# Patient Record
Sex: FEMALE | Race: ASIAN | Employment: OTHER | URBAN - METROPOLITAN AREA
[De-identification: names, ages, dates, MRNs, and addresses within clinical notes are randomized per-mention and may not be internally consistent; named-entity substitution may affect disease eponyms.]

---

## 2017-01-03 ENCOUNTER — TELEPHONE (OUTPATIENT)
Dept: INTERNAL MEDICINE CLINIC | Facility: CLINIC | Age: 72
End: 2017-01-03

## 2017-01-06 ENCOUNTER — TELEPHONE (OUTPATIENT)
Dept: INTERNAL MEDICINE CLINIC | Facility: CLINIC | Age: 72
End: 2017-01-06

## 2017-01-06 NOTE — TELEPHONE ENCOUNTER
Pt called in asking if the repeat BMP is really necessary stating that she drank a lot of water prior to having labs drawn and that is what could have caused her to have low sodium levels.  Pt was advised that she is correct that drinking a large amount of

## 2017-01-07 ENCOUNTER — OFFICE VISIT (OUTPATIENT)
Dept: INTERNAL MEDICINE CLINIC | Facility: CLINIC | Age: 72
End: 2017-01-07

## 2017-01-07 VITALS
WEIGHT: 116 LBS | TEMPERATURE: 99 F | DIASTOLIC BLOOD PRESSURE: 84 MMHG | BODY MASS INDEX: 22.19 KG/M2 | HEART RATE: 74 BPM | HEIGHT: 60.5 IN | RESPIRATION RATE: 14 BRPM | OXYGEN SATURATION: 98 % | SYSTOLIC BLOOD PRESSURE: 122 MMHG

## 2017-01-07 DIAGNOSIS — K51.019 ULCERATIVE (CHRONIC) ENTEROCOLITIS, UNSPECIFIED COMPLICATION (HCC): Primary | Chronic | ICD-10-CM

## 2017-01-07 PROCEDURE — 99213 OFFICE O/P EST LOW 20 MIN: CPT | Performed by: INTERNAL MEDICINE

## 2017-01-07 NOTE — PROGRESS NOTES
Marino Cantor [facility]  is a 80year old female. Patient presents with:  Loose Stools   recent had recent treatment for H pylori by Dr. Shari Denson.   A few weeks later is worth noticing some abdominal discomfort with occasional mushy stools  HPI:       C REVIEW OF SYSTEMS:   Gastrointestinal:   Reflux no. Pain in stomach no. Loss of control of bowels no. recent travel no. recent antibiotics yes  any family member sick no. Appetite change no. Black stools no. Bloating no. Blood in stool no.  Chad color

## 2017-01-11 ENCOUNTER — TELEPHONE (OUTPATIENT)
Dept: INTERNAL MEDICINE CLINIC | Facility: CLINIC | Age: 72
End: 2017-01-11

## 2017-01-11 NOTE — TELEPHONE ENCOUNTER
Pt was provide with test results and stated that she is concerned because she is still having diarrhea, leakage, difficulty digesting her food, abdominal pain after eating and yesterday large amount of bright red blood after a BM.  Pt stated that when she w

## 2017-01-13 LAB — RESULT:: NOT DETECTED

## 2017-06-05 ENCOUNTER — HOSPITAL ENCOUNTER (OUTPATIENT)
Dept: BONE DENSITY | Age: 72
Discharge: HOME | End: 2017-06-05
Attending: INTERNAL MEDICINE
Payer: MEDICARE

## 2017-06-05 DIAGNOSIS — M81.0 OSTEOPOROSIS, UNSPECIFIED OSTEOPOROSIS TYPE, UNSPECIFIED PATHOLOGICAL FRACTURE PRESENCE: ICD-10-CM

## 2017-06-05 DIAGNOSIS — M81.0 SENILE OSTEOPOROSIS: ICD-10-CM

## 2017-06-05 DIAGNOSIS — Z78.0 POST-MENOPAUSAL: ICD-10-CM

## 2017-06-05 DIAGNOSIS — K91.850 POUCHITIS (HCC): ICD-10-CM

## 2017-06-05 PROCEDURE — 77080 DXA BONE DENSITY AXIAL: CPT | Performed by: INTERNAL MEDICINE

## 2017-06-07 ENCOUNTER — OFFICE VISIT (OUTPATIENT)
Dept: INTERNAL MEDICINE CLINIC | Facility: CLINIC | Age: 72
End: 2017-06-07

## 2017-06-07 ENCOUNTER — HOSPITAL ENCOUNTER (OUTPATIENT)
Dept: GENERAL RADIOLOGY | Age: 72
Discharge: HOME | End: 2017-06-07
Attending: PHYSICIAN ASSISTANT
Payer: MEDICARE

## 2017-06-07 VITALS
SYSTOLIC BLOOD PRESSURE: 108 MMHG | TEMPERATURE: 98 F | RESPIRATION RATE: 19 BRPM | DIASTOLIC BLOOD PRESSURE: 74 MMHG | HEART RATE: 76 BPM | WEIGHT: 117 LBS | OXYGEN SATURATION: 97 % | BODY MASS INDEX: 22.38 KG/M2 | HEIGHT: 60.5 IN

## 2017-06-07 DIAGNOSIS — M25.472 PAIN AND SWELLING OF LEFT ANKLE: ICD-10-CM

## 2017-06-07 DIAGNOSIS — M25.572 PAIN AND SWELLING OF LEFT ANKLE: ICD-10-CM

## 2017-06-07 DIAGNOSIS — M25.572 PAIN AND SWELLING OF LEFT ANKLE: Primary | ICD-10-CM

## 2017-06-07 DIAGNOSIS — M25.472 PAIN AND SWELLING OF LEFT ANKLE: Primary | ICD-10-CM

## 2017-06-07 PROCEDURE — 99213 OFFICE O/P EST LOW 20 MIN: CPT | Performed by: PHYSICIAN ASSISTANT

## 2017-06-07 PROCEDURE — 73610 X-RAY EXAM OF ANKLE: CPT | Performed by: PHYSICIAN ASSISTANT

## 2017-06-07 RX ORDER — PREDNISONE 20 MG/1
20 TABLET ORAL DAILY
Qty: 5 TABLET | Refills: 0 | Status: SHIPPED | OUTPATIENT
Start: 2017-06-07 | End: 2017-06-07

## 2017-06-07 RX ORDER — ATORVASTATIN CALCIUM 40 MG/1
40 TABLET, FILM COATED ORAL
Refills: 6 | COMMUNITY
Start: 2017-04-11 | End: 2017-06-07 | Stop reason: ALTCHOICE

## 2017-06-07 RX ORDER — ATENOLOL 50 MG/1
25 TABLET ORAL DAILY
Refills: 1 | COMMUNITY
Start: 2017-03-25

## 2017-06-07 NOTE — PROGRESS NOTES
Santo Clinton is a 80year old female. HPI:   Patient presents for L foot pain which began yesterday. Denies injury but notes she was doing a lot of walking yesterday and wearing heels the day before. Pain began when getting up out of a chair to walk. is soft & nontender. NEUROVASCULAR: Bilateral LE strength 5/5. +PF/DF. DP & PT pulses present bilaterally. ASSESSMENT AND PLAN:   # L ankle pain & swelling: XR to eval for distal fib fx. Use tylenol prn (avoid NSAIDs d/t colitis), ice, elevation.

## 2017-06-07 NOTE — PATIENT INSTRUCTIONS
Ankle Pain & Swelling:  - start prednisone 20 mg - take 1 tablet daily x 5 days  - ice (10-15 minutes at a time, 2-3 times a day)  - elevate your leg above the level of your heart when at rest  - trace the alphabet with your big toe 1-2 times a day to keep

## 2017-06-08 ENCOUNTER — TELEPHONE (OUTPATIENT)
Dept: INTERNAL MEDICINE CLINIC | Facility: CLINIC | Age: 72
End: 2017-06-08

## 2017-06-08 DIAGNOSIS — M25.472 PAIN AND SWELLING OF ANKLE, LEFT: Primary | ICD-10-CM

## 2017-06-08 DIAGNOSIS — M25.572 PAIN AND SWELLING OF ANKLE, LEFT: Primary | ICD-10-CM

## 2017-06-08 PROBLEM — M84.364A: Status: ACTIVE | Noted: 2017-06-08

## 2017-06-08 NOTE — TELEPHONE ENCOUNTER
Good morning,                Pt called to request for this ref to be changed from Dr. Uzma Cannon to Dr. Benita Estrella, per pt has apt today at 2:pm. Please advise if this ref can be changed.  Pt would like a call back once ref has been changed, call pt at 6

## 2017-06-15 ENCOUNTER — TELEPHONE (OUTPATIENT)
Dept: INTERNAL MEDICINE CLINIC | Facility: CLINIC | Age: 72
End: 2017-06-15

## 2017-06-15 NOTE — TELEPHONE ENCOUNTER
Received a call from Iesha with Dr Butler  office stating that pts DEXA scan was abnormal. Please advise.

## 2017-06-15 NOTE — TELEPHONE ENCOUNTER
Future Appointments  Date Time Provider Taras Marleni   6/19/2017 3:15 PM Hao Huddleston MD EMG 8 EMG [facility]   2/34/5753 9:85 AM Billie Cook PA-C [facility]

## 2017-06-19 ENCOUNTER — OFFICE VISIT (OUTPATIENT)
Dept: INTERNAL MEDICINE CLINIC | Facility: CLINIC | Age: 72
End: 2017-06-19

## 2017-06-19 VITALS
WEIGHT: 115 LBS | TEMPERATURE: 98 F | RESPIRATION RATE: 16 BRPM | OXYGEN SATURATION: 98 % | BODY MASS INDEX: 20.63 KG/M2 | HEART RATE: 76 BPM | HEIGHT: 62.5 IN | DIASTOLIC BLOOD PRESSURE: 70 MMHG | SYSTOLIC BLOOD PRESSURE: 114 MMHG

## 2017-06-19 DIAGNOSIS — E78.00 PURE HYPERCHOLESTEROLEMIA: Chronic | ICD-10-CM

## 2017-06-19 DIAGNOSIS — R73.9 HYPERGLYCEMIA: ICD-10-CM

## 2017-06-19 DIAGNOSIS — M81.0 SENILE OSTEOPOROSIS: Primary | Chronic | ICD-10-CM

## 2017-06-19 PROCEDURE — 99213 OFFICE O/P EST LOW 20 MIN: CPT | Performed by: INTERNAL MEDICINE

## 2017-06-19 NOTE — PROGRESS NOTES
Torrey Euceda   is a 80year old female. Patient presents with: Follow - Up      HPI:   Patient here for DEXA scan results    Current Outpatient Prescriptions:  atenolol 50 MG Oral Tab Take 50 mg by mouth daily.    Disp:  Rfl: HERMES Mantilla 505 of the fact that she has had a significant drop in her bone strength she was advised to see an endocrinologist.  She can opinion whether parental form of treatment would be the optimum route especially since she has had a history of ulcerative colitis and

## 2017-06-21 ENCOUNTER — TELEPHONE (OUTPATIENT)
Dept: INTERNAL MEDICINE CLINIC | Facility: CLINIC | Age: 72
End: 2017-06-21

## 2017-06-21 DIAGNOSIS — M81.0 SENILE OSTEOPOROSIS: Primary | Chronic | ICD-10-CM

## 2017-06-21 NOTE — TELEPHONE ENCOUNTER
I tried to enter referral for this patient but was unable to find Chino Pal with [facility] in [facility]. I contacted Abel Licona and she stated that she is going to contact [facility] to inquire how we can enter referral and get back with us.      Heriberto Hernandez

## 2017-06-21 NOTE — TELEPHONE ENCOUNTER
Referral entered and approved through pts insurance company in [address] [place] [address]. Pt notified.

## 2017-07-02 LAB
CHOL/HDLC RATIO: 2 (CALC)
CHOLESTEROL, TOTAL: 108 MG/DL (ref 125–200)
HDL CHOLESTEROL: 53 MG/DL
HEMOGLOBIN A1C: 6.2 % OF TOTAL HGB
LDL-CHOLESTEROL: 35 MG/DL (CALC)
NON-HDL CHOLESTEROL: 55 MG/DL (CALC)
TRIGLYCERIDES: 98 MG/DL

## 2017-07-05 DIAGNOSIS — R73.9 HYPERGLYCEMIA: ICD-10-CM

## 2017-07-05 DIAGNOSIS — E78.00 PURE HYPERCHOLESTEROLEMIA: Primary | Chronic | ICD-10-CM

## 2017-08-10 ENCOUNTER — TELEPHONE (OUTPATIENT)
Dept: INTERNAL MEDICINE CLINIC | Facility: CLINIC | Age: 72
End: 2017-08-10

## 2017-08-10 NOTE — TELEPHONE ENCOUNTER
SYLWIA Montgomery,     If this could please be documented on the pts chart, that way the referral dept can see the you guys did receive our message, have looked into it but are waiting clinicals just in case the pt c

## 2017-10-16 ENCOUNTER — TELEPHONE (OUTPATIENT)
Dept: INTERNAL MEDICINE CLINIC | Facility: CLINIC | Age: 72
End: 2017-10-16

## 2017-10-16 NOTE — TELEPHONE ENCOUNTER
Wanted to know if there are any vaccines we can proved here in the office for her upcoming travels to [place]. Informed that she can contact travel clinic. Understanding verbalized.

## 2017-10-23 ENCOUNTER — TELEPHONE (OUTPATIENT)
Dept: INTERNAL MEDICINE CLINIC | Facility: CLINIC | Age: 72
End: 2017-10-23

## 2017-10-23 DIAGNOSIS — Z11.59 NEED FOR HEPATITIS B SCREENING TEST: Primary | ICD-10-CM

## 2017-10-23 NOTE — TELEPHONE ENCOUNTER
Future Appointments  Date Time Provider Taras Marleni   10/24/2017 4:30 PM EMG 08 NURSE EMG 8 EM[facility] [place]   12/23/2017 11:00 AM Laurie Rios MD EMG 8 EM[facility] [place]

## 2017-10-24 ENCOUNTER — NURSE ONLY (OUTPATIENT)
Dept: INTERNAL MEDICINE CLINIC | Facility: CLINIC | Age: 72
End: 2017-10-24

## 2017-10-24 DIAGNOSIS — Z02.1 PRE-EMPLOYMENT HEALTH SCREENING EXAMINATION: Primary | ICD-10-CM

## 2017-10-24 PROCEDURE — G0009 ADMIN PNEUMOCOCCAL VACCINE: HCPCS | Performed by: INTERNAL MEDICINE

## 2017-10-24 PROCEDURE — 90670 PCV13 VACCINE IM: CPT | Performed by: INTERNAL MEDICINE

## 2017-11-20 ENCOUNTER — TELEPHONE (OUTPATIENT)
Dept: INTERNAL MEDICINE CLINIC | Facility: CLINIC | Age: 72
End: 2017-11-20

## 2017-11-20 DIAGNOSIS — Z02.1 ENCOUNTER FOR PRE-EMPLOYMENT HEALTH SCREENING EXAMINATION: Primary | ICD-10-CM

## 2017-11-20 NOTE — TELEPHONE ENCOUNTER
Pt called and wanted Quantiferon TB test done at [address] [place] [address] rather then [facility]. New order placed for correct resulting agency.

## 2017-11-24 ENCOUNTER — TELEPHONE (OUTPATIENT)
Dept: INTERNAL MEDICINE CLINIC | Facility: CLINIC | Age: 72
End: 2017-11-24

## 2017-12-30 ENCOUNTER — OFFICE VISIT (OUTPATIENT)
Dept: INTERNAL MEDICINE CLINIC | Facility: CLINIC | Age: 72
End: 2017-12-30

## 2017-12-30 VITALS
HEIGHT: 62.5 IN | HEART RATE: 69 BPM | RESPIRATION RATE: 14 BRPM | OXYGEN SATURATION: 99 % | WEIGHT: 115 LBS | BODY MASS INDEX: 20.63 KG/M2 | DIASTOLIC BLOOD PRESSURE: 78 MMHG | SYSTOLIC BLOOD PRESSURE: 128 MMHG | TEMPERATURE: 98 F

## 2017-12-30 DIAGNOSIS — E78.00 PURE HYPERCHOLESTEROLEMIA: Chronic | ICD-10-CM

## 2017-12-30 DIAGNOSIS — Z12.39 SCREENING FOR BREAST CANCER: ICD-10-CM

## 2017-12-30 DIAGNOSIS — Z00.00 ROUTINE GENERAL MEDICAL EXAMINATION AT A HEALTH CARE FACILITY: Primary | ICD-10-CM

## 2017-12-30 DIAGNOSIS — Z95.5 STENTED CORONARY ARTERY: ICD-10-CM

## 2017-12-30 PROCEDURE — G0439 PPPS, SUBSEQ VISIT: HCPCS | Performed by: INTERNAL MEDICINE

## 2017-12-30 RX ORDER — ATORVASTATIN CALCIUM 40 MG/1
20 TABLET, FILM COATED ORAL NIGHTLY
Refills: 6 | COMMUNITY
Start: 2017-11-26

## 2017-12-30 NOTE — PROGRESS NOTES
Jadyn Small [facility]  is a 71year old female. Patient presents with:  CPX: [employer] wellness visit      HPI:   See below    Current Outpatient Prescriptions:  atorvastatin 40 MG Oral Tab Take 40 mg by mouth.  At Bedtime Disp:  Rfl: 6   atenolol 50 redness, no drainage. Ears no earaches, no fullness, normal hearing, no tinnitus. Nose and Sinuses no recurrent colds, no discharge, no itching, no hay fever, no nosebleeds, no sinus trouble. Mouth and Pharynx no sore throats, no hoarseness.  Neck no lumps, GENERAL:   Build: normal .   General Appearance: alert and oriented, pleasant. HEENT:   Ear canals: normal.   EOM: within normal limit-conjunctiva normal.   Fundi: normal.   Head: normocephalic. Nasal septum: midline. Nose: unremarkable.    Oral cav Shelby Casanova was seen today for cpx.     Diagnoses and all orders for this visit:    Routine general medical examination at a health care facility  -     ASSAY, THYROID STIM HORMONE  -     T4(THYROXINE TOTAL)  -     HEMOGLOBIN A1C  -     COMP METABOLIC PANEL (14

## 2018-01-03 ENCOUNTER — TELEPHONE (OUTPATIENT)
Dept: INTERNAL MEDICINE CLINIC | Facility: CLINIC | Age: 73
End: 2018-01-03

## 2018-01-03 NOTE — TELEPHONE ENCOUNTER
Monet with [employer] called in looking for additional dx codes for T4, TSH and A1C.      Provided with E78.5 and R73.09.

## 2018-01-07 LAB
ABSOLUTE BASOPHILS: 59 CELLS/UL (ref 0–200)
ABSOLUTE EOSINOPHILS: 227 CELLS/UL (ref 15–500)
ABSOLUTE LYMPHOCYTES: 2171 CELLS/UL (ref 850–3900)
ABSOLUTE MONOCYTES: 378 CELLS/UL (ref 200–950)
ABSOLUTE NEUTROPHILS: 2565 CELLS/UL (ref 1500–7800)
ALBUMIN/GLOBULIN RATIO: 1.2 (CALC) (ref 1–2.5)
ALBUMIN: 3.8 G/DL (ref 3.6–5.1)
ALKALINE PHOSPHATASE: 83 U/L (ref 33–130)
ALT: 15 U/L (ref 6–29)
APPEARANCE: CLEAR
AST: 20 U/L (ref 10–35)
BASOPHILS: 1.1 %
BILIRUBIN, TOTAL: 0.9 MG/DL (ref 0.2–1.2)
BILIRUBIN: NEGATIVE
BUN: 10 MG/DL (ref 7–25)
CALCIUM: 9.1 MG/DL (ref 8.6–10.4)
CARBON DIOXIDE: 26 MMOL/L (ref 20–31)
CHLORIDE: 102 MMOL/L (ref 98–110)
COLOR: YELLOW
CREATININE: 0.84 MG/DL (ref 0.6–0.93)
EGFR IF AFRICN AM: 81 ML/MIN/1.73M2
EGFR IF NONAFRICN AM: 70 ML/MIN/1.73M2
EOSINOPHILS: 4.2 %
GLOBULIN: 3.3 G/DL (CALC) (ref 1.9–3.7)
GLUCOSE: 140 MG/DL (ref 65–99)
GLUCOSE: NEGATIVE
HEMATOCRIT: 38.6 % (ref 35–45)
HEMOGLOBIN A1C: 6.3 % OF TOTAL HGB
HEMOGLOBIN: 12 G/DL (ref 11.7–15.5)
KETONES: NEGATIVE
LYMPHOCYTES: 40.2 %
MCH: 27.3 PG (ref 27–33)
MCHC: 31.1 G/DL (ref 32–36)
MCV: 87.7 FL (ref 80–100)
MONOCYTES: 7 %
MPV: 9.9 FL (ref 7.5–12.5)
NEUTROPHILS: 47.5 %
NITRITE: NEGATIVE
OCCULT BLOOD: NEGATIVE
PH: 6 (ref 5–8)
PLATELET COUNT: 325 THOUSAND/UL (ref 140–400)
POTASSIUM: 4.7 MMOL/L (ref 3.5–5.3)
PROTEIN, TOTAL: 7.1 G/DL (ref 6.1–8.1)
PROTEIN: NEGATIVE
RDW: 12.9 % (ref 11–15)
RED BLOOD CELL COUNT: 4.4 MILLION/UL (ref 3.8–5.1)
SODIUM: 135 MMOL/L (ref 135–146)
SPECIFIC GRAVITY: 1.01 (ref 1–1.03)
T4 (THYROXINE), TOTAL: 8.2 MCG/DL (ref 4.5–12)
TSH: 1.69 MIU/L (ref 0.4–4.5)
WHITE BLOOD CELL COUNT: 5.4 THOUSAND/UL (ref 3.8–10.8)

## 2018-01-18 ENCOUNTER — HOSPITAL ENCOUNTER (OUTPATIENT)
Dept: MAMMOGRAPHY | Age: 73
Discharge: HOME | End: 2018-01-18
Attending: INTERNAL MEDICINE
Payer: MEDICARE

## 2018-01-18 DIAGNOSIS — Z12.39 SCREENING FOR BREAST CANCER: ICD-10-CM

## 2018-01-18 PROCEDURE — 77067 SCR MAMMO BI INCL CAD: CPT | Performed by: INTERNAL MEDICINE

## 2018-01-22 ENCOUNTER — TELEPHONE (OUTPATIENT)
Dept: INTERNAL MEDICINE CLINIC | Facility: CLINIC | Age: 73
End: 2018-01-22

## 2018-01-22 NOTE — TELEPHONE ENCOUNTER
Patient states she has bumps on her breast and just had a mammogram last Thursday and would like to get results. Patient worried because she hasn't gotten her results yet.  Please call patient, wants to speak to nurse

## 2018-01-22 NOTE — TELEPHONE ENCOUNTER
Advised that mammogram is normal. Understanding verbalized. Pt c/o painful red blisters to her R breast x 5 days. Denies drainage, burning and does not understanding the term tingling.     Advised the blisters may be shingles and to come into the office

## 2018-02-02 ENCOUNTER — HOSPITAL ENCOUNTER (OUTPATIENT)
Dept: CV DIAGNOSTICS | Facility: HOSPITAL | Age: 73
Discharge: HOME | End: 2018-02-02
Attending: SPECIALIST
Payer: MEDICARE

## 2018-02-02 DIAGNOSIS — I10 HTN (HYPERTENSION): ICD-10-CM

## 2018-02-02 DIAGNOSIS — Z98.61 H/O CORONARY ANGIOPLASTY: ICD-10-CM

## 2018-02-02 DIAGNOSIS — E78.49 OTHER HYPERLIPIDEMIA: ICD-10-CM

## 2018-02-02 DIAGNOSIS — I25.10 CAD (CORONARY ARTERY DISEASE): ICD-10-CM

## 2018-02-02 PROCEDURE — 78452 HT MUSCLE IMAGE SPECT MULT: CPT | Performed by: SPECIALIST

## 2018-02-02 PROCEDURE — 93306 TTE W/DOPPLER COMPLETE: CPT | Performed by: SPECIALIST

## 2018-02-02 PROCEDURE — 93018 CV STRESS TEST I&R ONLY: CPT | Performed by: SPECIALIST

## 2018-02-02 PROCEDURE — 93017 CV STRESS TEST TRACING ONLY: CPT | Performed by: SPECIALIST

## 2018-03-22 ENCOUNTER — TELEPHONE (OUTPATIENT)
Dept: INTERNAL MEDICINE CLINIC | Facility: CLINIC | Age: 73
End: 2018-03-22

## 2018-03-22 NOTE — TELEPHONE ENCOUNTER
Wendi with [employer] (pts employer) is requesting titers on mumps, rubella, varicella and hx of TDAP vaccine to be faxed to them.      I informed Wendi that since we do not have a consent from pt, I am unable to fax or provide any information to her d/t HIP

## 2018-03-22 NOTE — TELEPHONE ENCOUNTER
Pts  notified of below information and informed that we do not have records of titers so pt will need to have blood drawn. Understanding verbalized.

## 2018-03-22 NOTE — TELEPHONE ENCOUNTER
Wendi, calling from "[employer] an  service. Requesting to seek results for pt's past immunization results for: Mumps and Rubella. Wendi stated she received some info, however result was vacant.      Person to Contact Wendi:  T: [phone]

## 2018-05-15 ENCOUNTER — TELEPHONE (OUTPATIENT)
Dept: INTERNAL MEDICINE CLINIC | Facility: CLINIC | Age: 73
End: 2018-05-15

## 2018-05-15 DIAGNOSIS — Z02.1 PRE-EMPLOYMENT HEALTH SCREENING EXAMINATION: Primary | ICD-10-CM

## 2018-05-15 NOTE — TELEPHONE ENCOUNTER
Pt needs orders for titers for Varicella to return to work. [facility] wants to  orders in office to take to with her for draw.   Call pt to advise when orders are ready for pickup

## 2018-06-08 ENCOUNTER — TELEPHONE (OUTPATIENT)
Dept: INTERNAL MEDICINE CLINIC | Facility: CLINIC | Age: 73
End: 2018-06-08

## 2018-06-08 NOTE — TELEPHONE ENCOUNTER
Patient would like to speak with nurse regarding certain vaccinations she has had. Patient states she updated her vaccines but I informed she had the zoster and patient continues to repeat there should be more.  Patient was also informed that  Order for [facility]

## 2018-06-11 NOTE — TELEPHONE ENCOUNTER
Pt called to inquire about titers and not vaccines. Informed pt titer for varicella in the system and pt verbalized will complete tomorrow.

## 2018-09-14 ENCOUNTER — TELEPHONE (OUTPATIENT)
Dept: INTERNAL MEDICINE CLINIC | Facility: CLINIC | Age: 73
End: 2018-09-14

## 2018-09-14 NOTE — TELEPHONE ENCOUNTER
Pt would like a call back states that she had fainted and hurt her ribs doctors at the hospital told her to F/U with PCP in 3 to 4 weeks but is in pain would like to know if its normal or dose she need to come in

## 2018-09-14 NOTE — TELEPHONE ENCOUNTER
Pt states that she had to be rushed to the ambulance d/t syncope episode on 8/23. Pt stated that they performed CPR on pt. Asked pt if her heart stopped and she stated \"that is what they detected but all tests were normal\".  Pt complaining of her ribs sti

## 2018-09-17 ENCOUNTER — TELEPHONE (OUTPATIENT)
Dept: INTERNAL MEDICINE CLINIC | Facility: CLINIC | Age: 73
End: 2018-09-17

## 2018-09-17 NOTE — TELEPHONE ENCOUNTER
Sent medical records request to [facility] requesting most recent hospital records. Waiting on records to be faxed over.

## 2018-10-09 ENCOUNTER — TELEPHONE (OUTPATIENT)
Dept: INTERNAL MEDICINE CLINIC | Facility: CLINIC | Age: 73
End: 2018-10-09

## 2018-10-09 DIAGNOSIS — R07.81 RIB PAIN: Primary | ICD-10-CM

## 2018-10-09 NOTE — TELEPHONE ENCOUNTER
Spoke with pt and she stated that pain is bilateral.  X-ray bilateral ordered.    Pt given contact information for central scheduling

## 2018-10-09 NOTE — TELEPHONE ENCOUNTER
Patient looking for a xray for her ribs, was at [address] for care and saw doctors there but would like to know if Dr. Tatum Tracey can order a xray to see how shes healing.  Please call pt

## 2018-10-09 NOTE — TELEPHONE ENCOUNTER
Pt was in hospital on 8/23/18 d/t syncope episode. Pt stated that EMS had to do CPR because she was not breaking and her ribs are very sore still (see previous TE).  Notified pt she would need to come in for hfu since provider has not seen her since she was

## 2018-10-13 ENCOUNTER — HOSPITAL ENCOUNTER (OUTPATIENT)
Dept: GENERAL RADIOLOGY | Age: 73
Discharge: HOME | End: 2018-10-13
Attending: INTERNAL MEDICINE
Payer: MEDICARE

## 2018-10-13 DIAGNOSIS — R07.81 RIB PAIN: ICD-10-CM

## 2018-10-13 PROCEDURE — 71111 X-RAY EXAM RIBS/CHEST4/> VWS: CPT | Performed by: INTERNAL MEDICINE

## 2018-12-29 ENCOUNTER — OFFICE VISIT (OUTPATIENT)
Dept: INTERNAL MEDICINE CLINIC | Facility: CLINIC | Age: 73
End: 2018-12-29
Payer: MEDICARE

## 2018-12-29 VITALS
OXYGEN SATURATION: 100 % | HEART RATE: 66 BPM | TEMPERATURE: 98 F | RESPIRATION RATE: 16 BRPM | DIASTOLIC BLOOD PRESSURE: 70 MMHG | SYSTOLIC BLOOD PRESSURE: 144 MMHG | BODY MASS INDEX: 21.37 KG/M2 | WEIGHT: 113.19 LBS | HEIGHT: 61 IN

## 2018-12-29 DIAGNOSIS — R05.9 COUGH: ICD-10-CM

## 2018-12-29 DIAGNOSIS — L29.9 ITCHING: ICD-10-CM

## 2018-12-29 DIAGNOSIS — M81.0 SENILE OSTEOPOROSIS: ICD-10-CM

## 2018-12-29 DIAGNOSIS — Z00.00 ROUTINE GENERAL MEDICAL EXAMINATION AT A HEALTH CARE FACILITY: Primary | ICD-10-CM

## 2018-12-29 DIAGNOSIS — Z12.31 ENCOUNTER FOR SCREENING MAMMOGRAM FOR MALIGNANT NEOPLASM OF BREAST: ICD-10-CM

## 2018-12-29 DIAGNOSIS — K51.019 CHRONIC ULCERATIVE ENTEROCOLITIS WITH COMPLICATION (HCC): Chronic | ICD-10-CM

## 2018-12-29 PROCEDURE — 99213 OFFICE O/P EST LOW 20 MIN: CPT | Performed by: INTERNAL MEDICINE

## 2018-12-29 PROCEDURE — G0439 PPPS, SUBSEQ VISIT: HCPCS | Performed by: INTERNAL MEDICINE

## 2018-12-29 RX ORDER — CHOLECALCIFEROL (VITAMIN D3) 50 MCG
2000 TABLET ORAL DAILY
Qty: 30 TABLET | Refills: 0 | Status: SHIPPED | OUTPATIENT
Start: 2018-12-29 | End: 2019-12-29

## 2018-12-29 RX ORDER — PREDNISONE 1 MG/1
TABLET ORAL
Qty: 26 TABLET | Refills: 0 | Status: SHIPPED | OUTPATIENT
Start: 2018-12-29 | End: 2019-12-03 | Stop reason: ALTCHOICE

## 2018-12-29 RX ORDER — ALENDRONATE SODIUM 70 MG/1
70 TABLET ORAL WEEKLY
Qty: 13 TABLET | Refills: 3 | Status: SHIPPED | OUTPATIENT
Start: 2018-12-29 | End: 2019-03-29

## 2018-12-29 NOTE — PATIENT INSTRUCTIONS
Patient did check with Dr. Cornell De La O about Lialda and itching  Patient to see Dr. Janet Varela for her cardiac follow-up.   Will need to review her records from [facility]

## 2018-12-29 NOTE — PROGRESS NOTES
Santo Clinton   is a 68year old female.     Patient presents with:  Wellness Visit: [address]; Nonfasting  Chest Congestion  Itching  Skin: C/o feeling a pulsation sensation in her left hand      HPI:   See below  also has had recent cough with some General/Constitutional:   General able to do usual activities, good exercise tolerance, good general state of health, no fatigue, no fever, no weakness, no weight loss or gain . HEENT/Neck:   Head no headache, no dizziness, no lightheadedness.  Eyes n none. Trouble with balance none. Psychiatric:   Patient denies depression, hallucinations, memory loss. Anxiety none. Insomnia none. EXAM:   /70 (BP Location: Left arm, Patient Position: Sitting, Cuff Size: adult)   Pulse 66   Temp 98. oriented x 3. Motor: power-normal/tone -normal/co-ordination normal/wasting -none/involuntary movements -none. Reflexes: normal.   Sensory: normal sensation to all modalities. LYMPHATICS:   Groin: no adenopathy . Inguinal: no adenopathy.    Jeremy Render agrees to the plan. The patient is asked to Return in about 1 week (around 1/5/2019) for result discussion.   Leesa Ladd MD

## 2019-01-02 ENCOUNTER — TELEPHONE (OUTPATIENT)
Dept: INTERNAL MEDICINE CLINIC | Facility: CLINIC | Age: 74
End: 2019-01-02

## 2019-01-02 NOTE — TELEPHONE ENCOUNTER
Pt called complaining we coded her and her husbands labs incorrectly. Pt has not had her labs drawn yet. Pt instructed that the  can look at this next week and look at the coding.  Pt states she has notified our office and they will not p

## 2019-01-07 NOTE — TELEPHONE ENCOUNTER
Patient called to follow up on the issue.  Notified that  will overlook the issue once back in the office

## 2019-01-08 ENCOUNTER — TELEPHONE (OUTPATIENT)
Dept: INTERNAL MEDICINE CLINIC | Facility: CLINIC | Age: 74
End: 2019-01-08

## 2019-01-08 ENCOUNTER — LAB ENCOUNTER (OUTPATIENT)
Dept: LAB | Age: 74
End: 2019-01-08
Attending: INTERNAL MEDICINE
Payer: MEDICARE

## 2019-01-08 DIAGNOSIS — L29.9 ITCHING: ICD-10-CM

## 2019-01-08 LAB
ALBUMIN SERPL-MCNC: 3.3 G/DL (ref 3.1–4.5)
ALBUMIN/GLOB SERPL: 0.8 {RATIO} (ref 1–2)
ALP LIVER SERPL-CCNC: 89 U/L (ref 55–142)
ALT SERPL-CCNC: 22 U/L (ref 14–54)
ANION GAP SERPL CALC-SCNC: 6 MMOL/L (ref 0–18)
AST SERPL-CCNC: 26 U/L (ref 15–41)
BASOPHILS # BLD AUTO: 0.05 X10(3) UL (ref 0–0.1)
BASOPHILS NFR BLD AUTO: 0.8 %
BILIRUB SERPL-MCNC: 1.1 MG/DL (ref 0.1–2)
BILIRUB UR QL STRIP.AUTO: NEGATIVE
BUN BLD-MCNC: 12 MG/DL (ref 8–20)
BUN/CREAT SERPL: 11.3 (ref 10–20)
CALCIUM BLD-MCNC: 9.2 MG/DL (ref 8.3–10.3)
CHLORIDE SERPL-SCNC: 105 MMOL/L (ref 101–111)
CHOLEST SMN-MCNC: 106 MG/DL (ref ?–200)
CO2 SERPL-SCNC: 28 MMOL/L (ref 22–32)
COLOR UR AUTO: YELLOW
CREAT BLD-MCNC: 1.06 MG/DL (ref 0.55–1.02)
EOSINOPHIL # BLD AUTO: 0.28 X10(3) UL (ref 0–0.3)
EOSINOPHIL NFR BLD AUTO: 4.5 %
ERYTHROCYTE [DISTWIDTH] IN BLOOD BY AUTOMATED COUNT: 13.2 % (ref 11.5–16)
EST. AVERAGE GLUCOSE BLD GHB EST-MCNC: 169 MG/DL (ref 68–126)
GLOBULIN PLAS-MCNC: 4.4 G/DL (ref 2.8–4.4)
GLUCOSE BLD-MCNC: 147 MG/DL (ref 70–99)
GLUCOSE UR STRIP.AUTO-MCNC: NEGATIVE MG/DL
HBA1C MFR BLD HPLC: 7.5 % (ref ?–5.7)
HCT VFR BLD AUTO: 36.6 % (ref 34–50)
HDLC SERPL-MCNC: 56 MG/DL (ref 40–59)
HGB BLD-MCNC: 10.9 G/DL (ref 12–16)
HYALINE CASTS #/AREA URNS AUTO: PRESENT /LPF
IMMATURE GRANULOCYTE COUNT: 0.02 X10(3) UL (ref 0–1)
IMMATURE GRANULOCYTE RATIO %: 0.3 %
IMMUNOGLOBULIN E: >1150 IU/ML (ref 3.6–114)
KETONES UR STRIP.AUTO-MCNC: NEGATIVE MG/DL
LDLC SERPL CALC-MCNC: 30 MG/DL (ref ?–100)
LYMPHOCYTES # BLD AUTO: 1.92 X10(3) UL (ref 0.9–4)
LYMPHOCYTES NFR BLD AUTO: 30.8 %
M PROTEIN MFR SERPL ELPH: 7.7 G/DL (ref 6.4–8.2)
MCH RBC QN AUTO: 24.9 PG (ref 27–33.2)
MCHC RBC AUTO-ENTMCNC: 29.8 G/DL (ref 31–37)
MCV RBC AUTO: 83.6 FL (ref 81–100)
MONOCYTES # BLD AUTO: 0.44 X10(3) UL (ref 0.1–1)
MONOCYTES NFR BLD AUTO: 7.1 %
NEUTROPHIL ABS PRELIM: 3.52 X10 (3) UL (ref 1.3–6.7)
NEUTROPHILS # BLD AUTO: 3.52 X10(3) UL (ref 1.3–6.7)
NEUTROPHILS NFR BLD AUTO: 56.5 %
NITRITE UR QL STRIP.AUTO: NEGATIVE
NONHDLC SERPL-MCNC: 50 MG/DL (ref ?–130)
OSMOLALITY SERPL CALC.SUM OF ELEC: 290 MOSM/KG (ref 275–295)
PH UR STRIP.AUTO: 6 [PH] (ref 4.5–8)
PLATELET # BLD AUTO: 413 10(3)UL (ref 150–450)
POTASSIUM SERPL-SCNC: 4.5 MMOL/L (ref 3.6–5.1)
PROT UR STRIP.AUTO-MCNC: NEGATIVE MG/DL
RBC # BLD AUTO: 4.38 X10(6)UL (ref 3.8–5.1)
RBC UR QL AUTO: NEGATIVE
RED CELL DISTRIBUTION WIDTH-SD: 40.3 FL (ref 35.1–46.3)
SODIUM SERPL-SCNC: 139 MMOL/L (ref 136–144)
SP GR UR STRIP.AUTO: 1.01 (ref 1–1.03)
T4 SERPL-MCNC: 10.7 UG/DL (ref 4.5–10.9)
TRIGL SERPL-MCNC: 98 MG/DL (ref 30–149)
TSI SER-ACNC: 1.23 MIU/ML (ref 0.35–5.5)
UROBILINOGEN UR STRIP.AUTO-MCNC: <2 MG/DL
VIT D+METAB SERPL-MCNC: 40.9 NG/ML (ref 30–100)
VLDLC SERPL CALC-MCNC: 20 MG/DL (ref 0–30)
WBC # BLD AUTO: 6.2 X10(3) UL (ref 4–13)

## 2019-01-08 PROCEDURE — 80053 COMPREHEN METABOLIC PANEL: CPT | Performed by: INTERNAL MEDICINE

## 2019-01-08 PROCEDURE — 85025 COMPLETE CBC W/AUTO DIFF WBC: CPT | Performed by: INTERNAL MEDICINE

## 2019-01-08 PROCEDURE — 82306 VITAMIN D 25 HYDROXY: CPT | Performed by: INTERNAL MEDICINE

## 2019-01-08 PROCEDURE — 84443 ASSAY THYROID STIM HORMONE: CPT | Performed by: INTERNAL MEDICINE

## 2019-01-08 PROCEDURE — 82785 ASSAY OF IGE: CPT

## 2019-01-08 PROCEDURE — 84436 ASSAY OF TOTAL THYROXINE: CPT | Performed by: INTERNAL MEDICINE

## 2019-01-08 PROCEDURE — 81001 URINALYSIS AUTO W/SCOPE: CPT | Performed by: INTERNAL MEDICINE

## 2019-01-08 PROCEDURE — 83036 HEMOGLOBIN GLYCOSYLATED A1C: CPT | Performed by: INTERNAL MEDICINE

## 2019-01-08 PROCEDURE — 36415 COLL VENOUS BLD VENIPUNCTURE: CPT | Performed by: INTERNAL MEDICINE

## 2019-01-08 PROCEDURE — 80061 LIPID PANEL: CPT | Performed by: INTERNAL MEDICINE

## 2019-01-08 NOTE — TELEPHONE ENCOUNTER
Patient had labs done today [employer] and is asking about original orders sent to [address] [place] [address] 56 at Lab they would have reviewed at check in regarding her insurance/lab location.

## 2019-01-11 ENCOUNTER — TELEPHONE (OUTPATIENT)
Dept: INTERNAL MEDICINE CLINIC | Facility: CLINIC | Age: 74
End: 2019-01-11

## 2019-01-11 NOTE — TELEPHONE ENCOUNTER
Pt requested call back for test results. Pt also wanted know if necessary for chest xray now that she is feeling better and if she can get a flu on 1/19/19 when she comes to for her mammogram? I advised that no nurse appts were avail on 1/19/19.  Please dis

## 2019-01-11 NOTE — TELEPHONE ENCOUNTER
Advised pt that per our discussion earlier provider would discuss results at f/u visit. Pt verbalized understanding and scheduled OV.    Future Appointments   Date Time Provider Taras Marleni          1/26/2019 10:45 AM Julia Castro MD EMG 8 EMG [place]

## 2019-02-02 ENCOUNTER — OFFICE VISIT (OUTPATIENT)
Dept: INTERNAL MEDICINE CLINIC | Facility: CLINIC | Age: 74
End: 2019-02-02
Payer: MEDICARE

## 2019-02-02 ENCOUNTER — HOSPITAL ENCOUNTER (OUTPATIENT)
Dept: MAMMOGRAPHY | Age: 74
Discharge: HOME | End: 2019-02-02
Attending: INTERNAL MEDICINE
Payer: MEDICARE

## 2019-02-02 VITALS
BODY MASS INDEX: 18.16 KG/M2 | HEIGHT: 66 IN | RESPIRATION RATE: 14 BRPM | SYSTOLIC BLOOD PRESSURE: 116 MMHG | HEART RATE: 74 BPM | OXYGEN SATURATION: 94 % | TEMPERATURE: 98 F | WEIGHT: 113 LBS | DIASTOLIC BLOOD PRESSURE: 82 MMHG

## 2019-02-02 DIAGNOSIS — Z00.00 ROUTINE GENERAL MEDICAL EXAMINATION AT A HEALTH CARE FACILITY: ICD-10-CM

## 2019-02-02 DIAGNOSIS — E11.9 TYPE 2 DIABETES MELLITUS WITHOUT COMPLICATION, WITHOUT LONG-TERM CURRENT USE OF INSULIN (HCC): ICD-10-CM

## 2019-02-02 DIAGNOSIS — R82.90 ABNORMAL URINALYSIS: Primary | ICD-10-CM

## 2019-02-02 DIAGNOSIS — Z12.31 ENCOUNTER FOR SCREENING MAMMOGRAM FOR MALIGNANT NEOPLASM OF BREAST: ICD-10-CM

## 2019-02-02 PROCEDURE — 77063 BREAST TOMOSYNTHESIS BI: CPT | Performed by: INTERNAL MEDICINE

## 2019-02-02 PROCEDURE — 77067 SCR MAMMO BI INCL CAD: CPT | Performed by: INTERNAL MEDICINE

## 2019-02-02 PROCEDURE — 90732 PPSV23 VACC 2 YRS+ SUBQ/IM: CPT | Performed by: INTERNAL MEDICINE

## 2019-02-02 PROCEDURE — 99213 OFFICE O/P EST LOW 20 MIN: CPT | Performed by: INTERNAL MEDICINE

## 2019-02-02 PROCEDURE — G0009 ADMIN PNEUMOCOCCAL VACCINE: HCPCS | Performed by: INTERNAL MEDICINE

## 2019-02-02 NOTE — PROGRESS NOTES
Marino Cantor [facility]  is a 68year old female.     Patient presents with:  Lab Results: Review      HPI:   DIABETES MELLITUS:   The diet that the patient has been following is none  The frequency of the monitoring schedule is none    The results of th [facility])    • Ulcerative colitis ([address]     Dr. Rodney Suarez      Social History:  Social History    Tobacco Use      Smoking status: Never Smoker      Smokeless tobacco: Never Used    Alcohol use: No      Alcohol/week: 0.0 oz    Drug use: No       REVIEW OF SYSTEMS: herbal therapy      Spacing of meals -varying exercises discussed with patient   Reasoning of checking sugars pre and 2 hours  PP discussed with pt     Diabetic foot care information and brochure given to the patient  .   Blood in 4 months       The patient

## 2019-02-02 NOTE — PATIENT INSTRUCTIONS
Refuses to start any medicines at this moment.   Also declines to see the nutritionist.  Patient wants to try herbal therapy      Spacing of meals -varying exercises discussed with patient   Reasoning of checking sugars pre and 2 hours  PP discussed with pt

## 2019-02-04 ENCOUNTER — TELEPHONE (OUTPATIENT)
Dept: INTERNAL MEDICINE CLINIC | Facility: CLINIC | Age: 74
End: 2019-02-04

## 2019-02-04 DIAGNOSIS — R82.90 ABNORMAL URINALYSIS: Primary | ICD-10-CM

## 2019-02-06 NOTE — TELEPHONE ENCOUNTER
Giselle Castro ([phone]) from [address] [place] [address] called to advise that spec was never scanned by . She stated that  always scans in spec, so she believes sample was not sent to [address] [place] [address]. Please call if you have additional questions.

## 2019-02-06 NOTE — TELEPHONE ENCOUNTER
Called  And asked if she had dropped off her urine sample to [facility].  Pt states she has not. Will drop it off on 2/11 Monday and will call us to let us know she did. Called [facility] to let them know urine was not lost and Pt. Will drop it off next Monday.

## 2019-02-13 LAB
APPEARANCE: CLEAR
BILIRUBIN: NEGATIVE
COLOR: YELLOW
GLUCOSE: NEGATIVE
KETONES: NEGATIVE
NITRITE: NEGATIVE
OCCULT BLOOD: NEGATIVE
PH: 5.5 (ref 5–8)
PROTEIN: NEGATIVE
SPECIFIC GRAVITY: 1.01 (ref 1–1.03)

## 2019-02-22 ENCOUNTER — TELEPHONE (OUTPATIENT)
Dept: INTERNAL MEDICINE CLINIC | Facility: CLINIC | Age: 74
End: 2019-02-22

## 2019-02-25 NOTE — TELEPHONE ENCOUNTER
Pt notified of urine results. Pt stated she thought she was supposed to have blood work done. Per provider LOV note pt to have blood work in 4 months. Notified pt and she verbalized understanding. Pt also asking when she can have dexa scan done.  Advised

## 2019-04-04 NOTE — TELEPHONE ENCOUNTER
Urine sample that was sent to [facility] is missing. Pt will go to [facility] and drop off urine sample. patient and [facility] team

## 2019-07-09 ENCOUNTER — TELEPHONE (OUTPATIENT)
Dept: INTERNAL MEDICINE CLINIC | Facility: CLINIC | Age: 74
End: 2019-07-09

## 2019-07-09 DIAGNOSIS — Z78.0 POST-MENOPAUSAL: Primary | ICD-10-CM

## 2019-07-09 DIAGNOSIS — M81.0 SENILE OSTEOPOROSIS: ICD-10-CM

## 2019-07-09 NOTE — TELEPHONE ENCOUNTER
Pt requesting a bone density lab and also if she can have her labs repeated that she took for her CPX earlier this year

## 2019-07-09 NOTE — TELEPHONE ENCOUNTER
Called pt to clarify which labs needs repeated. Pt requested a CBC as pt stated blood hemoglobin count always low. Pt also requested a A1C, which is in the system already along w a microalb, pt informed.      Pt also informed has a hx of Colitis, and inq

## 2019-07-12 ENCOUNTER — APPOINTMENT (OUTPATIENT)
Dept: LAB | Age: 74
End: 2019-07-12
Attending: INTERNAL MEDICINE
Payer: MEDICARE

## 2019-07-12 ENCOUNTER — HOSPITAL ENCOUNTER (OUTPATIENT)
Dept: BONE DENSITY | Age: 74
Discharge: HOME | End: 2019-07-12
Attending: INTERNAL MEDICINE
Payer: MEDICARE

## 2019-07-12 DIAGNOSIS — Z78.0 POST-MENOPAUSAL: ICD-10-CM

## 2019-07-12 DIAGNOSIS — M81.0 SENILE OSTEOPOROSIS: Chronic | ICD-10-CM

## 2019-07-12 LAB
BASOPHILS # BLD AUTO: 0.07 X10(3) UL (ref 0–0.2)
BASOPHILS NFR BLD AUTO: 1.2 %
CREAT UR-SCNC: 98.4 MG/DL
DEPRECATED RDW RBC AUTO: 45 FL (ref 35.1–46.3)
EOSINOPHIL # BLD AUTO: 0.31 X10(3) UL (ref 0–0.7)
EOSINOPHIL NFR BLD AUTO: 5.4 %
ERYTHROCYTE [DISTWIDTH] IN BLOOD BY AUTOMATED COUNT: 13.6 % (ref 11–15)
EST. AVERAGE GLUCOSE BLD GHB EST-MCNC: 157 MG/DL (ref 68–126)
HBA1C MFR BLD HPLC: 7.1 % (ref ?–5.7)
HCT VFR BLD AUTO: 44.1 % (ref 35–48)
HGB BLD-MCNC: 13.4 G/DL (ref 12–16)
IMM GRANULOCYTES # BLD AUTO: 0.02 X10(3) UL (ref 0–1)
IMM GRANULOCYTES NFR BLD: 0.3 %
LYMPHOCYTES # BLD AUTO: 1.91 X10(3) UL (ref 1–4)
LYMPHOCYTES NFR BLD AUTO: 33.1 %
MCH RBC QN AUTO: 27.2 PG (ref 26–34)
MCHC RBC AUTO-ENTMCNC: 30.4 G/DL (ref 31–37)
MCV RBC AUTO: 89.6 FL (ref 80–100)
MICROALBUMIN UR-MCNC: 7.19 MG/DL
MICROALBUMIN/CREAT 24H UR-RTO: 73.1 UG/MG (ref ?–30)
MONOCYTES # BLD AUTO: 0.32 X10(3) UL (ref 0.1–1)
MONOCYTES NFR BLD AUTO: 5.5 %
NEUTROPHILS # BLD AUTO: 3.14 X10 (3) UL (ref 1.5–7.7)
NEUTROPHILS # BLD AUTO: 3.14 X10(3) UL (ref 1.5–7.7)
NEUTROPHILS NFR BLD AUTO: 54.5 %
PLATELET # BLD AUTO: 230 10(3)UL (ref 150–450)
RBC # BLD AUTO: 4.92 X10(6)UL (ref 3.8–5.3)
WBC # BLD AUTO: 5.8 X10(3) UL (ref 4–11)

## 2019-07-12 PROCEDURE — 83036 HEMOGLOBIN GLYCOSYLATED A1C: CPT | Performed by: INTERNAL MEDICINE

## 2019-07-12 PROCEDURE — 77080 DXA BONE DENSITY AXIAL: CPT | Performed by: INTERNAL MEDICINE

## 2019-07-12 PROCEDURE — 82570 ASSAY OF URINE CREATININE: CPT | Performed by: INTERNAL MEDICINE

## 2019-07-12 PROCEDURE — 82043 UR ALBUMIN QUANTITATIVE: CPT | Performed by: INTERNAL MEDICINE

## 2019-07-12 PROCEDURE — 36415 COLL VENOUS BLD VENIPUNCTURE: CPT | Performed by: INTERNAL MEDICINE

## 2019-07-12 PROCEDURE — 85025 COMPLETE CBC W/AUTO DIFF WBC: CPT | Performed by: INTERNAL MEDICINE

## 2019-07-19 ENCOUNTER — TELEPHONE (OUTPATIENT)
Dept: INTERNAL MEDICINE CLINIC | Facility: CLINIC | Age: 74
End: 2019-07-19

## 2019-07-19 NOTE — TELEPHONE ENCOUNTER
Spoke with Dr. Esthela Simmons as he was leaving office. He stated pt would need to come in to be seen before any medication is started. OV scheduled.   Future Appointments   Date Time Provider Taras Yepez   7/23/2019 10:30 AM DUONG Pandey EMG 8 E

## 2019-07-19 NOTE — TELEPHONE ENCOUNTER
Pt would like a call back from nurse states that Dr Rafael Suárez suggested he would let her know if she needed to be on BP medication on her next visit she states that she wants to get it before pcp go's on vacation next week

## 2019-07-19 NOTE — TELEPHONE ENCOUNTER
Pt is requesting medication for diabetes to be sent to pharmacy before provider leaves [facility]. Pt does have upcoming follow up but she is wanting to go ahead and start medication. Pt is insistent on having medication sent in.  Please advise

## 2019-07-23 NOTE — TELEPHONE ENCOUNTER
Patient cancelled today's appointment via phytel. Called and advised patient to reschedule, but patient declined.  States that she does not feel comfortable seeing another provider and she would rather wait for Dr. Archie Mahmood recommendation  Patient then pro

## 2019-07-23 NOTE — TELEPHONE ENCOUNTER
Advised pt that there are several different types of medications that treat DM and that is something that will need to be discussed at an office visit. Pt verbalized understanding.  Offered to reschedule appointment with Dr. Kiah Ponce but pt declined at this

## 2019-07-30 ENCOUNTER — TELEPHONE (OUTPATIENT)
Dept: INTERNAL MEDICINE CLINIC | Facility: CLINIC | Age: 74
End: 2019-07-30

## 2019-07-30 PROBLEM — R63.4 WEIGHT LOSS: Status: ACTIVE | Noted: 2019-07-30

## 2019-07-30 PROBLEM — K91.850 POUCHITIS (HCC): Status: ACTIVE | Noted: 2019-07-30

## 2019-07-30 NOTE — TELEPHONE ENCOUNTER
Per our office policy we do not have enough of the doses to start the series yet. Pt stated that per Dr. Tye Montes she needed to have shingrix vaccine prior to starting Entivyio. Nothing noted in Dr. Tye Montes note stating pt needed shingrix.  She stated to f/u with

## 2019-10-28 ENCOUNTER — TELEPHONE (OUTPATIENT)
Dept: INTERNAL MEDICINE CLINIC | Facility: CLINIC | Age: 74
End: 2019-10-28

## 2019-10-28 DIAGNOSIS — Z11.1 SCREENING-PULMONARY TB: ICD-10-CM

## 2019-10-28 DIAGNOSIS — Z01.84 IMMUNITY STATUS TESTING: Primary | ICD-10-CM

## 2019-10-28 NOTE — TELEPHONE ENCOUNTER
Patient called in requesting a two-step TB skin test and Chest x-ray per her job. Informed patient that there is already a Chest x-ray in system from 12/29/18.     Patient is requesting a call back once TB order is approved by Dr. Lane Ortiz so she can schedul

## 2019-10-29 NOTE — TELEPHONE ENCOUNTER
Discussed with pt as typically chest x-ray only needed if tb test positive. Pt stated her form has both on there. Pt scheduled chest x-ray using order in the system already.  Asked pt if she needed 2 step tb testing or just one tb skin test. Pt stated she t

## 2019-10-29 NOTE — TELEPHONE ENCOUNTER
Pt notified quantiferon TB testing ordered. Pt verbalized understanding.      Pt stating she needs a chest x-ray ordered as well d/t interpretative services at a hospital. Discussed with pt typically chest x-ray is not needed unless have a positive TB skin/

## 2019-11-02 ENCOUNTER — HOSPITAL ENCOUNTER (OUTPATIENT)
Dept: GENERAL RADIOLOGY | Age: 74
Discharge: HOME | End: 2019-11-02
Attending: INTERNAL MEDICINE
Payer: MEDICARE

## 2019-11-02 ENCOUNTER — TELEPHONE (OUTPATIENT)
Dept: INTERNAL MEDICINE CLINIC | Facility: CLINIC | Age: 74
End: 2019-11-02

## 2019-11-02 DIAGNOSIS — Z11.1 SCREENING-PULMONARY TB: ICD-10-CM

## 2019-11-02 PROCEDURE — 71046 X-RAY EXAM CHEST 2 VIEWS: CPT | Performed by: INTERNAL MEDICINE

## 2019-11-02 NOTE — TELEPHONE ENCOUNTER
Patient called stating that she had her flu shot about 6 weeks ago on L deltoid and still feels soreness. Patient has difficulty raising her arm. Requesting nurse recommendation. Please advise.

## 2019-11-04 NOTE — TELEPHONE ENCOUNTER
Pt c/o soreness in left arm. Pt is unsure if d/t flu vaccine or if she pulled something. Pt denies numbness or tingling. Pt denies trying any warm/cold compress or any OTC medications.  Offered to schedule OV however pt wanting to wait and try warm/cold com

## 2019-11-04 NOTE — TELEPHONE ENCOUNTER
Patient returned call from RN. Patient's phone disconnected before able to transfer. Please call back.

## 2019-11-04 NOTE — TELEPHONE ENCOUNTER
Attempted to call pt however phone rang several times and then went to busy tone. Also see test results.

## 2019-12-03 ENCOUNTER — OFFICE VISIT (OUTPATIENT)
Dept: INTERNAL MEDICINE CLINIC | Facility: CLINIC | Age: 74
End: 2019-12-03
Payer: MEDICARE

## 2019-12-03 VITALS
TEMPERATURE: 98 F | SYSTOLIC BLOOD PRESSURE: 122 MMHG | WEIGHT: 112 LBS | DIASTOLIC BLOOD PRESSURE: 62 MMHG | RESPIRATION RATE: 14 BRPM | BODY MASS INDEX: 20.87 KG/M2 | OXYGEN SATURATION: 99 % | HEART RATE: 98 BPM | HEIGHT: 61.25 IN

## 2019-12-03 DIAGNOSIS — Z95.5 STENTED CORONARY ARTERY: ICD-10-CM

## 2019-12-03 DIAGNOSIS — Z00.00 ROUTINE GENERAL MEDICAL EXAMINATION AT A HEALTH CARE FACILITY: Primary | ICD-10-CM

## 2019-12-03 DIAGNOSIS — E11.9 TYPE 2 DIABETES MELLITUS WITHOUT COMPLICATION, WITHOUT LONG-TERM CURRENT USE OF INSULIN (HCC): ICD-10-CM

## 2019-12-03 DIAGNOSIS — E78.00 PURE HYPERCHOLESTEROLEMIA: ICD-10-CM

## 2019-12-03 DIAGNOSIS — M81.0 SENILE OSTEOPOROSIS: ICD-10-CM

## 2019-12-03 DIAGNOSIS — Z79.899 OTHER LONG TERM (CURRENT) DRUG THERAPY: ICD-10-CM

## 2019-12-03 PROBLEM — R63.4 WEIGHT LOSS: Status: RESOLVED | Noted: 2019-07-30 | Resolved: 2019-12-03

## 2019-12-03 PROCEDURE — G0439 PPPS, SUBSEQ VISIT: HCPCS | Performed by: INTERNAL MEDICINE

## 2019-12-03 NOTE — PATIENT INSTRUCTIONS
See me After blood tests  10 Minutes spent listening to patient ranting about her insurance issues and nonpayment of bills

## 2019-12-03 NOTE — PROGRESS NOTES
Daniella Anne [facility]  is a 69year old female. Patient presents with:  Physical      HPI:   See below    Current Outpatient Medications   Medication Sig Dispense Refill   • metFORMIN HCl 500 MG Oral Tab Take 500 mg by mouth 2 (two) times daily.   5 HEENT/Neck:   Head no headache, no dizziness, no lightheadedness. Eyes normal vision, no redness, no drainage. Ears no earaches, no fullness, normal hearing, no tinnitus.  Nose and Sinuses no recurrent colds, no discharge, no itching, no hay fever, no nos (36.5 °C) (Oral)   Resp 14   Ht 61.25\"   Wt 112 lb (50.8 kg)   SpO2 99%   BMI 20.99 kg/m²   GENERAL:   Build: normal .   General Appearance: alert and oriented, pleasant.    HEENT:   Ear canals: normal.   EOM: within normal limit-conjunctiva normal.   Fund Supraclavicular: none.    DERMATOLOGY:   Rash: no.            ASSESSMENT AND PLAN:   Marlene Martinez was seen today for physical.    Diagnoses and all orders for this visit:    Routine general medical examination at a health care facility  [address]

## 2020-07-27 ENCOUNTER — TELEPHONE (OUTPATIENT)
Dept: INTERNAL MEDICINE CLINIC | Facility: CLINIC | Age: 75
End: 2020-07-27

## 2020-07-27 DIAGNOSIS — E11.9 TYPE 2 DIABETES MELLITUS WITHOUT COMPLICATION, WITHOUT LONG-TERM CURRENT USE OF INSULIN (HCC): Primary | Chronic | ICD-10-CM

## 2020-07-27 DIAGNOSIS — D64.9 LOW HEMOGLOBIN: ICD-10-CM

## 2020-07-27 NOTE — TELEPHONE ENCOUNTER
Requests orders for A1C recheck     RTC 6 weeks from 12/3/19 - declined appt at this time only wants orders for labs.   States will follow up in office at later time    Call patient to advise

## 2020-07-28 NOTE — TELEPHONE ENCOUNTER
Pt notified lab order placed and verbalized understanding. Pt also asking for a hemoglobin lab to be checked as she has a hx of lower hgb. Order pending. Pt aware she will only receive a call back if there are any issues.

## 2020-07-29 LAB
ABSOLUTE BASOPHILS: 50 CELLS/UL (ref 0–200)
ABSOLUTE EOSINOPHILS: 345 CELLS/UL (ref 15–500)
ABSOLUTE LYMPHOCYTES: 1685 CELLS/UL (ref 850–3900)
ABSOLUTE MONOCYTES: 355 CELLS/UL (ref 200–950)
ABSOLUTE NEUTROPHILS: 2565 CELLS/UL (ref 1500–7800)
BASOPHILS: 1 %
EOSINOPHILS: 6.9 %
HEMATOCRIT: 37.4 % (ref 35–45)
HEMOGLOBIN: 11.8 G/DL (ref 11.7–15.5)
LYMPHOCYTES: 33.7 %
MCH: 27.1 PG (ref 27–33)
MCHC: 31.6 G/DL (ref 32–36)
MCV: 86 FL (ref 80–100)
MONOCYTES: 7.1 %
MPV: 10.1 FL (ref 7.5–12.5)
NEUTROPHILS: 51.3 %
PLATELET COUNT: 299 THOUSAND/UL (ref 140–400)
RDW: 13.1 % (ref 11–15)
RED BLOOD CELL COUNT: 4.35 MILLION/UL (ref 3.8–5.1)
WHITE BLOOD CELL COUNT: 5 THOUSAND/UL (ref 3.8–10.8)

## 2020-07-30 LAB — HEMOGLOBIN A1C: 6.3 % OF TOTAL HGB

## 2020-09-22 ENCOUNTER — TELEPHONE (OUTPATIENT)
Dept: INTERNAL MEDICINE CLINIC | Facility: CLINIC | Age: 75
End: 2020-09-22

## 2020-09-22 NOTE — TELEPHONE ENCOUNTER
Patient coming in for Flu shot 9/30 and is requesting pneumonia shot as well.  Please verify patient is due as well as request order for this vaccine

## 2020-09-22 NOTE — TELEPHONE ENCOUNTER
Pt had prevnar 13 on 10/5/16 and 10/24/2017. Pt received pneumovax 23 on 2/2/2019. Please confirm pt not due for pneumovax 23 until 2/2/2024?

## 2020-09-30 ENCOUNTER — IMMUNIZATION (OUTPATIENT)
Dept: INTERNAL MEDICINE CLINIC | Facility: CLINIC | Age: 75
End: 2020-09-30
Payer: MEDICARE

## 2020-09-30 DIAGNOSIS — Z23 NEED FOR VACCINATION: ICD-10-CM

## 2020-09-30 PROCEDURE — 90662 IIV NO PRSV INCREASED AG IM: CPT | Performed by: INTERNAL MEDICINE

## 2020-09-30 PROCEDURE — G0008 ADMIN INFLUENZA VIRUS VAC: HCPCS | Performed by: INTERNAL MEDICINE

## 2020-10-29 ENCOUNTER — TELEPHONE (OUTPATIENT)
Dept: INTERNAL MEDICINE CLINIC | Facility: CLINIC | Age: 75
End: 2020-10-29

## 2020-10-29 DIAGNOSIS — Z11.1 SCREENING-PULMONARY TB: Primary | ICD-10-CM

## 2020-10-29 NOTE — TELEPHONE ENCOUNTER
Pt states that she is in need of quantiferon tb for employer ASAP. Orders pending if appropriate. TY!

## 2020-10-29 NOTE — TELEPHONE ENCOUNTER
Pt stated her Job informed her just yesterday that she needs to do a tb test and needs to give them the proof by no later then 11/01/20, pt can not work if she doesn't do the tb test, pt is a  and also a volunteer at [employer]

## 2020-11-03 ENCOUNTER — LAB ENCOUNTER (OUTPATIENT)
Dept: LAB | Age: 75
End: 2020-11-03
Attending: PHYSICIAN ASSISTANT
Payer: MEDICARE

## 2020-11-03 DIAGNOSIS — Z11.1 SCREENING EXAMINATION FOR PULMONARY TUBERCULOSIS: Primary | ICD-10-CM

## 2020-11-03 PROCEDURE — 86480 TB TEST CELL IMMUN MEASURE: CPT

## 2020-11-03 PROCEDURE — 36415 COLL VENOUS BLD VENIPUNCTURE: CPT

## 2020-11-03 NOTE — TELEPHONE ENCOUNTER
Patient would like to schedule TB test ASAP in the area today can we add to Nurse Visit schedule?  Please let her know either way

## 2020-11-06 NOTE — TELEPHONE ENCOUNTER
Patient called stating that she needs results by Monday for her employer. Notified patient that results are still in process. Advised her to follow up on Monday to see if results have been finalized.

## 2020-12-12 ENCOUNTER — OFFICE VISIT (OUTPATIENT)
Dept: INTERNAL MEDICINE CLINIC | Facility: CLINIC | Age: 75
End: 2020-12-12
Payer: MEDICARE

## 2020-12-12 ENCOUNTER — HOSPITAL ENCOUNTER (OUTPATIENT)
Dept: GENERAL RADIOLOGY | Age: 75
Discharge: HOME | End: 2020-12-12
Attending: INTERNAL MEDICINE
Payer: MEDICARE

## 2020-12-12 VITALS
DIASTOLIC BLOOD PRESSURE: 72 MMHG | BODY MASS INDEX: 19.79 KG/M2 | TEMPERATURE: 98 F | OXYGEN SATURATION: 99 % | RESPIRATION RATE: 16 BRPM | SYSTOLIC BLOOD PRESSURE: 150 MMHG | HEIGHT: 61 IN | HEART RATE: 72 BPM | WEIGHT: 104.81 LBS

## 2020-12-12 DIAGNOSIS — R63.4 WEIGHT LOSS: ICD-10-CM

## 2020-12-12 DIAGNOSIS — K51.019 CHRONIC ULCERATIVE ENTEROCOLITIS WITH COMPLICATION (HCC): Chronic | ICD-10-CM

## 2020-12-12 DIAGNOSIS — E11.9 TYPE 2 DIABETES MELLITUS WITHOUT COMPLICATION, WITHOUT LONG-TERM CURRENT USE OF INSULIN (HCC): Chronic | ICD-10-CM

## 2020-12-12 DIAGNOSIS — E78.00 PURE HYPERCHOLESTEROLEMIA: Chronic | ICD-10-CM

## 2020-12-12 DIAGNOSIS — Z12.31 ENCOUNTER FOR SCREENING MAMMOGRAM FOR MALIGNANT NEOPLASM OF BREAST: ICD-10-CM

## 2020-12-12 DIAGNOSIS — I25.10 ATHEROSCLEROSIS OF NATIVE CORONARY ARTERY OF NATIVE HEART, ANGINA PRESENCE UNSPECIFIED: Chronic | ICD-10-CM

## 2020-12-12 DIAGNOSIS — Z00.00 ROUTINE PHYSICAL EXAMINATION: Primary | ICD-10-CM

## 2020-12-12 DIAGNOSIS — L29.9 PRURITUS: ICD-10-CM

## 2020-12-12 PROCEDURE — G0439 PPPS, SUBSEQ VISIT: HCPCS | Performed by: INTERNAL MEDICINE

## 2020-12-12 PROCEDURE — 99213 OFFICE O/P EST LOW 20 MIN: CPT | Performed by: INTERNAL MEDICINE

## 2020-12-12 PROCEDURE — 71046 X-RAY EXAM CHEST 2 VIEWS: CPT | Performed by: INTERNAL MEDICINE

## 2020-12-12 PROCEDURE — 93000 ELECTROCARDIOGRAM COMPLETE: CPT | Performed by: INTERNAL MEDICINE

## 2020-12-12 RX ORDER — CHLORAL HYDRATE 500 MG
1000 CAPSULE ORAL DAILY
COMMUNITY

## 2020-12-12 RX ORDER — FERROUS SULFATE 325(65) MG
65 TABLET ORAL
COMMUNITY
End: 2021-03-04 | Stop reason: CLARIF

## 2020-12-12 NOTE — PROGRESS NOTES
Hannah Mejia [facility]  is a 77year old female.     Patient presents with:  Wellness Visit: AWV       HPI:   See below    Current Outpatient Medications   Medication Sig Dispense Refill   • Ferrous Sulfate 325 (65 Fe) MG Oral Tab Take 65 mg by mouth da exercise tolerance, good general state of health, no fatigue, no fever, no weakness, no weight loss or gain . HEENT/Neck:   Head no headache, no dizziness, no lightheadedness. Eyes normal vision, no redness, no drainage.  Ears no earaches, no fullness, no Tingling/numbness none. Trouble with balance none. Psychiatric:   Patient denies depression, hallucinations, memory loss. Anxiety none. Insomnia none.               EXAM:   /72 (BP Location: Left arm, Patient Position: Sitting, Cuff Size: adult)   P Gait: normal.   Mental Status: Alert & oriented x 3. Motor: power-normal/tone -normal/co-ordination normal/wasting -none/involuntary movements -none. Reflexes: normal.   Sensory: normal sensation to all modalities.    LYMPHATICS:   Groin: no adenopath

## 2020-12-12 NOTE — PATIENT INSTRUCTIONS
Blood pressure check and weight loss recommendations next visit after preliminary labs are available

## 2020-12-26 ENCOUNTER — APPOINTMENT (OUTPATIENT)
Dept: GENERAL RADIOLOGY | Age: 75
End: 2020-12-26
Attending: EMERGENCY MEDICINE
Payer: MEDICARE

## 2020-12-26 ENCOUNTER — HOSPITAL ENCOUNTER (OUTPATIENT)
Age: 75
Discharge: HOME | End: 2020-12-26
Attending: EMERGENCY MEDICINE
Payer: MEDICARE

## 2020-12-26 VITALS
SYSTOLIC BLOOD PRESSURE: 138 MMHG | TEMPERATURE: 98 F | RESPIRATION RATE: 18 BRPM | HEART RATE: 88 BPM | DIASTOLIC BLOOD PRESSURE: 50 MMHG | OXYGEN SATURATION: 100 %

## 2020-12-26 DIAGNOSIS — S50.01XA CONTUSION OF RIGHT ELBOW, INITIAL ENCOUNTER: Primary | ICD-10-CM

## 2020-12-26 DIAGNOSIS — S70.01XA CONTUSION OF RIGHT HIP, INITIAL ENCOUNTER: ICD-10-CM

## 2020-12-26 PROCEDURE — 99214 OFFICE O/P EST MOD 30 MIN: CPT

## 2020-12-26 PROCEDURE — 99203 OFFICE O/P NEW LOW 30 MIN: CPT

## 2020-12-26 PROCEDURE — 73503 X-RAY EXAM HIP UNI 4/> VIEWS: CPT | Performed by: EMERGENCY MEDICINE

## 2020-12-26 PROCEDURE — 73080 X-RAY EXAM OF ELBOW: CPT | Performed by: EMERGENCY MEDICINE

## 2020-12-26 NOTE — ED PROVIDER NOTES
Patient Seen in: [facility]      History   Patient presents with:  Fall    Stated Complaint: fell x 2 days hurt right side hip and elbow    HPI    23-year-old female comes to the hospital complaint of having pain in the area of her right hip and elbow  Other systems are as noted in HPI. Constitutional and vital signs reviewed. All other systems reviewed and negative except as noted above.     Physical Exam     ED Triage Vitals [12/26/20 1110]   /50   Pulse 88   Resp 18   Temp 97 12/12/2020 at 2:40 PM       Xr Elbow, Complete (min 3 Views), Right (cpt=73080)    Result Date: 12/26/2020  PROCEDURE:  XR ELBOW, COMPLETE (MIN 3 VIEWS), RIGHT (CPT=73080)  TECHNIQUE:  Three views were obtained. COMPARISON:  None.   INDICATIONS:  fell x 2 outpatient management for her elbow contusion and hip contusion and follow-up.   She is given orthopedics referral as well                         Disposition and Plan     Clinical Impression:  Contusion of right elbow, initial encounter  (primary encounter

## 2020-12-26 NOTE — ED INITIAL ASSESSMENT (HPI)
Patient states had a fall 2 days ago- injured her right elbow and hip  Denies any head injury or blood thinners

## 2020-12-28 ENCOUNTER — TELEPHONE (OUTPATIENT)
Dept: INTERNAL MEDICINE CLINIC | Facility: CLINIC | Age: 75
End: 2020-12-28

## 2020-12-28 DIAGNOSIS — M25.529 ELBOW PAIN, UNSPECIFIED LATERALITY: ICD-10-CM

## 2020-12-28 DIAGNOSIS — M25.559 HIP PAIN: Primary | ICD-10-CM

## 2020-12-28 NOTE — TELEPHONE ENCOUNTER
I spoke with pt whom states that she is still having pain to R hip and R elbow d/t fall. I spoke with  regarding pts s/s, along with xray results and IC notes. Per VO from , pt is to f/u with ortho.      Referral entered for ortho and pt notifie

## 2020-12-28 NOTE — TELEPHONE ENCOUNTER
Patient calling complaining of hip pain and can not walk. States she is in wheelchair now because of this. Went to immediate care on 12/26/2020, wants to let us know she is in pain . Did get pain medication through immediate care.       Wants nurse to

## 2021-01-06 ENCOUNTER — PATIENT MESSAGE (OUTPATIENT)
Dept: ORTHOPEDICS CLINIC | Facility: CLINIC | Age: 76
End: 2021-01-06

## 2021-01-06 ENCOUNTER — OFFICE VISIT (OUTPATIENT)
Dept: ORTHOPEDICS CLINIC | Facility: CLINIC | Age: 76
End: 2021-01-06
Payer: MEDICARE

## 2021-01-06 ENCOUNTER — TELEPHONE (OUTPATIENT)
Dept: ORTHOPEDICS CLINIC | Facility: CLINIC | Age: 76
End: 2021-01-06

## 2021-01-06 ENCOUNTER — HOSPITAL ENCOUNTER (OUTPATIENT)
Dept: GENERAL RADIOLOGY | Age: 76
Discharge: HOME | End: 2021-01-06
Attending: ORTHOPAEDIC SURGERY
Payer: MEDICARE

## 2021-01-06 VITALS — HEIGHT: 62 IN | WEIGHT: 108.81 LBS | BODY MASS INDEX: 20.03 KG/M2 | OXYGEN SATURATION: 99 % | HEART RATE: 70 BPM

## 2021-01-06 DIAGNOSIS — M25.551 RIGHT HIP PAIN: ICD-10-CM

## 2021-01-06 DIAGNOSIS — M25.551 RIGHT HIP PAIN: Primary | ICD-10-CM

## 2021-01-06 PROCEDURE — 73502 X-RAY EXAM HIP UNI 2-3 VIEWS: CPT | Performed by: ORTHOPAEDIC SURGERY

## 2021-01-06 PROCEDURE — 99203 OFFICE O/P NEW LOW 30 MIN: CPT | Performed by: ORTHOPAEDIC SURGERY

## 2021-01-06 RX ORDER — TRAMADOL HYDROCHLORIDE 50 MG/1
50 TABLET ORAL EVERY 8 HOURS PRN
Qty: 30 TABLET | Refills: 0 | Status: SHIPPED | OUTPATIENT
Start: 2021-01-06 | End: 2021-03-04 | Stop reason: CLARIF

## 2021-01-06 NOTE — H&P
[facility] New Patient Note    CC: Patient presents with:  Hip Pain: right hip pain       HPI: This 77year old female presents today with complaints of right hip pain.   Patient states that she had a fall 2 weeks ago on December 24 when someone bumpe Take 65 mg by mouth daily with breakfast.     • Omega-3 1000 MG Oral Cap Take 1,000 mg by mouth daily. • psyllium 28 % Oral Powd Pack Take 1 packet by mouth 2 (two) times daily.      • metFORMIN HCl 500 MG Oral Tab Take 500 mg by mouth 2 (two) times tripp cause pain. There is some discomfort with logroll of the hip. Left hip range of motion is 40 degrees external rotation, 15 to 20 degrees internal.  There is no pain. • Positive pain with [place].   • Neuromuscular: 5 out of 5 hip flexion strength, se Surgery  Phone [phone]  Fax [phone]

## 2021-01-06 NOTE — TELEPHONE ENCOUNTER
Patient stated she was told an RX for pain was going to be sent right over to [address] [facility], [place] today after her visit with Dr. Gay Gray, but there is no RX there. Can this be called in? Please call her @ [phone] when done so she can pick it up.

## 2021-01-07 NOTE — TELEPHONE ENCOUNTER
Called patient to inform her RX was sent to pharmacy she stated she was going to  today. She is also asking if she can get a walker through her insurance . I advised her to check with her insurance company and give us a call requesting a walker .

## 2021-01-07 NOTE — TELEPHONE ENCOUNTER
From: Ashleeis Paci  To: Nithin Burns MD  Sent: 1/6/2021 5:04 PM CST  Subject: Prescription Question    What is the name of the pain medicine prescribed by Dr. Corbin Mcardle, and whether it’s been ordered ? My pharmacy of record is [facility] in [facility].

## 2021-01-11 ENCOUNTER — TELEPHONE (OUTPATIENT)
Dept: ORTHOPEDICS CLINIC | Facility: CLINIC | Age: 76
End: 2021-01-11

## 2021-01-11 ENCOUNTER — TELEPHONE (OUTPATIENT)
Dept: INTERNAL MEDICINE CLINIC | Facility: CLINIC | Age: 76
End: 2021-01-11

## 2021-01-11 NOTE — TELEPHONE ENCOUNTER
Patient is calling wanting to know if Dr. Jolyn Osler can issue a letter for her insurance explaining her injury and what she can and cannot do.

## 2021-01-11 NOTE — TELEPHONE ENCOUNTER
Patient went to [facility] for fall that happened in December 2020. Dr. Lior Corral advised to follow up with Ortho per TE 12/28/2020    Patient has seen ortho and already called their office today to request letter.     Josiah Landin  Patient Customer Service Request

## 2021-01-12 NOTE — TELEPHONE ENCOUNTER
called patient to inform her that letter has been created and she can pick it up . she stated she needs more time. She also stated that it is painful for her to step down and walk she would like to know if she needs a cortisone injection.       Please advis

## 2021-01-13 NOTE — TELEPHONE ENCOUNTER
We need to see what the MRI shows - I cannot recommend any further treatment options until we are sure that we are not dealing with a fracture.   We can extend work restrictions beyond Friday if needed based on the MRI results which we will have Friday

## 2021-01-14 NOTE — TELEPHONE ENCOUNTER
Called patient and made her aware , she stated that she's having groin pain . She stated if a letter can be put in when we get the results. becuse of work on Monday.

## 2021-01-15 ENCOUNTER — HOSPITAL ENCOUNTER (OUTPATIENT)
Dept: MRI IMAGING | Age: 76
Discharge: HOME | End: 2021-01-15
Attending: ORTHOPAEDIC SURGERY
Payer: MEDICARE

## 2021-01-15 DIAGNOSIS — M25.551 RIGHT HIP PAIN: ICD-10-CM

## 2021-01-15 PROCEDURE — 73721 MRI JNT OF LWR EXTRE W/O DYE: CPT | Performed by: ORTHOPAEDIC SURGERY

## 2021-01-18 ENCOUNTER — TELEPHONE (OUTPATIENT)
Dept: ORTHOPEDICS CLINIC | Facility: CLINIC | Age: 76
End: 2021-01-18

## 2021-01-18 DIAGNOSIS — S32.810A MULTIPLE CLOSED FRACTURES OF PELVIS WITH STABLE DISRUPTION OF PELVIC RING, INITIAL ENCOUNTER (HCC): Primary | ICD-10-CM

## 2021-01-18 DIAGNOSIS — M25.551 RIGHT HIP PAIN: Primary | ICD-10-CM

## 2021-01-18 NOTE — TELEPHONE ENCOUNTER
Spoke with patient about results of hip/pelvis MRI - appears consistent with LC-1 right pelvis fracture, nondisplaced. Advised protected WBAT, pain control (has tramadol script). Patient asking for off work letter - needs to ambulate for work.  Also asking

## 2021-01-18 NOTE — TELEPHONE ENCOUNTER
Called patient to inform her that DME order was faxed to the pharmacy and  Confirmation was received

## 2021-01-18 NOTE — TELEPHONE ENCOUNTER
Patient is wondering about getting an RX for DME, a \"rollator. \"  Can this be faxed to the [facility] [address] [place] [address]. Can you please call patient when done so she goes to pick it up.

## 2021-02-17 ENCOUNTER — HOSPITAL ENCOUNTER (OUTPATIENT)
Dept: GENERAL RADIOLOGY | Age: 76
Discharge: HOME | End: 2021-02-17
Attending: ORTHOPAEDIC SURGERY
Payer: MEDICARE

## 2021-02-17 ENCOUNTER — OFFICE VISIT (OUTPATIENT)
Dept: ORTHOPEDICS CLINIC | Facility: CLINIC | Age: 76
End: 2021-02-17
Payer: MEDICARE

## 2021-02-17 VITALS — OXYGEN SATURATION: 98 % | HEART RATE: 74 BPM

## 2021-02-17 DIAGNOSIS — S32.9XXD CLOSED NONDISPLACED FRACTURE OF PELVIS WITH ROUTINE HEALING, UNSPECIFIED PART OF PELVIS, SUBSEQUENT ENCOUNTER: Primary | ICD-10-CM

## 2021-02-17 DIAGNOSIS — M25.551 RIGHT HIP PAIN: ICD-10-CM

## 2021-02-17 PROCEDURE — 99213 OFFICE O/P EST LOW 20 MIN: CPT | Performed by: ORTHOPAEDIC SURGERY

## 2021-02-17 PROCEDURE — 72190 X-RAY EXAM OF PELVIS: CPT | Performed by: ORTHOPAEDIC SURGERY

## 2021-02-17 NOTE — PROGRESS NOTES
[facility] Progress Note    Subjective: Patient returns 8 weeks following a fall onto her right side, sustained lateral compression type I pelvis fracture on the right.   She reports that she is getting better, but she is still requiring a crutch for

## 2021-03-01 ENCOUNTER — TELEPHONE (OUTPATIENT)
Dept: INTERNAL MEDICINE CLINIC | Facility: CLINIC | Age: 76
End: 2021-03-01

## 2021-03-01 NOTE — TELEPHONE ENCOUNTER
Pt was instructed by dermatologist dr. Mode Yadav to call her pcp to discuss pt's hemoglobin levels, pt is concerned about the hemoglobin level being so low and stated also dr. Mode Yadav is concerned, pt is requesting to talk to the nurse asap or dr. David Pathak.

## 2021-03-02 ENCOUNTER — OFFICE VISIT (OUTPATIENT)
Dept: INTERNAL MEDICINE CLINIC | Facility: CLINIC | Age: 76
End: 2021-03-02
Payer: MEDICARE

## 2021-03-02 VITALS
OXYGEN SATURATION: 98 % | HEART RATE: 70 BPM | RESPIRATION RATE: 16 BRPM | HEIGHT: 62 IN | TEMPERATURE: 97 F | SYSTOLIC BLOOD PRESSURE: 124 MMHG | DIASTOLIC BLOOD PRESSURE: 68 MMHG | BODY MASS INDEX: 19.32 KG/M2 | WEIGHT: 105 LBS

## 2021-03-02 DIAGNOSIS — D50.0 IRON DEFICIENCY ANEMIA DUE TO CHRONIC BLOOD LOSS: Primary | ICD-10-CM

## 2021-03-02 DIAGNOSIS — K91.850 POUCHITIS (HCC): ICD-10-CM

## 2021-03-02 DIAGNOSIS — K51.019 CHRONIC ULCERATIVE ENTEROCOLITIS WITH COMPLICATION (HCC): ICD-10-CM

## 2021-03-02 PROCEDURE — 99213 OFFICE O/P EST LOW 20 MIN: CPT | Performed by: INTERNAL MEDICINE

## 2021-03-02 NOTE — PROGRESS NOTES
Marino Cantor [facility]  is a 77year old female. Patient presents with:  Blood In Stool      HPI:   Patient was at the dermatologist office had a CBC drawn hemoglobin was 7.8.   Patient is here for follow-up does confess to having episodic bloody [facility])    • [facility]     Dr. Laura John   • Weight loss       Social History:  Social History    Tobacco Use      Smoking status: Never Smoker      Smokeless tobacco: Never Used    Alcohol use: No      Alcohol/week: 0.0 standard drinks    Drug use AND TIBC  -     B12 AND FOLATE  -     CBC WITH DIFFERENTIAL WITH PLATELET    Pouchitis ([facility]    Chronic ulcerative enterocolitis with complication [facility])         Patient Instructions   Pt informed to get the blood work drawn repeat CBC first thing tomorrow

## 2021-03-02 NOTE — TELEPHONE ENCOUNTER
Pt stated no one called her back yesterday and is requesting to please ask dr. Jonatan Cast to let her know what she should do about her hemoglobin pt is concerned.

## 2021-03-02 NOTE — TELEPHONE ENCOUNTER
Pt states that she was seen by derm and has labs drawn.     hgb was at a 7.8, pt and derm are both concerned. Derm feels that her pruritis may be d/t low hgb.      Pt states that she has had increased fatigue/weakness, dizziness, liquid stool with blood, we

## 2021-03-02 NOTE — PATIENT INSTRUCTIONS
Pt informed to get the blood work drawn repeat CBC first thing tomorrow morning she should establish a visit with Dr.Katwal PARNELL go to the ER if there is further worsening of symptoms.

## 2021-03-03 ENCOUNTER — LAB ENCOUNTER (OUTPATIENT)
Dept: LAB | Age: 76
End: 2021-03-03
Attending: INTERNAL MEDICINE
Payer: MEDICARE

## 2021-03-03 LAB
BASOPHILS # BLD AUTO: 0.1 X10(3) UL (ref 0–0.2)
BASOPHILS NFR BLD AUTO: 1.4 %
DEPRECATED HBV CORE AB SER IA-ACNC: 7.3 NG/ML
DEPRECATED RDW RBC AUTO: 40.2 FL (ref 35.1–46.3)
EOSINOPHIL # BLD AUTO: 0.53 X10(3) UL (ref 0–0.7)
EOSINOPHIL NFR BLD AUTO: 7.6 %
ERYTHROCYTE [DISTWIDTH] IN BLOOD BY AUTOMATED COUNT: 14.9 % (ref 11–15)
FOLATE SERPL-MCNC: 42.7 NG/ML (ref 8.7–?)
HCT VFR BLD AUTO: 26.7 %
HGB BLD-MCNC: 7.4 G/DL
IMM GRANULOCYTES # BLD AUTO: 0.02 X10(3) UL (ref 0–1)
IMM GRANULOCYTES NFR BLD: 0.3 %
IRON SATURATION: 15 %
IRON SERPL-MCNC: 82 UG/DL
LYMPHOCYTES # BLD AUTO: 1.97 X10(3) UL (ref 1–4)
LYMPHOCYTES NFR BLD AUTO: 28.2 %
MCH RBC QN AUTO: 20.4 PG (ref 26–34)
MCHC RBC AUTO-ENTMCNC: 27.7 G/DL (ref 31–37)
MCV RBC AUTO: 73.8 FL
MONOCYTES # BLD AUTO: 0.47 X10(3) UL (ref 0.1–1)
MONOCYTES NFR BLD AUTO: 6.7 %
NEUTROPHILS # BLD AUTO: 3.9 X10 (3) UL (ref 1.5–7.7)
NEUTROPHILS # BLD AUTO: 3.9 X10(3) UL (ref 1.5–7.7)
NEUTROPHILS NFR BLD AUTO: 55.8 %
PLATELET # BLD AUTO: 390 10(3)UL (ref 150–450)
RBC # BLD AUTO: 3.62 X10(6)UL
TOTAL IRON BINDING CAPACITY: 559 UG/DL (ref 240–450)
TRANSFERRIN SERPL-MCNC: 375 MG/DL (ref 200–360)
VIT B12 SERPL-MCNC: 427 PG/ML (ref 193–986)
WBC # BLD AUTO: 7 X10(3) UL (ref 4–11)

## 2021-03-03 PROCEDURE — 36415 COLL VENOUS BLD VENIPUNCTURE: CPT | Performed by: INTERNAL MEDICINE

## 2021-03-03 PROCEDURE — 82728 ASSAY OF FERRITIN: CPT | Performed by: INTERNAL MEDICINE

## 2021-03-03 PROCEDURE — 83540 ASSAY OF IRON: CPT | Performed by: INTERNAL MEDICINE

## 2021-03-03 PROCEDURE — 85025 COMPLETE CBC W/AUTO DIFF WBC: CPT | Performed by: INTERNAL MEDICINE

## 2021-03-03 PROCEDURE — 82746 ASSAY OF FOLIC ACID SERUM: CPT | Performed by: INTERNAL MEDICINE

## 2021-03-03 PROCEDURE — 83550 IRON BINDING TEST: CPT | Performed by: INTERNAL MEDICINE

## 2021-03-03 PROCEDURE — 82607 VITAMIN B-12: CPT | Performed by: INTERNAL MEDICINE

## 2021-03-04 ENCOUNTER — TELEPHONE (OUTPATIENT)
Dept: INTERNAL MEDICINE CLINIC | Facility: CLINIC | Age: 76
End: 2021-03-04

## 2021-03-04 ENCOUNTER — HOSPITAL ENCOUNTER (OUTPATIENT)
Facility: HOSPITAL | Age: 76
Setting detail: OBSERVATION
LOS: 1 days | Discharge: HOME | End: 2021-03-05
Attending: EMERGENCY MEDICINE | Admitting: HOSPITALIST
Payer: MEDICARE

## 2021-03-04 ENCOUNTER — APPOINTMENT (OUTPATIENT)
Dept: GENERAL RADIOLOGY | Facility: HOSPITAL | Age: 76
End: 2021-03-04
Attending: EMERGENCY MEDICINE
Payer: MEDICARE

## 2021-03-04 DIAGNOSIS — Z23 NEED FOR VACCINATION: ICD-10-CM

## 2021-03-04 DIAGNOSIS — R52 PAIN: ICD-10-CM

## 2021-03-04 DIAGNOSIS — D64.9 SYMPTOMATIC ANEMIA: Primary | ICD-10-CM

## 2021-03-04 DIAGNOSIS — K91.850 POUCHITIS (HCC): ICD-10-CM

## 2021-03-04 LAB
ALBUMIN SERPL-MCNC: 2.9 G/DL (ref 3.4–5)
ALBUMIN/GLOB SERPL: 0.7 {RATIO} (ref 1–2)
ALP LIVER SERPL-CCNC: 91 U/L
ALT SERPL-CCNC: 19 U/L
ANION GAP SERPL CALC-SCNC: 4 MMOL/L (ref 0–18)
ANTIBODY SCREEN: NEGATIVE
APTT PPP: 28.1 SECONDS (ref 25.4–36.1)
AST SERPL-CCNC: 20 U/L (ref 15–37)
BASOPHILS # BLD AUTO: 0.07 X10(3) UL (ref 0–0.2)
BASOPHILS NFR BLD AUTO: 1.2 %
BILIRUB SERPL-MCNC: 0.5 MG/DL (ref 0.1–2)
BUN BLD-MCNC: 19 MG/DL (ref 7–18)
BUN/CREAT SERPL: 22.4 (ref 10–20)
CALCIUM BLD-MCNC: 9.2 MG/DL (ref 8.5–10.1)
CHLORIDE SERPL-SCNC: 108 MMOL/L (ref 98–112)
CO2 SERPL-SCNC: 25 MMOL/L (ref 21–32)
CREAT BLD-MCNC: 0.85 MG/DL
CRP SERPL-MCNC: <0.29 MG/DL (ref ?–0.3)
DEPRECATED HBV CORE AB SER IA-ACNC: 8.8 NG/ML
DEPRECATED RDW RBC AUTO: 39.6 FL (ref 35.1–46.3)
EOSINOPHIL # BLD AUTO: 0.5 X10(3) UL (ref 0–0.7)
EOSINOPHIL NFR BLD AUTO: 8.8 %
ERYTHROCYTE [DISTWIDTH] IN BLOOD BY AUTOMATED COUNT: 15.2 % (ref 11–15)
GLOBULIN PLAS-MCNC: 4.3 G/DL (ref 2.8–4.4)
GLUCOSE BLD-MCNC: 103 MG/DL (ref 70–99)
GLUCOSE BLD-MCNC: 113 MG/DL (ref 70–99)
HCT VFR BLD AUTO: 25.5 %
HGB BLD-MCNC: 7.2 G/DL
HGB RETIC QN AUTO: 18 PG (ref 28.2–36.6)
IMM GRANULOCYTES # BLD AUTO: 0.01 X10(3) UL (ref 0–1)
IMM GRANULOCYTES NFR BLD: 0.2 %
IMM RETICS NFR: 0.22 RATIO (ref 0.1–0.3)
INR BLD: 1 (ref 0.89–1.11)
IRON SATURATION: 3 %
IRON SERPL-MCNC: 18 UG/DL
LYMPHOCYTES # BLD AUTO: 1.57 X10(3) UL (ref 1–4)
LYMPHOCYTES NFR BLD AUTO: 27.5 %
M PROTEIN MFR SERPL ELPH: 7.2 G/DL (ref 6.4–8.2)
MCH RBC QN AUTO: 20.5 PG (ref 26–34)
MCHC RBC AUTO-ENTMCNC: 28.2 G/DL (ref 31–37)
MCV RBC AUTO: 72.6 FL
MONOCYTES # BLD AUTO: 0.67 X10(3) UL (ref 0.1–1)
MONOCYTES NFR BLD AUTO: 11.8 %
NEUTROPHILS # BLD AUTO: 2.88 X10 (3) UL (ref 1.5–7.7)
NEUTROPHILS # BLD AUTO: 2.88 X10(3) UL (ref 1.5–7.7)
NEUTROPHILS NFR BLD AUTO: 50.5 %
OSMOLALITY SERPL CALC.SUM OF ELEC: 287 MOSM/KG (ref 275–295)
PLATELET # BLD AUTO: 364 10(3)UL (ref 150–450)
POTASSIUM SERPL-SCNC: 4.1 MMOL/L (ref 3.5–5.1)
PSA SERPL DL<=0.01 NG/ML-MCNC: 13.5 SECONDS (ref 12.4–14.6)
RBC # BLD AUTO: 3.51 X10(6)UL
RETICS # AUTO: 67.8 X10(3) UL (ref 22.5–147.5)
RETICS/RBC NFR AUTO: 1.9 %
RH BLOOD TYPE: POSITIVE
SARS-COV-2 RNA RESP QL NAA+PROBE: NOT DETECTED
SED RATE-ML: 59 MM/HR
SODIUM SERPL-SCNC: 137 MMOL/L (ref 136–145)
TOTAL IRON BINDING CAPACITY: 547 UG/DL (ref 240–450)
TRANSFERRIN SERPL-MCNC: 367 MG/DL (ref 200–360)
WBC # BLD AUTO: 5.7 X10(3) UL (ref 4–11)

## 2021-03-04 PROCEDURE — 99222 1ST HOSP IP/OBS MODERATE 55: CPT | Performed by: STUDENT IN AN ORGANIZED HEALTH CARE EDUCATION/TRAINING PROGRAM

## 2021-03-04 PROCEDURE — 71045 X-RAY EXAM CHEST 1 VIEW: CPT | Performed by: EMERGENCY MEDICINE

## 2021-03-04 PROCEDURE — 30233N1 TRANSFUSION OF NONAUTOLOGOUS RED BLOOD CELLS INTO PERIPHERAL VEIN, PERCUTANEOUS APPROACH: ICD-10-PCS | Performed by: HOSPITALIST

## 2021-03-04 RX ORDER — MELATONIN
3 NIGHTLY PRN
Status: DISCONTINUED | OUTPATIENT
Start: 2021-03-04 | End: 2021-03-05

## 2021-03-04 RX ORDER — SODIUM CHLORIDE 9 MG/ML
INJECTION, SOLUTION INTRAVENOUS CONTINUOUS
Status: DISCONTINUED | OUTPATIENT
Start: 2021-03-04 | End: 2021-03-05

## 2021-03-04 RX ORDER — ENOXAPARIN SODIUM 100 MG/ML
40 INJECTION SUBCUTANEOUS DAILY
Status: DISCONTINUED | OUTPATIENT
Start: 2021-03-04 | End: 2021-03-05

## 2021-03-04 RX ORDER — SODIUM CHLORIDE 9 MG/ML
INJECTION, SOLUTION INTRAVENOUS CONTINUOUS
Status: ACTIVE | OUTPATIENT
Start: 2021-03-04 | End: 2021-03-04

## 2021-03-04 RX ORDER — ONDANSETRON 2 MG/ML
4 INJECTION INTRAMUSCULAR; INTRAVENOUS EVERY 6 HOURS PRN
Status: DISCONTINUED | OUTPATIENT
Start: 2021-03-04 | End: 2021-03-05

## 2021-03-04 RX ORDER — MULTIVITAMIN WITH FOLIC ACID 400 MCG
1 TABLET ORAL DAILY
COMMUNITY

## 2021-03-04 RX ORDER — ATORVASTATIN CALCIUM 20 MG/1
20 TABLET, FILM COATED ORAL NIGHTLY
Status: DISCONTINUED | OUTPATIENT
Start: 2021-03-04 | End: 2021-03-05

## 2021-03-04 RX ORDER — ASPIRIN 81 MG/1
81 TABLET ORAL
COMMUNITY

## 2021-03-04 RX ORDER — ATENOLOL 50 MG/1
50 TABLET ORAL DAILY
Status: DISCONTINUED | OUTPATIENT
Start: 2021-03-05 | End: 2021-03-05

## 2021-03-04 RX ORDER — ASPIRIN 81 MG/1
81 TABLET ORAL DAILY
Status: DISCONTINUED | OUTPATIENT
Start: 2021-03-04 | End: 2021-03-05

## 2021-03-04 RX ORDER — METOCLOPRAMIDE HYDROCHLORIDE 5 MG/ML
10 INJECTION INTRAMUSCULAR; INTRAVENOUS EVERY 8 HOURS PRN
Status: DISCONTINUED | OUTPATIENT
Start: 2021-03-04 | End: 2021-03-05

## 2021-03-04 RX ORDER — DEXTROSE MONOHYDRATE 25 G/50ML
50 INJECTION, SOLUTION INTRAVENOUS
Status: DISCONTINUED | OUTPATIENT
Start: 2021-03-04 | End: 2021-03-05

## 2021-03-04 RX ORDER — ACETAMINOPHEN 325 MG/1
650 TABLET ORAL EVERY 6 HOURS PRN
Status: DISCONTINUED | OUTPATIENT
Start: 2021-03-04 | End: 2021-03-05

## 2021-03-04 NOTE — TELEPHONE ENCOUNTER
Dr Jr Jeffries office contacted, I spoke with Nuvia Marsh whom facilitated an appointment for pt to be seen by GI specialist-Dr Pita Washington today at 3:20. Pt notified and accepted appointment.      Future Appointments   Date Time Provider Taras Yepez   3/4/

## 2021-03-04 NOTE — TELEPHONE ENCOUNTER
Patient called back and stated that she received her lab results and was referred to see Dr. Lowell Rod. When she called to make an appointment they couldn't get her for a couple of months. She was told to have a nurse call his office to get her in ASAP.      D

## 2021-03-04 NOTE — TELEPHONE ENCOUNTER
Patient stated Dr. Jeff Morgan encouraged her to go get blood work asap due to low hemoglobin and she would like to know her results asap. Patient requested to make this urgent for someone to call her back because she is worried about her test results.  Please

## 2021-03-04 NOTE — ED INITIAL ASSESSMENT (HPI)
Onset a couple of weeks back, she reports itching and saw a dermatologist. They ran a CBC revealing anemia. She also reports dizziness, denies LOC with her dizziness. She reports bright red stools.

## 2021-03-05 ENCOUNTER — ANESTHESIA (OUTPATIENT)
Dept: ENDOSCOPY | Facility: HOSPITAL | Age: 76
End: 2021-03-05
Payer: MEDICARE

## 2021-03-05 ENCOUNTER — ANESTHESIA EVENT (OUTPATIENT)
Dept: ENDOSCOPY | Facility: HOSPITAL | Age: 76
End: 2021-03-05
Payer: MEDICARE

## 2021-03-05 VITALS
WEIGHT: 101.69 LBS | OXYGEN SATURATION: 97 % | HEART RATE: 73 BPM | BODY MASS INDEX: 18.71 KG/M2 | DIASTOLIC BLOOD PRESSURE: 47 MMHG | HEIGHT: 62 IN | SYSTOLIC BLOOD PRESSURE: 128 MMHG | RESPIRATION RATE: 19 BRPM | TEMPERATURE: 98 F

## 2021-03-05 LAB
ANION GAP SERPL CALC-SCNC: 3 MMOL/L (ref 0–18)
ATRIAL RATE: 77 BPM
BASOPHILS # BLD AUTO: 0.06 X10(3) UL (ref 0–0.2)
BASOPHILS NFR BLD AUTO: 1.2 %
BUN BLD-MCNC: 15 MG/DL (ref 7–18)
BUN/CREAT SERPL: 17.9 (ref 10–20)
C DIFF TOX B STL QL: NEGATIVE
CALCIUM BLD-MCNC: 8.3 MG/DL (ref 8.5–10.1)
CHLORIDE SERPL-SCNC: 112 MMOL/L (ref 98–112)
CO2 SERPL-SCNC: 28 MMOL/L (ref 21–32)
CREAT BLD-MCNC: 0.84 MG/DL
DEPRECATED RDW RBC AUTO: 45.5 FL (ref 35.1–46.3)
EOSINOPHIL # BLD AUTO: 0.57 X10(3) UL (ref 0–0.7)
EOSINOPHIL NFR BLD AUTO: 11.4 %
ERYTHROCYTE [DISTWIDTH] IN BLOOD BY AUTOMATED COUNT: 16.6 % (ref 11–15)
GLUCOSE BLD-MCNC: 104 MG/DL (ref 70–99)
GLUCOSE BLD-MCNC: 105 MG/DL (ref 70–99)
GLUCOSE BLD-MCNC: 114 MG/DL (ref 70–99)
GLUCOSE BLD-MCNC: 117 MG/DL (ref 70–99)
HCT VFR BLD AUTO: 28.2 %
HGB BLD-MCNC: 8.3 G/DL
HGB BLD-MCNC: 8.7 G/DL
IMM GRANULOCYTES # BLD AUTO: 0 X10(3) UL (ref 0–1)
IMM GRANULOCYTES NFR BLD: 0 %
LYMPHOCYTES # BLD AUTO: 1.63 X10(3) UL (ref 1–4)
LYMPHOCYTES NFR BLD AUTO: 32.6 %
MCH RBC QN AUTO: 22.2 PG (ref 26–34)
MCHC RBC AUTO-ENTMCNC: 29.4 G/DL (ref 31–37)
MCV RBC AUTO: 75.4 FL
MONOCYTES # BLD AUTO: 0.52 X10(3) UL (ref 0.1–1)
MONOCYTES NFR BLD AUTO: 10.4 %
NEUTROPHILS # BLD AUTO: 2.22 X10 (3) UL (ref 1.5–7.7)
NEUTROPHILS # BLD AUTO: 2.22 X10(3) UL (ref 1.5–7.7)
NEUTROPHILS NFR BLD AUTO: 44.4 %
OSMOLALITY SERPL CALC.SUM OF ELEC: 297 MOSM/KG (ref 275–295)
P AXIS: 56 DEGREES
P-R INTERVAL: 148 MS
PLATELET # BLD AUTO: 300 10(3)UL (ref 150–450)
POTASSIUM SERPL-SCNC: 4.5 MMOL/L (ref 3.5–5.1)
Q-T INTERVAL: 360 MS
QRS DURATION: 74 MS
QTC CALCULATION (BEZET): 407 MS
R AXIS: 14 DEGREES
RBC # BLD AUTO: 3.74 X10(6)UL
SODIUM SERPL-SCNC: 143 MMOL/L (ref 136–145)
T AXIS: 46 DEGREES
TSI SER-ACNC: 1.54 MIU/ML (ref 0.36–3.74)
VENTRICULAR RATE: 77 BPM
WBC # BLD AUTO: 5 X10(3) UL (ref 4–11)

## 2021-03-05 PROCEDURE — 0DB48ZX EXCISION OF ESOPHAGOGASTRIC JUNCTION, VIA NATURAL OR ARTIFICIAL OPENING ENDOSCOPIC, DIAGNOSTIC: ICD-10-PCS | Performed by: INTERNAL MEDICINE

## 2021-03-05 PROCEDURE — 0DB68ZX EXCISION OF STOMACH, VIA NATURAL OR ARTIFICIAL OPENING ENDOSCOPIC, DIAGNOSTIC: ICD-10-PCS | Performed by: INTERNAL MEDICINE

## 2021-03-05 PROCEDURE — 99232 SBSQ HOSP IP/OBS MODERATE 35: CPT | Performed by: HOSPITALIST

## 2021-03-05 PROCEDURE — 0DBQ8ZX EXCISION OF ANUS, VIA NATURAL OR ARTIFICIAL OPENING ENDOSCOPIC, DIAGNOSTIC: ICD-10-PCS | Performed by: INTERNAL MEDICINE

## 2021-03-05 PROCEDURE — 0DB98ZX EXCISION OF DUODENUM, VIA NATURAL OR ARTIFICIAL OPENING ENDOSCOPIC, DIAGNOSTIC: ICD-10-PCS | Performed by: INTERNAL MEDICINE

## 2021-03-05 RX ORDER — NALOXONE HYDROCHLORIDE 0.4 MG/ML
80 INJECTION, SOLUTION INTRAMUSCULAR; INTRAVENOUS; SUBCUTANEOUS AS NEEDED
Status: CANCELLED | OUTPATIENT
Start: 2021-03-05 | End: 2021-03-05

## 2021-03-05 RX ORDER — SODIUM PHOSPHATE,MONO-DIBASIC 19G-7G/118
1 ENEMA (ML) RECTAL ONCE
Status: COMPLETED | OUTPATIENT
Start: 2021-03-05 | End: 2021-03-05

## 2021-03-05 RX ORDER — DIPHENHYDRAMINE HCL 25 MG
25 CAPSULE ORAL EVERY 4 HOURS PRN
Status: DISCONTINUED | OUTPATIENT
Start: 2021-03-05 | End: 2021-03-05

## 2021-03-05 RX ORDER — DIPHENHYDRAMINE HYDROCHLORIDE 50 MG/ML
12.5 INJECTION INTRAMUSCULAR; INTRAVENOUS EVERY 4 HOURS PRN
Status: DISCONTINUED | OUTPATIENT
Start: 2021-03-05 | End: 2021-03-05

## 2021-03-05 RX ORDER — LIDOCAINE HYDROCHLORIDE 10 MG/ML
INJECTION, SOLUTION EPIDURAL; INFILTRATION; INTRACAUDAL; PERINEURAL AS NEEDED
Status: DISCONTINUED | OUTPATIENT
Start: 2021-03-05 | End: 2021-03-05 | Stop reason: SURG

## 2021-03-05 RX ORDER — DEXTROSE MONOHYDRATE 25 G/50ML
50 INJECTION, SOLUTION INTRAVENOUS
Status: CANCELLED | OUTPATIENT
Start: 2021-03-05

## 2021-03-05 RX ORDER — SODIUM CHLORIDE, SODIUM LACTATE, POTASSIUM CHLORIDE, CALCIUM CHLORIDE 600; 310; 30; 20 MG/100ML; MG/100ML; MG/100ML; MG/100ML
INJECTION, SOLUTION INTRAVENOUS CONTINUOUS
Status: CANCELLED | OUTPATIENT
Start: 2021-03-05

## 2021-03-05 RX ORDER — HYDROCORTISONE 0.5 G/100G
CREAM TOPICAL 2 TIMES DAILY
Status: DISCONTINUED | OUTPATIENT
Start: 2021-03-05 | End: 2021-03-05

## 2021-03-05 RX ORDER — METOCLOPRAMIDE HYDROCHLORIDE 5 MG/ML
10 INJECTION INTRAMUSCULAR; INTRAVENOUS AS NEEDED
Status: CANCELLED | OUTPATIENT
Start: 2021-03-05 | End: 2021-03-05

## 2021-03-05 RX ORDER — MESALAMINE 1000 MG/1
1000 SUPPOSITORY RECTAL DAILY
Status: DISCONTINUED | OUTPATIENT
Start: 2021-03-05 | End: 2021-03-05

## 2021-03-05 RX ORDER — ONDANSETRON 2 MG/ML
4 INJECTION INTRAMUSCULAR; INTRAVENOUS AS NEEDED
Status: CANCELLED | OUTPATIENT
Start: 2021-03-05 | End: 2021-03-05

## 2021-03-05 RX ADMIN — SODIUM CHLORIDE: 9 INJECTION, SOLUTION INTRAVENOUS at 14:56:00

## 2021-03-05 RX ADMIN — LIDOCAINE HYDROCHLORIDE 50 MG: 10 INJECTION, SOLUTION EPIDURAL; INFILTRATION; INTRACAUDAL; PERINEURAL at 14:25:00

## 2021-03-05 NOTE — CM/SW NOTE
Patient failed inpatient criteria. Second level of review completed and supports observation. UR committee in agreement. Discussed with Dr. Erika Crespo  who approves observation status. MOON given to the patient and order written.

## 2021-03-05 NOTE — CM/SW NOTE
Patient failed inpatient criteria. Second level of review completed and supports observation. UR committee in agreement. Discussed with Dr. Lillian Calhoun who approves observation status. MOON given to the patient and order written.

## 2021-03-05 NOTE — PROGRESS NOTES
[facility]  Progress Note     Lonita Smaller Patient Status:  Inpatient    10/15/1945 MRN [id number]   [facility] 4NW-A Attending Geri Syed MD   Hosp Day # 1 PCP Daren Monteiro MD     Chief Complaint: rectal bleeding    S: Patie TROP, CK in the last 168 hours. Lab Results   Component Value Date    TSH 1.540 03/05/2021       Imaging: Imaging data reviewed in Epic.     Medications:   • FLEET ENEMA  1 enema Rectal Once   • mesalamine  1,000 mg Rectal Daily   • enoxaparin  40 mg Sub

## 2021-03-05 NOTE — CONSULTS
[facility]  Report of Consultation    Ardanii Mcgeealmaz Patient Status:  Inpatient    10/15/1945 MRN [id number]   [facility] 4NW-A Attending Torito Sherman MD   Hosp Day # 1 PCP Lakshmi Harrington MD     Reason for Consultation:  Rectal bleedi severe active chronic inflammation. In 2011 again underwent a flex sig showing persistent inflammation with shallow ulcerations in the pouch. Treatment was switched to Lialda.    Patient states that after switching to Elizabeth Daunt has generally been asymptomatic PHYSIATRY (INTERNAL)     • SIGMOIDOSCOPY,DIAGNOSTIC         Family History   Problem Relation Age of Onset   • Heart Surgery Father         CABG   • Diabetes Mother         Mellitus         reports that she has never smoked.  She has never used smokeless to Take 1,000 mg by mouth daily. , Disp: , Rfl:     •  metFORMIN HCl 500 MG Oral Tab, Take 500 mg by mouth 3 (three) times daily.   , Disp: , Rfl: 5    •  atorvastatin 40 MG Oral Tab, Take 20 mg by mouth nightly.  , Disp: , Rfl: 6    •  atenolol 50 MG Oral Tab, intact  NEURO:  Oriented x 3   BACK: no CVA tenderness  PSYCH: appropriate for the exam          LAB/IMAGING RESULTS:     Lab Results   Component Value Date    PTT 28.1 03/04/2021    INR 1.00 03/04/2021     Recent Labs   Lab 03/04/21  1636   *   BUN

## 2021-03-05 NOTE — H&P
[facility]  History and Physical     Krish Crystal Patient Status:  Inpatient    10/15/1945 MRN [id number]   [facility] 4NW-A Attending Jani Caldwell, [address] Day # 0 PCP Shavon Feliciano MD     Chief Complaint: anemia History:  reports that she has never smoked. She has never used smokeless tobacco. She reports that she does not drink alcohol or use drugs.     Family History:   Family History   Problem Relation Age of Onset   • Heart Surgery Father         CABG   • Diabe deformity. Abdomen: Soft, nontender, nondistended. Positive bowel sounds. No rebound, guarding or organomegaly. Neurologic: No focal neurological deficits. CNII-XII grossly intact. Musculoskeletal: Moves all extremities.   Extremities: No edema or cyano

## 2021-03-05 NOTE — ANESTHESIA POSTPROCEDURE EVALUATION
[address] [facility] Patient Status:  Observation   Age/Gender 77year old female MRN [id number]   Location [address] [facility]. Attending Geri Syed MD   [facility] Day # 1 PCP Daren Monteiro MD       Anesthesia Post-op Note    Procedure(s):

## 2021-03-05 NOTE — ANESTHESIA PREPROCEDURE EVALUATION
PRE-OP EVALUATION    Patient Name: Harrison Rubi    Pre-op Diagnosis: Pain [R52]    Procedure(s):  ESOPHAGOGASTRODUODENOSCOPY AND FLEXIBLE SIGMOIDOSCOPY      Surgeon(s) and Role:     * Mars Bravo MD - Primary    Pre-op vitals reviewed.   Temp: 97.3 ° Oral, Daily        Outpatient Medications:     •  Multiple Vitamins-Minerals (TAB-A-SHLOMO) Oral Tab, Take 1 tablet by mouth daily. , Disp: , Rfl: , 3/3/2021 at 0900    •  aspirin EC 81 MG Oral Tab EC, Take 81 mg by mouth daily. , Disp: , Rfl: , 3/3/2021 at 09 5.0 03/05/2021    WBC 5.1 12/14/2020    RBC 3.74 (L) 03/05/2021    RBC 3.99 12/14/2020    HGB 8.3 (L) 03/05/2021    HGB 9.9 (L) 12/14/2020    HCT 28.2 (L) 03/05/2021    HCT 32.9 (L) 12/14/2020    MCV 75.4 (L) 03/05/2021    MCV 82.5 12/14/2020    MCH 22.2 (

## 2021-03-05 NOTE — OPERATIVE REPORT
ENDOSCOPY OPERATIVE REPORT    Patient Name:  Ivette Hernandez  Medical Record #: [id number]  YOB: 1945  Date of Procedure: 3/5/2021    Preoperative Diagnosis:  Pouchitis, rectal bleeding, iron deficiency anemia    Postoperative Diagnosis:   1) areas were biopsied. The Z-line otherwise occurred at the top of the gastric folds. 3 cm hiatal hernia.  camerons erosion noted, non bleeding    The gastric lumen was then entered and views of the gastric mucosa as well as retroverted views of the fundus further inpatient GI work up planned now    Okay to follow up with Dr Shankar Terrell as outpatient    Will sign off for now, okay to adv diet    I called and spoke with patient's son and pt afterward by phone and with pt/ in person      Illa Schwab, M

## 2021-03-05 NOTE — PLAN OF CARE
NURSING ADMISSION NOTE      Patient admitted via Cart  Oriented to room. Safety precautions initiated. Bed in low position. Call light in reach. Pt here with symptomatic anemia, only complains of dizziness if standing up too fast. Hgb 7.2.  Orders

## 2021-03-05 NOTE — ED PROVIDER NOTES
Patient Seen in: [facility] Emergency Department      History   Patient presents with:  Dizziness  Abnormal Labs  GI Bleeding    Stated Complaint: Low HgB readings with dizziness (7.3), GI bleed    HPI/Subjective:   HPI    67-year-old female here fo No      Alcohol/week: 0.0 standard drinks    Drug use: No             Review of Systems    Positive for stated complaint: Low HgB readings with dizziness (7.3), GI bleed  Other systems are as noted in HPI. Constitutional and vital signs reviewed.       All Please view results for these tests on the individual orders. TYPE AND SCREEN    Narrative: The following orders were created for panel order TYPE AND SCREEN.   Procedure                               Abnormality         Status Disposition and Plan     Clinical Impression:  Symptomatic anemia  (primary encounter diagnosis)  Pouchitis ([facility]    Disposition:  Admit  3/4/2021  6:20 pm    Follow-up:  No follow-up provider specified.         Medications Prescrib

## 2021-03-06 LAB — BLOOD TYPE BARCODE: 5100

## 2021-03-06 NOTE — PLAN OF CARE
Pt. A&O x4. VSS. Afebrile. Fleet enema x2 given to prep for fleg/sig today. C.diff negative. Hgb remains stable at this time. Call light within reach. Will continue to monitor. 1800: Pt. Tolerated flex/sig well. No complications. Pt. Tolerating diet.  [id number] Edna Hernandez

## 2021-03-06 NOTE — DISCHARGE SUMMARY
[facility]  DISCHARGE SUMMARY     Nicole Goldstein Patient Status:  Observation    10/15/1945 MRN [id number]   [facility] 4NW-A Attending No att. providers found   [facility] Day # 1 PCP Hailey Ball MD     Date of Admission: 3/4/2021  Date Medications      CONTINUE taking these medications      Instructions Prescription details   aspirin EC 81 MG Tbec      Take 81 mg by mouth daily. Refills: 0     atenolol 50 MG Tabs  Commonly known as: TENORMIN      Take 50 mg by mouth daily.    Refills: 1 accompany you to a physician visit. -No visitors are allowed in the infusion area.  -All visitors and patients will be screened for a temperatue greater than 100 degrees before entering our sites. -We encourage patients, who are receiving treatments in the

## 2021-03-08 ENCOUNTER — TELEPHONE (OUTPATIENT)
Dept: INTERNAL MEDICINE CLINIC | Facility: CLINIC | Age: 76
End: 2021-03-08

## 2021-03-08 NOTE — TELEPHONE ENCOUNTER
Pt stated a nurse was supposed to call her back to let her know about which med she is supposed to take for her iron, pt also is requesting to talk to the nurse regarding her er visit.

## 2021-03-08 NOTE — TELEPHONE ENCOUNTER
Misha Pradhan MD   3/4/2021  5:34 AM CST       Reviewed results   Hb is still low  She should see Dr Nakita Franz for evaluation   Also give her the tel # for Dr Wanda Gaffney   She may need further w/u for hew anemia   To start slow fe 1 bid  145 mg   Go to

## 2021-03-10 NOTE — PROGRESS NOTES
[facility] Note    Visit Diagnosis:  Iron deficiency anemia due to chronic blood loss  (primary encounter diagnosis)  Itching  Fatigue, unspecified type    History of Present Illness: 75F with a PMH of CAD, DVT, DM2, Osteoporosis HPI    mesalamine 1000 MG Rectal Suppos, UNWRAP & INSERT 1 SUPPOSITORY RECTALLY TWICE DAILY, Disp: , Rfl:   Multiple Vitamins-Minerals (TAB-A-SHLOMO) Oral Tab, Take 1 tablet by mouth daily. , Disp: , Rfl:   aspirin EC 81 MG Oral Tab EC, Take 81 mg by mouth da enterocolitis ([facility]    • Ulcerative colitis ([facility]     Dr. Milton Martinez   • Weight loss      Past Surgical History:   Procedure Laterality Date   • ANGIOPLASTY (CORONARY)     • COLECTOMY     • ESOPHAGOGASTRODUODENOSCOPY (EGD) N/A 3/5/2021    Performed by Carlos Chan Lab Results   Component Value Date     03/05/2021    K 4.5 03/05/2021    CO2 28.0 03/05/2021     03/05/2021    BUN 15 03/05/2021    ALB 2.9 (L) 03/04/2021       Radiology: Reviewed     Pathology:   3/5/21: EGD/Colon  A.   Duodenal biopsy:  - cholangiocarcinoma. CMP/T bili is normal.   - CT CAP ordered     Health Maintenance   - Due for mammogram this year     [facility]

## 2021-03-11 ENCOUNTER — OFFICE VISIT (OUTPATIENT)
Dept: HEMATOLOGY/ONCOLOGY | Facility: HOSPITAL | Age: 76
End: 2021-03-11
Attending: INTERNAL MEDICINE
Payer: MEDICARE

## 2021-03-11 VITALS
HEART RATE: 65 BPM | BODY MASS INDEX: 18.75 KG/M2 | HEIGHT: 62.01 IN | DIASTOLIC BLOOD PRESSURE: 81 MMHG | OXYGEN SATURATION: 98 % | SYSTOLIC BLOOD PRESSURE: 155 MMHG | TEMPERATURE: 96 F | RESPIRATION RATE: 16 BRPM | WEIGHT: 103.19 LBS

## 2021-03-11 DIAGNOSIS — D50.0 IRON DEFICIENCY ANEMIA DUE TO CHRONIC BLOOD LOSS: Primary | ICD-10-CM

## 2021-03-11 DIAGNOSIS — L29.9 ITCHING: ICD-10-CM

## 2021-03-11 DIAGNOSIS — R53.83 FATIGUE, UNSPECIFIED TYPE: ICD-10-CM

## 2021-03-11 PROCEDURE — 99205 OFFICE O/P NEW HI 60 MIN: CPT | Performed by: INTERNAL MEDICINE

## 2021-03-11 RX ORDER — MESALAMINE 1000 MG/1
SUPPOSITORY RECTAL AS NEEDED
COMMUNITY
Start: 2021-03-09

## 2021-03-11 NOTE — PROGRESS NOTES
Education Record    Learner:  Patient    Disease / [place] Needs     Barriers / Limitations:  None   Comments:    Method:  Discussion   Comments:    General Topics:  Plan of care reviewed   Comments:    Outcome:  Shows understanding   Comments:    Lizzie

## 2021-03-13 ENCOUNTER — OFFICE VISIT (OUTPATIENT)
Dept: INTERNAL MEDICINE CLINIC | Facility: CLINIC | Age: 76
End: 2021-03-13
Payer: MEDICARE

## 2021-03-13 VITALS
WEIGHT: 105.63 LBS | SYSTOLIC BLOOD PRESSURE: 124 MMHG | TEMPERATURE: 98 F | RESPIRATION RATE: 14 BRPM | DIASTOLIC BLOOD PRESSURE: 66 MMHG | BODY MASS INDEX: 19.44 KG/M2 | HEIGHT: 62 IN | HEART RATE: 76 BPM | OXYGEN SATURATION: 98 %

## 2021-03-13 DIAGNOSIS — D64.9 SYMPTOMATIC ANEMIA: ICD-10-CM

## 2021-03-13 DIAGNOSIS — K51.819 OTHER ULCERATIVE COLITIS WITH COMPLICATION (HCC): Primary | ICD-10-CM

## 2021-03-13 PROCEDURE — 99495 TRANSJ CARE MGMT MOD F2F 14D: CPT | Performed by: INTERNAL MEDICINE

## 2021-03-13 PROCEDURE — 1111F DSCHRG MED/CURRENT MED MERGE: CPT | Performed by: INTERNAL MEDICINE

## 2021-03-13 NOTE — PROGRESS NOTES
Shruthi Brody  [birth year] is a 77year old female. Patient presents with:  ER F/U      HPI:   Post hospital follow-up. Patient did see the hematologist.  He had an upper and lower scope he done has a CT scan lined up on .   Patient is also get Never Smoker      Smokeless tobacco: Never Used    Vaping Use      Vaping Use: Never used    Alcohol use: No      Alcohol/week: 0.0 standard drinks    Drug use: No       REVIEW OF SYSTEMS:       ROS:   Gastrointestinal:   Reflux no. Abdominal pain no.  Appe

## 2021-03-16 ENCOUNTER — HOSPITAL ENCOUNTER (OUTPATIENT)
Dept: CT IMAGING | Age: 76
Discharge: HOME | End: 2021-03-16
Attending: INTERNAL MEDICINE
Payer: MEDICARE

## 2021-03-16 ENCOUNTER — HOSPITAL ENCOUNTER (OUTPATIENT)
Dept: CT IMAGING | Facility: HOSPITAL | Age: 76
Discharge: HOME | End: 2021-03-16
Attending: INTERNAL MEDICINE
Payer: MEDICARE

## 2021-03-16 DIAGNOSIS — L29.9 ITCHING: ICD-10-CM

## 2021-03-16 DIAGNOSIS — R53.83 FATIGUE, UNSPECIFIED TYPE: ICD-10-CM

## 2021-03-16 DIAGNOSIS — D50.0 IRON DEFICIENCY ANEMIA DUE TO CHRONIC BLOOD LOSS: ICD-10-CM

## 2021-03-16 PROCEDURE — 71260 CT THORAX DX C+: CPT | Performed by: INTERNAL MEDICINE

## 2021-03-16 PROCEDURE — 76937 US GUIDE VASCULAR ACCESS: CPT

## 2021-03-16 PROCEDURE — 74177 CT ABD & PELVIS W/CONTRAST: CPT | Performed by: INTERNAL MEDICINE

## 2021-03-16 PROCEDURE — 36410 VNPNXR 3YR/> PHY/QHP DX/THER: CPT

## 2021-03-17 ENCOUNTER — TELEPHONE (OUTPATIENT)
Dept: HEMATOLOGY/ONCOLOGY | Facility: HOSPITAL | Age: 76
End: 2021-03-17

## 2021-03-17 ENCOUNTER — TELEPHONE (OUTPATIENT)
Dept: INTERNAL MEDICINE CLINIC | Facility: CLINIC | Age: 76
End: 2021-03-17

## 2021-03-17 NOTE — TELEPHONE ENCOUNTER
Patient inquiring on the followin.. Per CT ordered by Dr. [place] Onofre, showing gallbladder stones and is wondering whether this could be a cause for her skin issues as she read it somewhere.  (FYI patient states she has contacted GI to schedule appt per

## 2021-03-17 NOTE — TELEPHONE ENCOUNTER
CT reviewed with patient. No evidence of hematoma or bleeding. No LAD. GB filled with gallstones.  She will discuss with GI.

## 2021-03-17 NOTE — TELEPHONE ENCOUNTER
Spoke with patient. She verbalized understanding. She is very concerned about stones noted in gallbladder. MD to call patient to discuss. Nancie Echols MD  P [facility]  Results reviewed. CT CAP looks good. No evidence of bleeding noted.

## 2021-03-18 ENCOUNTER — APPOINTMENT (OUTPATIENT)
Dept: HEMATOLOGY/ONCOLOGY | Facility: HOSPITAL | Age: 76
End: 2021-03-18
Attending: INTERNAL MEDICINE
Payer: MEDICARE

## 2021-03-19 ENCOUNTER — VIRTUAL PHONE E/M (OUTPATIENT)
Dept: INTERNAL MEDICINE CLINIC | Facility: CLINIC | Age: 76
End: 2021-03-19
Payer: MEDICARE

## 2021-03-19 ENCOUNTER — OFFICE VISIT (OUTPATIENT)
Dept: HEMATOLOGY/ONCOLOGY | Facility: HOSPITAL | Age: 76
End: 2021-03-19
Attending: INTERNAL MEDICINE
Payer: MEDICARE

## 2021-03-19 VITALS
RESPIRATION RATE: 16 BRPM | OXYGEN SATURATION: 100 % | HEART RATE: 75 BPM | DIASTOLIC BLOOD PRESSURE: 87 MMHG | SYSTOLIC BLOOD PRESSURE: 155 MMHG | TEMPERATURE: 98 F

## 2021-03-19 DIAGNOSIS — L29.9 PRURITUS: Primary | ICD-10-CM

## 2021-03-19 DIAGNOSIS — D50.0 IRON DEFICIENCY ANEMIA DUE TO CHRONIC BLOOD LOSS: Primary | ICD-10-CM

## 2021-03-19 DIAGNOSIS — R53.83 FATIGUE, UNSPECIFIED TYPE: ICD-10-CM

## 2021-03-19 DIAGNOSIS — L29.9 ITCHING: ICD-10-CM

## 2021-03-19 PROBLEM — N20.0 RENAL STONES: Status: ACTIVE | Noted: 2021-03-19

## 2021-03-19 PROBLEM — K80.20 GALLSTONES: Chronic | Status: ACTIVE | Noted: 2021-03-19

## 2021-03-19 PROBLEM — K80.20 GALLSTONES: Status: ACTIVE | Noted: 2021-03-19

## 2021-03-19 LAB
ALBUMIN SERPL-MCNC: 3.1 G/DL (ref 3.4–5)
ALBUMIN/GLOB SERPL: 0.7 {RATIO} (ref 1–2)
ALP LIVER SERPL-CCNC: 95 U/L
ALT SERPL-CCNC: 19 U/L
ANION GAP SERPL CALC-SCNC: 4 MMOL/L (ref 0–18)
AST SERPL-CCNC: 21 U/L (ref 15–37)
BASOPHILS # BLD AUTO: 0.05 X10(3) UL (ref 0–0.2)
BASOPHILS NFR BLD AUTO: 1 %
BILIRUB SERPL-MCNC: 0.7 MG/DL (ref 0.1–2)
BUN BLD-MCNC: 11 MG/DL (ref 7–18)
BUN/CREAT SERPL: 12.1 (ref 10–20)
CALCIUM BLD-MCNC: 9.1 MG/DL (ref 8.5–10.1)
CHLORIDE SERPL-SCNC: 104 MMOL/L (ref 98–112)
CO2 SERPL-SCNC: 28 MMOL/L (ref 21–32)
CREAT BLD-MCNC: 0.91 MG/DL
DEPRECATED RDW RBC AUTO: 54 FL (ref 35.1–46.3)
EOSINOPHIL # BLD AUTO: 0.33 X10(3) UL (ref 0–0.7)
EOSINOPHIL NFR BLD AUTO: 6.8 %
ERYTHROCYTE [DISTWIDTH] IN BLOOD BY AUTOMATED COUNT: 20.5 % (ref 11–15)
GLOBULIN PLAS-MCNC: 4.3 G/DL (ref 2.8–4.4)
GLUCOSE BLD-MCNC: 111 MG/DL (ref 70–99)
HCT VFR BLD AUTO: 33.9 %
HGB BLD-MCNC: 9.8 G/DL
IMM GRANULOCYTES # BLD AUTO: 0.01 X10(3) UL (ref 0–1)
IMM GRANULOCYTES NFR BLD: 0.2 %
LYMPHOCYTES # BLD AUTO: 1.43 X10(3) UL (ref 1–4)
LYMPHOCYTES NFR BLD AUTO: 29.4 %
M PROTEIN MFR SERPL ELPH: 7.4 G/DL (ref 6.4–8.2)
MCH RBC QN AUTO: 22.1 PG (ref 26–34)
MCHC RBC AUTO-ENTMCNC: 28.9 G/DL (ref 31–37)
MCV RBC AUTO: 76.4 FL
MONOCYTES # BLD AUTO: 0.4 X10(3) UL (ref 0.1–1)
MONOCYTES NFR BLD AUTO: 8.2 %
NEUTROPHILS # BLD AUTO: 2.64 X10 (3) UL (ref 1.5–7.7)
NEUTROPHILS # BLD AUTO: 2.64 X10(3) UL (ref 1.5–7.7)
NEUTROPHILS NFR BLD AUTO: 54.4 %
OSMOLALITY SERPL CALC.SUM OF ELEC: 282 MOSM/KG (ref 275–295)
PLATELET # BLD AUTO: 418 10(3)UL (ref 150–450)
POTASSIUM SERPL-SCNC: 4.2 MMOL/L (ref 3.5–5.1)
RBC # BLD AUTO: 4.44 X10(6)UL
SODIUM SERPL-SCNC: 136 MMOL/L (ref 136–145)
WBC # BLD AUTO: 4.9 X10(3) UL (ref 4–11)

## 2021-03-19 PROCEDURE — 96376 TX/PRO/DX INJ SAME DRUG ADON: CPT

## 2021-03-19 PROCEDURE — 99442 PHONE E/M BY PHYS 11-20 MIN: CPT | Performed by: INTERNAL MEDICINE

## 2021-03-19 PROCEDURE — 96365 THER/PROPH/DIAG IV INF INIT: CPT

## 2021-03-19 PROCEDURE — 80053 COMPREHEN METABOLIC PANEL: CPT

## 2021-03-19 PROCEDURE — 85025 COMPLETE CBC W/AUTO DIFF WBC: CPT

## 2021-03-19 RX ORDER — ALPRAZOLAM 0.25 MG/1
0.25 TABLET ORAL NIGHTLY PRN
Refills: 0 | Status: CANCELLED | OUTPATIENT
Start: 2021-03-19

## 2021-03-19 NOTE — PROGRESS NOTES
Virtual Telephone Check-In    Nicole Goldstein verbally consents to a Virtual/Telephone Check-In visit on 03/19/21. Patient has been referred to the [facility] website at [email or web address] to review the yearly Consent to Treat document.     Patient understand

## 2021-03-19 NOTE — PROGRESS NOTES
Education Record    Learner:  Patient    Disease / Diagnosis: anemia - infed infusion. Patient tolerated well without issue. Per MD, pt needs to follow up with GI regarding gallstones. Aware and calling GI today. Pt also aware to have labs done in 6 weeks.

## 2021-04-02 ENCOUNTER — APPOINTMENT (OUTPATIENT)
Dept: CT IMAGING | Age: 76
End: 2021-04-02
Attending: PHYSICIAN ASSISTANT
Payer: MEDICARE

## 2021-04-02 ENCOUNTER — HOSPITAL ENCOUNTER (OUTPATIENT)
Age: 76
Discharge: HOME | End: 2021-04-02
Payer: MEDICARE

## 2021-04-02 VITALS
OXYGEN SATURATION: 100 % | DIASTOLIC BLOOD PRESSURE: 60 MMHG | TEMPERATURE: 99 F | SYSTOLIC BLOOD PRESSURE: 160 MMHG | RESPIRATION RATE: 16 BRPM | HEART RATE: 66 BPM

## 2021-04-02 DIAGNOSIS — N30.90 CYSTITIS: Primary | ICD-10-CM

## 2021-04-02 PROCEDURE — 81002 URINALYSIS NONAUTO W/O SCOPE: CPT | Performed by: PHYSICIAN ASSISTANT

## 2021-04-02 PROCEDURE — 36415 COLL VENOUS BLD VENIPUNCTURE: CPT

## 2021-04-02 PROCEDURE — 87186 SC STD MICRODIL/AGAR DIL: CPT | Performed by: PHYSICIAN ASSISTANT

## 2021-04-02 PROCEDURE — 85025 COMPLETE CBC W/AUTO DIFF WBC: CPT | Performed by: PHYSICIAN ASSISTANT

## 2021-04-02 PROCEDURE — 99214 OFFICE O/P EST MOD 30 MIN: CPT

## 2021-04-02 PROCEDURE — 87088 URINE BACTERIA CULTURE: CPT | Performed by: PHYSICIAN ASSISTANT

## 2021-04-02 PROCEDURE — 80047 BASIC METABLC PNL IONIZED CA: CPT

## 2021-04-02 PROCEDURE — 87086 URINE CULTURE/COLONY COUNT: CPT | Performed by: PHYSICIAN ASSISTANT

## 2021-04-02 PROCEDURE — 74176 CT ABD & PELVIS W/O CONTRAST: CPT | Performed by: PHYSICIAN ASSISTANT

## 2021-04-02 PROCEDURE — 81002 URINALYSIS NONAUTO W/O SCOPE: CPT | Performed by: EMERGENCY MEDICINE

## 2021-04-02 RX ORDER — CEPHALEXIN 500 MG/1
500 CAPSULE ORAL 3 TIMES DAILY
Qty: 21 CAPSULE | Refills: 0 | Status: SHIPPED | OUTPATIENT
Start: 2021-04-02 | End: 2021-04-09

## 2021-04-02 NOTE — ED INITIAL ASSESSMENT (HPI)
C/o lower abdominal pain since last night with no control of urine and burning sensation and states \"feels like something stuck in the urethra. Denies nausea/vomiting/diarrhea. Hx of passing out kidney stones.

## 2021-04-02 NOTE — ED PROVIDER NOTES
Patient Seen in: [facility]      History   Patient presents with:  Abdominal Pain    Stated Complaint: LOWER ABDOMINAL PAIN    HPI/Subjective:   HPI    43-year-old female with past medical history as noted below presents to the immediate Alcohol use: No      Alcohol/week: 0.0 standard drinks    Drug use: No             Review of Systems    Positive for stated complaint: LOWER ABDOMINAL PAIN  Other systems are as noted in HPI. Constitutional and vital signs reviewed.       All other systems limits   POCT ISTAT CHEM8 CARTRIDGE - Abnormal; Notable for the following components:    ISTAT Glucose 183 (*)     All other components within normal limits   URINE CULTURE, ROUTINE         CT ABDOMEN+PELVIS KIDNEYSTONE 2D RNDR(NO IV,NO ORAL)(CPT=74176) ORGANS:  Urinary bladder wall thickening is concerning for cystitis. Clinical correlation recommended. Unremarkable uterus and ovaries. LYMPH NODES:  No lymphadenopathy in the abdomen or pelvis. BONES:  Degenerative changes in the spine and hips.  OTHER:

## 2021-04-06 ENCOUNTER — TELEPHONE (OUTPATIENT)
Dept: INTERNAL MEDICINE CLINIC | Facility: CLINIC | Age: 76
End: 2021-04-06

## 2021-04-06 NOTE — TELEPHONE ENCOUNTER
Dr Rachana Ellington with [employer] placed imaging and lab orders. Pt would like to have such orders completed with Lucita Torres.      I advised pt that she can bring orders into an [facility] facility where they will be able to assist with placing orders in our sys

## 2021-04-06 NOTE — TELEPHONE ENCOUNTER
Patient stated she dropped off paperwork yesterday because she needs an MRI order. Patient informed she has an order in the system for an MRI from another doctor. Patient stated she is supposed to have one from Dr. Lior Corral and it is taking too long.  Please

## 2021-04-07 ENCOUNTER — TELEPHONE (OUTPATIENT)
Dept: HEMATOLOGY/ONCOLOGY | Facility: HOSPITAL | Age: 76
End: 2021-04-07

## 2021-04-07 NOTE — TELEPHONE ENCOUNTER
Kj called asking to speak with Pinky Malone regarding her infusion she had 3/19. She says she spoke with her GI doctor.  Please call

## 2021-04-07 NOTE — TELEPHONE ENCOUNTER
Spoke with patient. She will have MRCP and MRI with GI soon. Instructed patient to schedule lab appointment at the end of April.

## 2021-04-09 ENCOUNTER — HOSPITAL ENCOUNTER (OUTPATIENT)
Dept: MRI IMAGING | Facility: HOSPITAL | Age: 76
Discharge: HOME | End: 2021-04-09
Attending: INTERNAL MEDICINE
Payer: MEDICARE

## 2021-04-09 DIAGNOSIS — L29.9 PRURITUS: ICD-10-CM

## 2021-04-09 DIAGNOSIS — R63.4 LOSS OF WEIGHT: ICD-10-CM

## 2021-04-09 PROCEDURE — 76376 3D RENDER W/INTRP POSTPROCES: CPT | Performed by: INTERNAL MEDICINE

## 2021-04-09 PROCEDURE — 36410 VNPNXR 3YR/> PHY/QHP DX/THER: CPT

## 2021-04-09 PROCEDURE — 74183 MRI ABD W/O CNTR FLWD CNTR: CPT | Performed by: INTERNAL MEDICINE

## 2021-04-09 PROCEDURE — A9575 INJ GADOTERATE MEGLUMI 0.1ML: HCPCS | Performed by: RADIOLOGY

## 2021-04-09 PROCEDURE — 76937 US GUIDE VASCULAR ACCESS: CPT

## 2021-04-13 ENCOUNTER — HOSPITAL ENCOUNTER (OUTPATIENT)
Dept: MRI IMAGING | Facility: HOSPITAL | Age: 76
Discharge: HOME | End: 2021-04-13
Attending: INTERNAL MEDICINE
Payer: MEDICARE

## 2021-04-13 DIAGNOSIS — L29.9 PRURITUS: ICD-10-CM

## 2021-04-13 PROCEDURE — 72197 MRI PELVIS W/O & W/DYE: CPT | Performed by: INTERNAL MEDICINE

## 2021-04-13 PROCEDURE — 74183 MRI ABD W/O CNTR FLWD CNTR: CPT | Performed by: INTERNAL MEDICINE

## 2021-04-13 PROCEDURE — 36410 VNPNXR 3YR/> PHY/QHP DX/THER: CPT

## 2021-04-13 PROCEDURE — 76937 US GUIDE VASCULAR ACCESS: CPT

## 2021-04-13 PROCEDURE — A9575 INJ GADOTERATE MEGLUMI 0.1ML: HCPCS

## 2021-04-14 ENCOUNTER — TELEPHONE (OUTPATIENT)
Dept: INTERNAL MEDICINE CLINIC | Facility: CLINIC | Age: 76
End: 2021-04-14

## 2021-04-14 NOTE — TELEPHONE ENCOUNTER
Pt called and wanted to give an update on her condition. Pt stated that she is not doing any better. She would like to get a another referral for a hematologist. Pt did not go into detail on symptoms or why she believes she isnt getting better.  She would l

## 2021-04-15 NOTE — TELEPHONE ENCOUNTER
Pt called, states she has seen the specialists recommended (GI, hematology, derm). Pt reports she still has ongoing symptoms of \"itching all over her body\" and she reports she was told by GI she may have possible gallstones.     Pt asked if she would like

## 2021-04-19 ENCOUNTER — TELEPHONE (OUTPATIENT)
Dept: HEMATOLOGY/ONCOLOGY | Facility: HOSPITAL | Age: 76
End: 2021-04-19

## 2021-04-19 NOTE — TELEPHONE ENCOUNTER
Pt provided with info below. Pt states that she is most certain that the cause of her pruritis is metformin since she developed such s/s once she started/increased metformin/     Please advise if another medication can be rx. TY!

## 2021-04-19 NOTE — TELEPHONE ENCOUNTER
Per TO from , all records have been reviewed for pruritis. Pt should wait for the bx results from dermatologist and f/u with derm. TORB.

## 2021-04-19 NOTE — TELEPHONE ENCOUNTER
Robert Hickey MD  P [facility] R[facility]  Results reviewed. Incidental 3 mm RML pulmonary nodule noted. Repeat CT Chest without contrast in 6-12 months. Otherwise, appears stable. Spoke to patient, verbalized understanding.  Spoke to MD about patient

## 2021-04-23 ENCOUNTER — TELEPHONE (OUTPATIENT)
Dept: INTERNAL MEDICINE CLINIC | Facility: CLINIC | Age: 76
End: 2021-04-23

## 2021-04-23 NOTE — TELEPHONE ENCOUNTER
Pt called, states dermatology provider would like her to start prednisone. Pt concerned about potential elevated glucose levels as side effect of medication since she was previously instructed to hold off on metformin for 6 weeks. Please advise.

## 2021-04-23 NOTE — TELEPHONE ENCOUNTER
Patient called and stated that she was prescribed predisone and it caused her glucose levels to rise. She stated that she hasn't taken her metformin in 6 weeks. She would like to speak with a nurse ASAP.

## 2021-04-24 NOTE — TELEPHONE ENCOUNTER
Pt called and wanted to speak with dr Marvel Deutsch regarding her condition and provide an update. Pt advd that her dematologist wanted to know if she could start back on metform and would need the rx filled.  Advd that dr Marvel Deutsch is not here today and that we wo

## 2021-04-26 NOTE — TELEPHONE ENCOUNTER
Patient is awaiting biopsy results from Dr. Murtaza Llamas.  She also has a second opinion at the [facility] with respect to her generalized pruritus patient empirically has been advised to take possibly a Medrol Dosepak.   Informed patient that is okay from my standpoi

## 2021-04-30 ENCOUNTER — TELEPHONE (OUTPATIENT)
Dept: INTERNAL MEDICINE CLINIC | Facility: CLINIC | Age: 76
End: 2021-04-30

## 2021-04-30 DIAGNOSIS — E11.9 TYPE 2 DIABETES MELLITUS WITHOUT COMPLICATION, WITHOUT LONG-TERM CURRENT USE OF INSULIN (HCC): Primary | ICD-10-CM

## 2021-04-30 NOTE — TELEPHONE ENCOUNTER
Pt called wanting a glucose test kit and monitor so she can do it herself because her insurance does not cover it.  Please advise    Ph: [phone]

## 2021-05-03 ENCOUNTER — TELEPHONE (OUTPATIENT)
Dept: INTERNAL MEDICINE CLINIC | Facility: CLINIC | Age: 76
End: 2021-05-03

## 2021-05-03 NOTE — TELEPHONE ENCOUNTER
Called pt for clarification. Pt also called requesting orders for hemoglobin a1c. Pt states is she is able to have her hgb a1c checked she will not need the glucometer at this time.      (see separate te)

## 2021-05-03 NOTE — TELEPHONE ENCOUNTER
Pt notified of lab order placed for [address] [place] [address] lab. Pt verbalized understanding.

## 2021-05-03 NOTE — TELEPHONE ENCOUNTER
Pt called to check the status on her request. Pt states that she thinks medicare part b will cover. advd that  still needs to review.      [phone]

## 2021-05-03 NOTE — TELEPHONE ENCOUNTER
Pt called and stated that she lost her vaccination card and wanted her covid vaccine record updated on [employer] so she can print it out for her vacation. Pt advd that she recvd vaccines at [facility].

## 2021-05-03 NOTE — TELEPHONE ENCOUNTER
Pt unsure of vaccine dates. Informed pt that she can attach a picture of her vaccine card via [employer] message when located and then we are able to update her immunization records. Pt verbalized understanding.

## 2021-05-12 ENCOUNTER — TELEPHONE (OUTPATIENT)
Dept: HEMATOLOGY/ONCOLOGY | Facility: HOSPITAL | Age: 76
End: 2021-05-12

## 2021-05-14 ENCOUNTER — TELEPHONE (OUTPATIENT)
Dept: HEMATOLOGY/ONCOLOGY | Facility: HOSPITAL | Age: 76
End: 2021-05-14

## 2021-05-14 NOTE — TELEPHONE ENCOUNTER
Patient said she got a phone call that no one left a message. She thinks it was Dr. Flaco Motta. Please call patient if you did call her.

## 2021-06-07 ENCOUNTER — TELEPHONE (OUTPATIENT)
Dept: INTERNAL MEDICINE CLINIC | Facility: CLINIC | Age: 76
End: 2021-06-07

## 2021-06-07 NOTE — TELEPHONE ENCOUNTER
Inform patient that Ahmed no weights and if she is still having issues with itching may be she wants to look up the [facility] such as [facility] and have the dermatologist or allergist evaluate her  She can always have the dermatologist/allergist who is seeing her here locally give her an referral for [facility] or [address] [place] [address] etc.

## 2021-06-07 NOTE — TELEPHONE ENCOUNTER
Pt wanted to let dr David Anderson know that she has been off of metformin for 6 weeks. Pt also wanted to let him know that her condition has not improved and wants his opinion on what to do next. Offered appointment, pt declined at this time.      [phone]

## 2021-06-07 NOTE — TELEPHONE ENCOUNTER
She can stay off the Metformin since her count is good at 6. See the rheumatologist as she so desires.

## 2021-06-07 NOTE — TELEPHONE ENCOUNTER
Spoke with patient regarding below recommendations. Patient insistent on seeing a rheumatologist at this time for further evaluation. Will consider Andreas/Leah after seeing Dr. Leslie Rowley in Rheumatology. Patient inquiring whether she should begin taking metformin again-- has been off medications for 6 weeks. Last A1C 6.0 on 5/10/2021.

## 2021-06-15 ENCOUNTER — TELEPHONE (OUTPATIENT)
Dept: HEMATOLOGY/ONCOLOGY | Facility: HOSPITAL | Age: 76
End: 2021-06-15

## 2021-06-15 NOTE — TELEPHONE ENCOUNTER
I called Kj and updated her that Dr Rubens Bassett nurse sent me a message from Dr Priscilla Heath. He wants her to continue to take the oral iron daily. Over all her labs have improved and are stable. Dr Priscilla Heath would like to see her for follow up in 3 months.     Kj sanchez

## 2021-06-15 NOTE — TELEPHONE ENCOUNTER
Dr Xiomara De Dios patient - Iron Deficiency Anemia/Puritis/Darkening of Skin    Bimla called to let Dr Xiomara De Dios know she had her labs drawn on 5/10/2021. She wants to know if her anemia is better or should she make an appointment to be seen?     She also wanted to let

## 2021-06-15 NOTE — TELEPHONE ENCOUNTER
Ivette Hernandez at [phone] has weight loss, skin, eyes and neck is getting very dark and she is itching, after covid vaccine itching started. Want to know about her blood count as well.   Dr. Estelle Polanco pt

## 2021-06-16 NOTE — TELEPHONE ENCOUNTER
Spoke with patient. Instructed her to contact her dermatologist in regards to hyperpigmentation. From Dr. Vimal Booker standpoint- she should remain on oral iron.

## 2021-06-21 ENCOUNTER — TELEPHONE (OUTPATIENT)
Dept: INTERNAL MEDICINE CLINIC | Facility: CLINIC | Age: 76
End: 2021-06-21

## 2021-06-21 NOTE — TELEPHONE ENCOUNTER
Attempted to provide patient with Dr. Wynn Alpers contact/location information. Patient states she will check the [facility] page to find someone closer to the [facility] at this time.

## 2021-06-21 NOTE — TELEPHONE ENCOUNTER
Pt is requesting a name for an endocrinologist that dr Stephani Elam would recommend that is near by to her.      Confirmed [phone]

## 2021-06-28 ENCOUNTER — TELEPHONE (OUTPATIENT)
Dept: INTERNAL MEDICINE CLINIC | Facility: CLINIC | Age: 76
End: 2021-06-28

## 2021-06-28 NOTE — TELEPHONE ENCOUNTER
Patient came into the office and filled out medical request form. Patient is requesting this to be completed by the 30th of June.

## 2021-06-29 NOTE — TELEPHONE ENCOUNTER
Record request faxed to Rheumatologist office at [phone] (pt provided fax number) - unable to determine the turn around time when requesting records from another office. Request was sent stat!

## 2021-06-30 NOTE — TELEPHONE ENCOUNTER
Only labs ordered by VM can be faxed - Labs printed back to 2019 and faxed to number provided. If pt is requesting entire medical record - she will have to request it from Medical records directly.

## 2021-06-30 NOTE — TELEPHONE ENCOUNTER
Patient called in advising her appointment is today and they only received one of her labs. Patient is requesting every single lab done in 2021 be sent ASAP.

## 2021-08-17 NOTE — LETTER
[address] [place] [address]   Date:   12/1/2021     Name:   Kulwinder Puri    YOB: 1945   MRN:   [id number]       [facility]?   Dimas the areas on your body where you feel the described sen Chief complaint:   Chief Complaint   Patient presents with   • Diarrhea     room 1   • Vomiting       Vitals:  Visit Vitals  /68   Pulse 79   Temp 99.3 °F (37.4 °C) (Temporal)   Resp 20   Wt 36.8 kg   SpO2 97%       HISTORY OF PRESENT ILLNESS     10 year old female presents with her mother due to symptoms of fever,  (highest 100.2 yesterday), stomach ache, vomiting, and diarrhea. Denies any blood in the stool. Reports she is tolerating PO food or fluids at this time. Denies any known covid exposure, recent travel, freshwater exposure, antibiotic use, and animal exposure. Denies any medical history. Reports immunizations are up to date. The patient's mother states she has similar symptoms and that they both are significantly improved at this time. States her son is at a hospital ([facility]) and need not to have any known sick contacts with covid. Reports today they would like covid testing completed.    Diarrhea   The current episode started yesterday. The onset was sudden. The diarrhea occurs 2 to 4 times per day. The problem has been rapidly improving. The problem is mild. The diarrhea is watery. Nothing relieves the symptoms. Nothing aggravates the symptoms. Associated symptoms include diarrhea, nausea and vomiting. She has been behaving normally. She has been eating and drinking normally. Urine output has been normal. There were sick contacts at home. She has received no recent medical care.   Vomiting  Associated symptoms include nausea and vomiting.       Other significant problems:  There are no problems to display for this patient.      PAST MEDICAL, FAMILY AND SOCIAL HISTORY     Medications:  Current Outpatient Medications   Medication   • cefdinir (OMNICEF) 250 MG/5ML suspension   • amoxicillin (AMOXIL) 400 MG/5ML suspension   • FIBER SELECT GUMMIES PO     No current facility-administered medications for this visit.       Allergies:  ALLERGIES:   Allergen Reactions   • Dog Dander HIVES   • Pollen Other  (See Comments)       Past Medical  History/Surgeries:  History reviewed. No pertinent past medical history.    History reviewed. No pertinent surgical history.    Family History:  History reviewed. No pertinent family history.    Social History:  Social History     Tobacco Use   • Smoking status: Not on file   Substance Use Topics   • Alcohol use: Not on file       REVIEW OF SYSTEMS     Review of Systems   Gastrointestinal: Positive for diarrhea, nausea and vomiting.   All other systems reviewed and are negative.      PHYSICAL EXAM     Physical Exam  Vitals and nursing note reviewed.   Constitutional:       General: She is active. She is not in acute distress.     Appearance: Normal appearance. She is well-developed and normal weight. She is not toxic-appearing.   HENT:      Head: Normocephalic and atraumatic.      Right Ear: Tympanic membrane, ear canal and external ear normal. There is no impacted cerumen. Tympanic membrane is not erythematous or bulging.      Left Ear: Tympanic membrane, ear canal and external ear normal. There is no impacted cerumen. Tympanic membrane is not erythematous or bulging.      Mouth/Throat:      Mouth: Mucous membranes are moist.      Pharynx: Oropharynx is clear. No oropharyngeal exudate or posterior oropharyngeal erythema.   Eyes:      General:         Right eye: No discharge.         Left eye: No discharge.      Conjunctiva/sclera: Conjunctivae normal.   Cardiovascular:      Rate and Rhythm: Normal rate and regular rhythm.      Pulses: Normal pulses.      Heart sounds: Normal heart sounds. No murmur heard.   No friction rub. No gallop.    Pulmonary:      Effort: Pulmonary effort is normal. No respiratory distress, nasal flaring or retractions.      Breath sounds: Normal breath sounds. No stridor or decreased air movement. No wheezing, rhonchi or rales.   Abdominal:      General: Abdomen is flat. There is no distension.      Palpations: Abdomen is soft. There is no mass.       Tenderness: There is no abdominal tenderness. There is no guarding or rebound.      Hernia: No hernia is present.   Musculoskeletal:         General: No swelling, tenderness, deformity or signs of injury. Normal range of motion.      Cervical back: Normal range of motion and neck supple. No rigidity or tenderness.   Lymphadenopathy:      Cervical: No cervical adenopathy.   Skin:     General: Skin is warm and dry.      Capillary Refill: Capillary refill takes less than 2 seconds.      Coloration: Skin is not cyanotic, jaundiced or pale.      Findings: No erythema, petechiae or rash.   Neurological:      General: No focal deficit present.      Mental Status: She is alert and oriented for age.      Gait: Gait normal.   Psychiatric:         Mood and Affect: Mood normal.         Behavior: Behavior normal.         Thought Content: Thought content normal.         Judgment: Judgment normal.       Results for orders placed or performed in visit on 08/17/21   COVID DIAGNOSTIC TEST    Specimen: Nasal, Mid-turbinate; Swab   Result Value    POCT SARS-COV-2 ANTIGEN Not Detected       ASSESSMENT/PLAN   10 year old female presents with her mother due to symptoms of fever,  (highest 100.2 yesterday), stomach ache, vomiting, and diarrhea. The patients symptoms have seemed to mostly have resolved at this time. The patient is alert, non-toxic appearing, and afebrile. Tolerating PO food and fluids. Abdomen soft, non-tender, and non-surgical. Viral gastroenteritis likely present at this time. Covid test negative. There is no evidence of a bacterial infection. Discussed and provided in the discharge paperwork reasons to return to the ER/UC as well as symptomatic treatment measures.     Puja was seen today for diarrhea and vomiting.    Diagnoses and all orders for this visit:    Diarrhea, unspecified type  -     COVID DIAGNOSTIC TEST      All questions answered and patient (parent if applicable) in agreement with treatment and discharge  plan. Patient appropriately stable at time of discharge from urgent care clinic. The provisional diagnosis that the patient is discharged with today was based on the history taken, presenting symptoms, physical exam, and/or any ancillary testing. Patient (parent if applicable) states understanding that often times the diagnosis can change. If new symptoms occur or worsen, patient should seek immediate medical attention for re-evaluation. Follow up with Primary Care Provider as discussed in the after visit summary.    See the patient discharge instructions section for additional instructions, follow-up plans and/or ER precautions discussed with the patient.

## 2021-10-01 ENCOUNTER — TELEPHONE (OUTPATIENT)
Dept: INTERNAL MEDICINE CLINIC | Facility: CLINIC | Age: 76
End: 2021-10-01

## 2021-10-01 NOTE — TELEPHONE ENCOUNTER
Pt called and requested for Dr Diony Sanderson advice     Pt said that she developed a 'skin problem' after her second dose of the Covid Vaccine    Pt would like to know if it is necessary for her to get the Covid booster shot     Please advise

## 2021-10-01 NOTE — TELEPHONE ENCOUNTER
Appt scheduled     Future Appointments   Date Time Provider Taras Espinosai   10/7/2021  3:30 PM Valentino Jacob MD [facility] [place]

## 2021-10-01 NOTE — TELEPHONE ENCOUNTER
Patient states she noticed dark areas over body after second covid vaccine dose 2/2021 --- [employer] . Pt unsure if symptoms related to the vaccine or not but expresses concern and fear of third vaccine at this time.      I provided recommendations for who milena

## 2021-10-04 ENCOUNTER — TELEPHONE (OUTPATIENT)
Dept: INTERNAL MEDICINE CLINIC | Facility: CLINIC | Age: 76
End: 2021-10-04

## 2021-10-04 DIAGNOSIS — E55.9 VITAMIN D DEFICIENCY, UNSPECIFIED: ICD-10-CM

## 2021-10-04 DIAGNOSIS — Z00.00 LABORATORY EXAMINATION ORDERED AS PART OF A COMPLETE PHYSICAL EXAMINATION: ICD-10-CM

## 2021-10-04 DIAGNOSIS — E78.00 PURE HYPERCHOLESTEROLEMIA: ICD-10-CM

## 2021-10-04 DIAGNOSIS — E11.9 TYPE 2 DIABETES MELLITUS WITHOUT COMPLICATION, WITHOUT LONG-TERM CURRENT USE OF INSULIN (HCC): Primary | ICD-10-CM

## 2021-10-04 DIAGNOSIS — Z79.899 OTHER LONG TERM (CURRENT) DRUG THERAPY: ICD-10-CM

## 2021-10-04 DIAGNOSIS — D50.0 IRON DEFICIENCY ANEMIA DUE TO CHRONIC BLOOD LOSS: ICD-10-CM

## 2021-10-04 DIAGNOSIS — M81.0 SENILE OSTEOPOROSIS: ICD-10-CM

## 2021-10-04 NOTE — TELEPHONE ENCOUNTER
Mammogram order previously placed 12/20    Labs pending if appropriate. Would you like to wait until appt to address dexa scan and xray orders?  (Patient also canceled f/u appt for this week -- FYI)    Please advise

## 2021-10-04 NOTE — TELEPHONE ENCOUNTER
Pt requesting the following orders -     Pt scheduled cpx -     Future Appointments   Date Time Provider Taras Yepez   12/16/2021  4:00 PM Forest Price MD [facility] [place]     Bone Density Scan and mammogram.     Pt stated that she has been expe

## 2021-10-04 NOTE — TELEPHONE ENCOUNTER
Patient informed of labs and mammo orders placed. Patient advised to schedule f/u to address back and hip pain or proceed to UC for further eval for worsening pain.      Patient still requesting dexa scan order at this time, last completed 7/12/19    Order

## 2021-10-07 ENCOUNTER — HOSPITAL ENCOUNTER (OUTPATIENT)
Age: 76
Discharge: HOME | End: 2021-10-07
Payer: MEDICARE

## 2021-10-07 ENCOUNTER — APPOINTMENT (OUTPATIENT)
Dept: GENERAL RADIOLOGY | Age: 76
End: 2021-10-07
Attending: NURSE PRACTITIONER
Payer: MEDICARE

## 2021-10-07 VITALS
OXYGEN SATURATION: 97 % | HEIGHT: 62 IN | WEIGHT: 110 LBS | TEMPERATURE: 98 F | SYSTOLIC BLOOD PRESSURE: 149 MMHG | RESPIRATION RATE: 18 BRPM | HEART RATE: 66 BPM | DIASTOLIC BLOOD PRESSURE: 62 MMHG | BODY MASS INDEX: 20.24 KG/M2

## 2021-10-07 DIAGNOSIS — M54.40 BACK PAIN OF LUMBAR REGION WITH SCIATICA: Primary | ICD-10-CM

## 2021-10-07 PROCEDURE — 99213 OFFICE O/P EST LOW 20 MIN: CPT

## 2021-10-07 RX ORDER — METHYLPREDNISOLONE 4 MG/1
TABLET ORAL
Qty: 1 EACH | Refills: 0 | Status: SHIPPED | OUTPATIENT
Start: 2021-10-07 | End: 2021-11-01 | Stop reason: ALTCHOICE

## 2021-10-07 NOTE — ED PROVIDER NOTES
Patient Seen in: [facility] [place]      History   Patient presents with:  Back Pain    Stated Complaint: back pain x 2 weeks    Subjective:   HPI  71-year-old female with history of back pain presents complaining of left-sided lower back pain th other systems reviewed and negative except as noted above.     Physical Exam     ED Triage Vitals [10/07/21 1150]   /62   Pulse 66   Resp 18   Temp 98.1 °F (36.7 °C)   Temp src Temporal   SpO2 97 %   O2 Device None (Room air)       Current:/62 Medication List as of 10/7/2021 12:41 PM    START taking these medications    methylPREDNISolone (MEDROL) 4 MG Oral Tablet Therapy Pack  Dosepack: take as directed, Normal, Disp-1 each, R-0

## 2021-10-07 NOTE — ED INITIAL ASSESSMENT (HPI)
Pt c/o L lower back pain radiating down L leg to foot  x2 weeks, pt denies injury, has had low back pain in the past

## 2021-10-08 ENCOUNTER — TELEPHONE (OUTPATIENT)
Dept: INTERNAL MEDICINE CLINIC | Facility: CLINIC | Age: 76
End: 2021-10-08

## 2021-10-08 NOTE — TELEPHONE ENCOUNTER
Patient called and stated she went to immediate care and was told she has sciatica of her L leg. She stated that she waited for two hours and is requesting an order for an x-ray.  We advised the patient that she will most likely have to have an appointment

## 2021-10-08 NOTE — TELEPHONE ENCOUNTER
Patient will need to be in office with Dr. Alfonso Cline prior to any imaging being ordered.   Please assist patient with scheduling f/u appt, TY

## 2021-10-08 NOTE — TELEPHONE ENCOUNTER
Reached out to patient and she stated she will wait a few days and see if she feels better.      ANAHI

## 2021-10-12 NOTE — TELEPHONE ENCOUNTER
Pt called back requesting the order. Informed pt she needs to schedule an appt. Pt was frustrated because  next available is next week Tuesday. Pt states she will go to the immediate care.

## 2021-10-15 ENCOUNTER — HOSPITAL ENCOUNTER (OUTPATIENT)
Dept: GENERAL RADIOLOGY | Age: 76
Discharge: HOME | End: 2021-10-15
Attending: INTERNAL MEDICINE
Payer: MEDICARE

## 2021-10-15 ENCOUNTER — OFFICE VISIT (OUTPATIENT)
Dept: INTERNAL MEDICINE CLINIC | Facility: CLINIC | Age: 76
End: 2021-10-15
Payer: MEDICARE

## 2021-10-15 VITALS
HEART RATE: 80 BPM | BODY MASS INDEX: 20 KG/M2 | OXYGEN SATURATION: 99 % | TEMPERATURE: 99 F | SYSTOLIC BLOOD PRESSURE: 124 MMHG | WEIGHT: 112 LBS | RESPIRATION RATE: 16 BRPM | DIASTOLIC BLOOD PRESSURE: 70 MMHG

## 2021-10-15 DIAGNOSIS — Z23 IMMUNIZATION DUE: ICD-10-CM

## 2021-10-15 DIAGNOSIS — M54.42 CHRONIC LEFT-SIDED LOW BACK PAIN WITH LEFT-SIDED SCIATICA: ICD-10-CM

## 2021-10-15 DIAGNOSIS — G89.29 CHRONIC LEFT-SIDED LOW BACK PAIN WITH LEFT-SIDED SCIATICA: ICD-10-CM

## 2021-10-15 DIAGNOSIS — G89.29 CHRONIC LEFT-SIDED LOW BACK PAIN WITH LEFT-SIDED SCIATICA: Primary | ICD-10-CM

## 2021-10-15 DIAGNOSIS — M54.42 CHRONIC LEFT-SIDED LOW BACK PAIN WITH LEFT-SIDED SCIATICA: Primary | ICD-10-CM

## 2021-10-15 PROCEDURE — G0008 ADMIN INFLUENZA VIRUS VAC: HCPCS | Performed by: INTERNAL MEDICINE

## 2021-10-15 PROCEDURE — 73502 X-RAY EXAM HIP UNI 2-3 VIEWS: CPT | Performed by: INTERNAL MEDICINE

## 2021-10-15 PROCEDURE — 99213 OFFICE O/P EST LOW 20 MIN: CPT | Performed by: INTERNAL MEDICINE

## 2021-10-15 PROCEDURE — 90662 IIV NO PRSV INCREASED AG IM: CPT | Performed by: INTERNAL MEDICINE

## 2021-10-15 PROCEDURE — 72114 X-RAY EXAM L-S SPINE BENDING: CPT | Performed by: INTERNAL MEDICINE

## 2021-10-15 RX ORDER — GABAPENTIN 100 MG/1
100 CAPSULE ORAL AS DIRECTED
Qty: 30 CAPSULE | Refills: 0 | Status: SHIPPED | OUTPATIENT
Start: 2021-10-15 | End: 2021-12-16

## 2021-10-15 RX ORDER — NAPROXEN 500 MG/1
500 TABLET ORAL 2 TIMES DAILY PRN
Qty: 10 TABLET | Refills: 0 | Status: SHIPPED | OUTPATIENT
Start: 2021-10-15 | End: 2021-10-15 | Stop reason: ALTCHOICE

## 2021-10-15 NOTE — PROGRESS NOTES
Subjective:   Lorna New is a 77year old female who presents for Back Pain     Patient of Dr. Pricila Mcgarry who presents for an acute visit. Back pain - started one morning about 3 weeks ago. No trauma/rashes/swelling/saddle anesthesia/incontinence.   Hist negatives noted in the the HPI. Objective: Wt 112 lb (50.8 kg)   BMI 20.49 kg/m²  Estimated body mass index is 20.49 kg/m² as calculated from the following:    Height as of 10/7/21: 5' 2\" (1.575 m).     Weight as of this encounter: 112 lb (50.8 kg

## 2021-10-20 ENCOUNTER — HOSPITAL ENCOUNTER (OUTPATIENT)
Dept: BONE DENSITY | Age: 76
Discharge: HOME | End: 2021-10-20
Attending: INTERNAL MEDICINE
Payer: MEDICARE

## 2021-10-20 DIAGNOSIS — M81.0 SENILE OSTEOPOROSIS: ICD-10-CM

## 2021-10-20 PROCEDURE — 77080 DXA BONE DENSITY AXIAL: CPT | Performed by: INTERNAL MEDICINE

## 2021-10-22 ENCOUNTER — OFFICE VISIT (OUTPATIENT)
Dept: PHYSICAL THERAPY | Age: 76
End: 2021-10-22
Attending: INTERNAL MEDICINE
Payer: MEDICARE

## 2021-10-22 PROCEDURE — 97162 PT EVAL MOD COMPLEX 30 MIN: CPT

## 2021-10-22 PROCEDURE — 97110 THERAPEUTIC EXERCISES: CPT

## 2021-10-22 PROCEDURE — 97140 MANUAL THERAPY 1/> REGIONS: CPT

## 2021-10-22 NOTE — PROGRESS NOTES
SPINE EVALUATION:   Referring Physician: Dr. Monreal Brothers  Diagnosis: (L) LBP with Radiculopathy Date of Service: 10/22/2021     PATIENT SUMMARY   Harrison Rubi is a 77year old female who presents to therapy today with complaints of LBP for 1 month with no MO anterior pelvic tilt  Neuro Screen: [facility]    Lumbar AROM: (* denotes performed with pain)  Flexion: WFL  Extension: Moderate restriction*  Sidebending: R min restriction; L mod restriction*  Rotation: R mod restriction *; L min restriction    Accessory motio posture and body mechanics to decrease pain and improve spinal safety   · Pt will improve lumbar spine AROM extension to >20 deg to allow increase ease with overhead lifting and reaching  · Pt will report improved symptom centralization and absence of radi

## 2021-10-25 ENCOUNTER — TELEPHONE (OUTPATIENT)
Dept: INTERNAL MEDICINE CLINIC | Facility: CLINIC | Age: 76
End: 2021-10-25

## 2021-10-25 DIAGNOSIS — Z11.1 SCREENING FOR TUBERCULOSIS: Primary | ICD-10-CM

## 2021-10-25 NOTE — TELEPHONE ENCOUNTER
Patient requesting an order for a TB test that is required for work.  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

## 2021-10-25 NOTE — TELEPHONE ENCOUNTER
Patient requesting for the nurse to call her with Dr Mandie Lynne review of her dexa bone scan.      Please advise

## 2021-10-25 NOTE — PROGRESS NOTES
SPINE EVALUATION:   Referring Physician: Dr. Monet Hsu  Diagnosis: (L) lumbar radic Date of Service: 10/25/2021     PATIENT SUMMARY   Aguila Burnette is a 77year old female who presents to therapy today with complaints of (L) lumbar pain radiating to the (L) understanding and performs HEP correctly without reported pain. Skilled Physical Therapy is medically necessary to address the above impairments and reach functional goals.      Precautions:  None  OBJECTIVE:   Observation/Posture: Anterior pelvic tilt, sli and evolving symptoms including changing pain levels.   PLAN OF CARE:    Goals: (to be met in 12 visits)   · Pt will improve transversus abdominis recruitment to perform proper isometric contraction without requiring verbal or tactile cuing to promote advan care.    X___________________________________________________ Date____________________    Certification From: [id number]  To:1/23/2022

## 2021-10-26 ENCOUNTER — TELEPHONE (OUTPATIENT)
Dept: INTERNAL MEDICINE CLINIC | Facility: CLINIC | Age: 76
End: 2021-10-26

## 2021-10-26 NOTE — TELEPHONE ENCOUNTER
Pt called wanting to know what endocrinologist the pt can see that is close to [facility]. She states Maddison Chung wanted her to see an endo? due to her bone density results, she is having arthritis and sciatica pain.  Please advise if VM wanted the pt to se

## 2021-10-26 NOTE — TELEPHONE ENCOUNTER
Informed pt orders are in the system, pt would like to get her tb test done in [place], she states she will call back here to schedule if she is unable to in [facility]

## 2021-10-27 NOTE — TELEPHONE ENCOUNTER
Returned call to patient, re-iterated recommendations for patient to follow-up with [facility]/Dr. Davison at this time. Patient also provided with option to check [facility] page for other specialists in the [facility] area if she chooses.  Patient verbalized understandi

## 2021-10-27 NOTE — TELEPHONE ENCOUNTER
Should be doing PT and follow-up with Dr. Lior Corral. At clinic follow-up can discuss changes in back pain and MRI.

## 2021-10-27 NOTE — TELEPHONE ENCOUNTER
Patient notified of message below from Dr. Monreal Brothers and pt verbalized understanding at this time.      Follow-up appt with Dr. Donal Campo scheduled    Future Appointments   Date Time Provider Taras Yepez   10/28/2021  3:30 PM Antoinette Joya WDR Phys [facility] ED

## 2021-10-28 ENCOUNTER — OFFICE VISIT (OUTPATIENT)
Dept: PHYSICAL THERAPY | Age: 76
End: 2021-10-28
Attending: INTERNAL MEDICINE
Payer: MEDICARE

## 2021-10-28 PROCEDURE — 97140 MANUAL THERAPY 1/> REGIONS: CPT

## 2021-10-28 PROCEDURE — 97110 THERAPEUTIC EXERCISES: CPT

## 2021-10-28 NOTE — PROGRESS NOTES
Dx: (L) lumbar radic        Insurance (Authorized # of Visits): N/A-Medicare            Authorizing Physician: Dr. Jo Walden Next MD visit: none scheduled  Fall Risk: standard         Precautions: n/a             Subjective: Pt reports a 6/10 pain lev PT     Plan: felicita stretch, add squeeze, hip abd iso  Date: 10/28/2021  TX#: 2/12 Date:                 TX#: 3/ Date:                 TX#: 4/ Date:                 TX#: 5/ Date:    Tx#: 6/   Manual Tech:   STM to (L) lumbar paraspinals, QL, piriformis, glu

## 2021-10-29 ENCOUNTER — OFFICE VISIT (OUTPATIENT)
Dept: PHYSICAL THERAPY | Age: 76
End: 2021-10-29
Attending: INTERNAL MEDICINE
Payer: MEDICARE

## 2021-10-29 PROCEDURE — 97140 MANUAL THERAPY 1/> REGIONS: CPT

## 2021-10-29 PROCEDURE — 97110 THERAPEUTIC EXERCISES: CPT

## 2021-10-29 NOTE — PROGRESS NOTES
Dx: (L) lumbar radic        Insurance (Authorized # of Visits): N/A-Medicare            Authorizing Physician: Dr. Jose Casarez Next MD visit: none scheduled  Fall Risk: standard         Precautions: n/a             Subjective: Pt reports a 6/10 pain lev abd iso  Date: 10/28/2021  TX#: 2/12 Date:                 TX#: 3/ Date:                 TX#: 4/ Date:                 TX#: 5/ Date:    Tx#: 6/   Manual Tech:   STM to (L) lumbar paraspinals, QL, piriformis, glute: 10 min  -lumbar traction and (L) lumbar ga

## 2021-11-01 ENCOUNTER — OFFICE VISIT (OUTPATIENT)
Dept: INTERNAL MEDICINE CLINIC | Facility: CLINIC | Age: 76
End: 2021-11-01
Payer: MEDICARE

## 2021-11-01 VITALS
HEART RATE: 82 BPM | OXYGEN SATURATION: 98 % | HEIGHT: 62 IN | SYSTOLIC BLOOD PRESSURE: 144 MMHG | BODY MASS INDEX: 20.36 KG/M2 | DIASTOLIC BLOOD PRESSURE: 76 MMHG | WEIGHT: 110.63 LBS | TEMPERATURE: 98 F | RESPIRATION RATE: 16 BRPM

## 2021-11-01 DIAGNOSIS — M54.50 ACUTE LEFT-SIDED LOW BACK PAIN, UNSPECIFIED WHETHER SCIATICA PRESENT: Primary | ICD-10-CM

## 2021-11-01 DIAGNOSIS — E46 PROTEIN-CALORIE MALNUTRITION, UNSPECIFIED SEVERITY (HCC): ICD-10-CM

## 2021-11-01 PROCEDURE — 99214 OFFICE O/P EST MOD 30 MIN: CPT | Performed by: INTERNAL MEDICINE

## 2021-11-01 NOTE — PROGRESS NOTES
Jalyn Howell  [birth year] is a 77year old female.     Patient presents with:  Back Pain      HPI:   C/o of low back pain f  Did see Dr. Robyn Alba not much success still continues to have significant discomfort  Current Outpatient Medications   Medication Si 6/8/2017   • Ulcerative (chronic) enterocolitis (HCC)    • Ulcerative colitis ([facility]     Dr. Jessie Asencio   • Weight loss       Social History:  Social History    Tobacco Use      Smoking status: Never Smoker      Smokeless tobacco: Never Used    Vaping Use      Va pinprick sensation is normal in the lower extremities. REFLEXES: bilaterally symmetrical, babinski negative. GAIT: normal.   ABDOMEN:   Bowel sounds: normal.   General: normal.   Guarding: absent. Hernia: absent.    Kidneys: normal.   Liver, Spleen: n

## 2021-11-02 ENCOUNTER — APPOINTMENT (OUTPATIENT)
Dept: PHYSICAL THERAPY | Age: 76
End: 2021-11-02
Attending: INTERNAL MEDICINE
Payer: MEDICARE

## 2021-11-04 ENCOUNTER — HOSPITAL ENCOUNTER (OUTPATIENT)
Dept: MRI IMAGING | Age: 76
Discharge: HOME | End: 2021-11-04
Attending: INTERNAL MEDICINE
Payer: MEDICARE

## 2021-11-04 DIAGNOSIS — M54.50 ACUTE LEFT-SIDED LOW BACK PAIN, UNSPECIFIED WHETHER SCIATICA PRESENT: ICD-10-CM

## 2021-11-04 PROCEDURE — 72148 MRI LUMBAR SPINE W/O DYE: CPT | Performed by: INTERNAL MEDICINE

## 2021-11-05 ENCOUNTER — TELEPHONE (OUTPATIENT)
Dept: INTERNAL MEDICINE CLINIC | Facility: CLINIC | Age: 76
End: 2021-11-05

## 2021-11-05 ENCOUNTER — APPOINTMENT (OUTPATIENT)
Dept: PHYSICAL THERAPY | Age: 76
End: 2021-11-05
Attending: INTERNAL MEDICINE
Payer: MEDICARE

## 2021-11-08 ENCOUNTER — OFFICE VISIT (OUTPATIENT)
Dept: PHYSICAL THERAPY | Age: 76
End: 2021-11-08
Attending: PHYSICAL THERAPIST
Payer: MEDICARE

## 2021-11-08 ENCOUNTER — TELEPHONE (OUTPATIENT)
Dept: INTERNAL MEDICINE CLINIC | Facility: CLINIC | Age: 76
End: 2021-11-08

## 2021-11-08 ENCOUNTER — HOSPITAL ENCOUNTER (OUTPATIENT)
Dept: MAMMOGRAPHY | Age: 76
Discharge: HOME | End: 2021-11-08
Attending: INTERNAL MEDICINE
Payer: MEDICARE

## 2021-11-08 ENCOUNTER — LAB ENCOUNTER (OUTPATIENT)
Dept: LAB | Age: 76
End: 2021-11-08
Attending: INTERNAL MEDICINE
Payer: MEDICARE

## 2021-11-08 DIAGNOSIS — Z11.1 SCREENING FOR TUBERCULOSIS: ICD-10-CM

## 2021-11-08 DIAGNOSIS — Z11.1 SCREENING FOR TUBERCULOSIS: Primary | ICD-10-CM

## 2021-11-08 PROCEDURE — 77067 SCR MAMMO BI INCL CAD: CPT | Performed by: INTERNAL MEDICINE

## 2021-11-08 PROCEDURE — 97140 MANUAL THERAPY 1/> REGIONS: CPT | Performed by: PHYSICAL THERAPIST

## 2021-11-08 PROCEDURE — 36415 COLL VENOUS BLD VENIPUNCTURE: CPT

## 2021-11-08 PROCEDURE — 77063 BREAST TOMOSYNTHESIS BI: CPT | Performed by: INTERNAL MEDICINE

## 2021-11-08 PROCEDURE — 86480 TB TEST CELL IMMUN MEASURE: CPT

## 2021-11-08 PROCEDURE — 97110 THERAPEUTIC EXERCISES: CPT | Performed by: PHYSICAL THERAPIST

## 2021-11-08 NOTE — PROGRESS NOTES
Dx: (L) lumbar radic        Insurance (Authorized # of Visits): N/A-Medicare            Authorizing Physician: Dr. Srinath Mckeon MD visit: none scheduled  Fall Risk: standard         Precautions: n/a              Subjective: Pt reports a 4-5/10 PT      Plan: Progress core stability and hip strength per tolerance  Date: 10/28/2021  TX#: 2/12 Date:10/29/21 TX#: 3/12 Date:1/8/21      TX#: 4/12 Date:                 TX#: 5/ Date:    Tx#: 6/   Manual Tech:   STM to (L) lumbar paraspinals, QL, piriformi

## 2021-11-08 NOTE — TELEPHONE ENCOUNTER
Pt requesting if an order for the TB gold test can be ordered instead because she needs this test to go to work urgently and she does not want to come back and have it read.  Or requesting if she can use past Chest x-ray results and  result copy    P

## 2021-11-11 ENCOUNTER — OFFICE VISIT (OUTPATIENT)
Dept: PHYSICAL THERAPY | Age: 76
End: 2021-11-11
Attending: INTERNAL MEDICINE
Payer: MEDICARE

## 2021-11-11 PROCEDURE — 97140 MANUAL THERAPY 1/> REGIONS: CPT | Performed by: PHYSICAL THERAPIST

## 2021-11-11 PROCEDURE — 97110 THERAPEUTIC EXERCISES: CPT | Performed by: PHYSICAL THERAPIST

## 2021-11-11 NOTE — PROGRESS NOTES
Dx: (L) lumbar radic        Insurance (Authorized # of Visits): N/A-Medicare            Authorizing Physician: Dr. Mariajose Goodwin MD visit: none scheduled  Fall Risk: standard         Precautions: n/a              Subjective: Pt reports a 4-5/10 will have decreased paraspinal mm tension to tolerate standing >15 minutes for work and home activities   · Pt will demonstrate improved core strength to be able to perform lifting from the floor with <2/10 pain   · Pt will be independent and compliant wit seconds  -SB hamstring curl: x30  -seated on GSB TA bracing: 10 x10 sec     Hold:  Supine march Clamshells  S/l hip abd  Rows   pallof   There Ex:  -seated HS stretch: 3x30 sec     -sciatic nerve glide: 2x20     -DKTC: 3x30 sec     -SKTC: [id number]     -pir

## 2021-11-16 ENCOUNTER — APPOINTMENT (OUTPATIENT)
Dept: PHYSICAL THERAPY | Age: 76
End: 2021-11-16
Attending: INTERNAL MEDICINE
Payer: MEDICARE

## 2021-11-16 ENCOUNTER — OFFICE VISIT (OUTPATIENT)
Dept: PHYSICAL THERAPY | Age: 76
End: 2021-11-16
Attending: PHYSICAL THERAPIST
Payer: MEDICARE

## 2021-11-16 PROCEDURE — 97110 THERAPEUTIC EXERCISES: CPT | Performed by: PHYSICAL THERAPIST

## 2021-11-16 PROCEDURE — 97140 MANUAL THERAPY 1/> REGIONS: CPT | Performed by: PHYSICAL THERAPIST

## 2021-11-16 NOTE — PROGRESS NOTES
Dx: (L) lumbar radic        Insurance (Authorized # of Visits): N/A-Medicare            Authorizing Physician: Dr. Ryan Ayers MD visit: none scheduled  Fall Risk: standard         Precautions: n/a              Subjective: Pt reports a 3-4/10 tolerance  Date: 10/28/2021  TX#: 2/12 Date:10/29/21 TX#: 3/12 Date:1/8/21      TX#: 4/12 Date: 11/11/21               TX#: 5/12 Date:11/16/21  Tx#: 6/12   Manual Tech:   STM to (L) lumbar paraspinals, QL, piriformis, glute: 10 min  -lumbar traction and (L [id number]     -piriformis stretch: 3x30 sec     -GSB lumbar flexion: 2 directions x10 each     -supine TA bracing: 10x10 seconds  -seated on GSB TA bracing: 10 x10 sec  -Hip abd Iso: 10x10 sec   -supine march: 2x10  -Clams: 3x10  -Standing lumbar flexion (R)

## 2021-11-18 ENCOUNTER — OFFICE VISIT (OUTPATIENT)
Dept: PHYSICAL THERAPY | Age: 76
End: 2021-11-18
Attending: INTERNAL MEDICINE
Payer: MEDICARE

## 2021-11-18 PROCEDURE — 97140 MANUAL THERAPY 1/> REGIONS: CPT | Performed by: PHYSICAL THERAPIST

## 2021-11-18 PROCEDURE — 97110 THERAPEUTIC EXERCISES: CPT | Performed by: PHYSICAL THERAPIST

## 2021-11-18 NOTE — PROGRESS NOTES
Dx: (L) lumbar radic        Insurance (Authorized # of Visits): N/A-Medicare            Authorizing Physician: Dr. Judith Lieberman MD visit: none scheduled  Fall Risk: standard         Precautions: n/a              Subjective: Pt reports a 4-5/10 AROM extension to >20 deg to allow increase ease with reaching overhead activities.   · Pt will report improved symptom centralization and absence of radicular symptoms for 3 consecutive days to improve function with ADL   · Pt will have decreased paraspin sec        Hold:  -prone on table hip ext: 2x10  -posterior pelvic tilt against wall  Rows   pallof   Shoulder extension  bridges There Ex:  -GSB lumbar flexion: 2 directions x10 each     -supine TA bracing: 10x10 seconds  -Hip abd Iso: 10x10 sec   -supine

## 2021-11-22 ENCOUNTER — OFFICE VISIT (OUTPATIENT)
Dept: PHYSICAL THERAPY | Age: 76
End: 2021-11-22
Attending: PHYSICAL THERAPIST
Payer: MEDICARE

## 2021-11-22 PROCEDURE — 97140 MANUAL THERAPY 1/> REGIONS: CPT | Performed by: PHYSICAL THERAPIST

## 2021-11-22 PROCEDURE — 97110 THERAPEUTIC EXERCISES: CPT | Performed by: PHYSICAL THERAPIST

## 2021-11-22 NOTE — PROGRESS NOTES
Dx: (L) lumbar radic        Insurance (Authorized # of Visits): N/A-Medicare            Authorizing Physician: Dr. Koren Frankel MD visit: none scheduled  Fall Risk: standard         Precautions: n/a              Subjective: Pt reports a 4/10 p symptom centralization and absence of radicular symptoms for 3 consecutive days to improve function with ADL   · Pt will have decreased paraspinal mm tension to tolerate standing >15 minutes for work and home activities   · Pt will demonstrate improved cor x10 each  -seated GSB march: 3x10  -seated on GSB TA bracing: 10 x10 sec        Hold:  -prone on table hip ext: 2x10  -posterior pelvic tilt against wall  Rows   pallof   Shoulder extension  bridges There Ex:  -GSB lumbar flexion: 2 directions x10 each

## 2021-11-24 ENCOUNTER — OFFICE VISIT (OUTPATIENT)
Dept: PHYSICAL THERAPY | Age: 76
End: 2021-11-24
Attending: INTERNAL MEDICINE
Payer: MEDICARE

## 2021-11-24 PROCEDURE — 97110 THERAPEUTIC EXERCISES: CPT | Performed by: PHYSICAL THERAPIST

## 2021-11-24 NOTE — PROGRESS NOTES
Dx: (L) lumbar radic        Insurance (Authorized # of Visits): N/A-Medicare            Authorizing Physician: Dr. Meghan Byrnes MD visit: none scheduled  Fall Risk: standard         Precautions: n/a              Subjective: Pt reports a 3/10 p verbal or tactile cuing to promote advancement of therex   · Pt will demonstrate good understanding of proper posture and body mechanics to decrease pain and improve spinal safety   · Pt will improve lumbar spine AROM extension to >20 deg to allow increase march: 3x10  -seated on GSB TA bracing: 10 x10 sec  -seated GSB pelvic circles:x20  -prone on table hip ext: 2x10-Hold  -posterior pelvic tilt against [id number]  Hold:  Rows   pallof   Shoulder extension  bridges  There Ex:  -dead bugs: level 1  -Y

## 2021-12-01 ENCOUNTER — OFFICE VISIT (OUTPATIENT)
Dept: PHYSICAL MEDICINE AND REHAB | Facility: CLINIC | Age: 76
End: 2021-12-01
Payer: MEDICARE

## 2021-12-01 ENCOUNTER — TELEPHONE (OUTPATIENT)
Dept: PHYSICAL MEDICINE AND REHAB | Facility: CLINIC | Age: 76
End: 2021-12-01

## 2021-12-01 VITALS
SYSTOLIC BLOOD PRESSURE: 140 MMHG | HEART RATE: 82 BPM | OXYGEN SATURATION: 97 % | DIASTOLIC BLOOD PRESSURE: 74 MMHG | BODY MASS INDEX: 20.24 KG/M2 | HEIGHT: 62 IN | WEIGHT: 110 LBS

## 2021-12-01 DIAGNOSIS — M48.062 LUMBAR STENOSIS WITH NEUROGENIC CLAUDICATION: ICD-10-CM

## 2021-12-01 DIAGNOSIS — M54.16 LEFT LUMBAR RADICULITIS: Primary | ICD-10-CM

## 2021-12-01 PROCEDURE — 99204 OFFICE O/P NEW MOD 45 MIN: CPT | Performed by: PHYSICAL MEDICINE & REHABILITATION

## 2021-12-01 NOTE — TELEPHONE ENCOUNTER
Per Medicare Guidelines -no authorization is required for amaris     Status: Approved-Covered Benefit     Clinical staff can proceed with scheduling Left L5 transforaminal epidural steroid injection CPT [id number]     [facility]

## 2021-12-01 NOTE — H&P
History and Physical    C/C: low back, left leg pain    HPI: 78 yo f presents with left lower back pain at the waistline and left leg pain radiating down the lateral aspect of the left thigh and lower leg to approximately the ankle without associated numbn paraspinals. No ttp PSIS.   5/5 LE strength b/l in HF, KE, ADF, EHL, ankle eversion  SILT b/l LE  2/4 quad, gastrocs reflexes b/l  Facet loading negative  Seated slump test negative  SLR + on the left; negative right  SI compression test negative  ALEXIS isael

## 2021-12-02 NOTE — TELEPHONE ENCOUNTER
Patient has been scheduled for a Left L5 transforaminal epidural steroid injection under fluoroscopy, IVCS, on 12/6 at the [facility]. Medications and allergies reviewed.  Patient informed to hold aspirins, nsaids, blood thinners, multivitamins, phentermine, vi

## 2021-12-03 ENCOUNTER — OFFICE VISIT (OUTPATIENT)
Dept: FAMILY MEDICINE CLINIC | Facility: CLINIC | Age: 76
End: 2021-12-03
Payer: MEDICARE

## 2021-12-03 VITALS
TEMPERATURE: 98 F | DIASTOLIC BLOOD PRESSURE: 72 MMHG | SYSTOLIC BLOOD PRESSURE: 118 MMHG | WEIGHT: 110.38 LBS | HEART RATE: 86 BPM | BODY MASS INDEX: 20.31 KG/M2 | HEIGHT: 62 IN | OXYGEN SATURATION: 98 % | RESPIRATION RATE: 20 BRPM

## 2021-12-03 DIAGNOSIS — Z20.822 ENCOUNTER FOR SCREENING LABORATORY TESTING FOR COVID-19 VIRUS IN ASYMPTOMATIC PATIENT: Primary | ICD-10-CM

## 2021-12-03 PROCEDURE — 99213 OFFICE O/P EST LOW 20 MIN: CPT | Performed by: PHYSICIAN ASSISTANT

## 2021-12-03 NOTE — PROGRESS NOTES
Patient presents with:  Covid-19 Test: pt is have a procedure on monday      HPI:   Krish Billings is a 77year old female who presents for COVID testing. Patient had no known exposure to COVID.  Required for procedure on Monday 12/6 at 830 am.  No symptom (coronary artery disease)     Dr. July Clarke    • Constipation    • Coronary atherosclerosis of native coronary artery    • Displacement of lumbar intervertebral disc without myelopathy    • DVT (deep venous thrombosis) ([facility])    • Frequent use of laxatives    • moist  THROAT: oral mucosa pink and moist.    NECK: supple, non-tender  LUNGS: non labored breathing,  clear to auscultation bilaterally, no wheezing, rales, or rhonchi.   CARDIO: RRR without murmur  EXTREMITIES: no cyanosis, clubbing or edema    No results

## 2021-12-06 ENCOUNTER — OFFICE VISIT (OUTPATIENT)
Dept: SURGERY | Facility: CLINIC | Age: 76
End: 2021-12-06

## 2021-12-06 DIAGNOSIS — M54.16 LEFT LUMBAR RADICULITIS: Primary | ICD-10-CM

## 2021-12-06 DIAGNOSIS — M48.062 LUMBAR STENOSIS WITH NEUROGENIC CLAUDICATION: ICD-10-CM

## 2021-12-06 PROCEDURE — 64483 NJX AA&/STRD TFRM EPI L/S 1: CPT | Performed by: PHYSICAL MEDICINE & REHABILITATION

## 2021-12-06 PROCEDURE — 99152 MOD SED SAME PHYS/QHP 5/>YRS: CPT | Performed by: PHYSICAL MEDICINE & REHABILITATION

## 2021-12-06 NOTE — PROCEDURES
[facility].    LUMBAR TRANSFORAMINAL   NAME:  Luz Rush    MR #:    [id number] :  10/15/1945     PHYSICIAN:  Mary Jane Petersen DO        Operative Report    DATE OF PROCEDURE: 2021   PREOPERATIVE DIAGNOSES: 1.  Left lumbar rad PAR.  The patient was given discharge instructions and will follow up in the clinic as scheduled. Throughout the whole procedure, the patient's pulse oximetry and vital signs were monitored and they remained completely stable.   Also, throughout the whole

## 2021-12-07 ENCOUNTER — TELEPHONE (OUTPATIENT)
Dept: PHYSICAL MEDICINE AND REHAB | Facility: CLINIC | Age: 76
End: 2021-12-07

## 2021-12-07 NOTE — TELEPHONE ENCOUNTER
pt has a INJ done 12/6. Wants to know what's  the after care form  INJ . please give her a call back. ,[email or web address]

## 2021-12-08 NOTE — TELEPHONE ENCOUNTER
Left L5 TFESI 12/6/21. Patient states she has no relief yet. Advised may take time before pain relief offered. Expressed understanding. Advised may continue with physical activity as prior to injection. Patient has no further questions at this time.    Abbey Bryant

## 2021-12-09 ENCOUNTER — MED REC SCAN ONLY (OUTPATIENT)
Dept: PHYSICAL MEDICINE AND REHAB | Facility: CLINIC | Age: 76
End: 2021-12-09

## 2021-12-13 NOTE — TELEPHONE ENCOUNTER
Pt call an cancel her apt for 12/30 @3:00/  Pt want to talk to a nurse and see is they can get her a sooner apt .   Please give her a call back

## 2021-12-13 NOTE — TELEPHONE ENCOUNTER
Spoke to patient, rescheduled injection follow up for 1/6/22. Patient had already cancelled for  12/30/21. Patient states she has had nor relief yet from left L5 TFESI on 12/6/21.

## 2021-12-16 ENCOUNTER — OFFICE VISIT (OUTPATIENT)
Dept: INTERNAL MEDICINE CLINIC | Facility: CLINIC | Age: 76
End: 2021-12-16
Payer: MEDICARE

## 2021-12-16 VITALS
HEART RATE: 90 BPM | OXYGEN SATURATION: 99 % | DIASTOLIC BLOOD PRESSURE: 86 MMHG | TEMPERATURE: 98 F | HEIGHT: 60.5 IN | BODY MASS INDEX: 20.97 KG/M2 | RESPIRATION RATE: 16 BRPM | SYSTOLIC BLOOD PRESSURE: 124 MMHG | WEIGHT: 109.63 LBS

## 2021-12-16 DIAGNOSIS — M81.0 SENILE OSTEOPOROSIS: ICD-10-CM

## 2021-12-16 DIAGNOSIS — N20.0 CALCULUS OF KIDNEY: ICD-10-CM

## 2021-12-16 DIAGNOSIS — E78.00 PURE HYPERCHOLESTEROLEMIA: ICD-10-CM

## 2021-12-16 DIAGNOSIS — Z00.00 ROUTINE GENERAL MEDICAL EXAMINATION AT A HEALTH CARE FACILITY: Primary | ICD-10-CM

## 2021-12-16 PROBLEM — D50.0 IRON DEFICIENCY ANEMIA DUE TO CHRONIC BLOOD LOSS: Chronic | Status: ACTIVE | Noted: 2021-03-11

## 2021-12-16 PROBLEM — L29.9 PRURITUS: Chronic | Status: ACTIVE | Noted: 2020-12-12

## 2021-12-16 PROBLEM — K91.850 POUCHITIS (HCC): Chronic | Status: ACTIVE | Noted: 2019-07-30

## 2021-12-16 PROBLEM — R63.4 WEIGHT LOSS: Status: RESOLVED | Noted: 2019-07-30 | Resolved: 2021-12-16

## 2021-12-16 PROCEDURE — G0439 PPPS, SUBSEQ VISIT: HCPCS | Performed by: INTERNAL MEDICINE

## 2021-12-16 NOTE — TELEPHONE ENCOUNTER
Noted; see if she wants to come in at [address] next Wednesday if she has not noted any improvement yet.

## 2021-12-16 NOTE — PROGRESS NOTES
Marino Cantor [facility]  is a 77year old female.     Patient presents with:  Wellness Visit: AWV      HPI:   See below    Current Outpatient Medications   Medication Sig Dispense Refill   • Pantoprazole Sodium 40 MG Oral Tab EC Take 1 tablet (40 mg tota usual activities, good exercise tolerance, good general state of health, no fatigue, no fever, no weakness, no weight loss or gain . HEENT/Neck:   Head no headache, no dizziness, no lightheadedness. Eyes normal vision, no redness, no drainage.  Ears no ea Peripheral Vascular:   General no varicosities, no claudication. Dermatologic:   Rash no occ has itching . Nails finger dystrophic -  Neurologic:   Patient denies dizziness, fainting, loss of consciousness, weakness. Memory loss none.  Tingling/numbness extremity joints: normal.-heberden nodes noted  Right ring trigger finger  NEUROLOGICAL:   Babinski: negative/all reflexes are normal.   Cerebellar Testing grossly/intact: yes. Cranial Nerves: CN's II-XII grossly intact.    Gait: normal.   Mental Status:

## 2021-12-17 ENCOUNTER — TELEPHONE (OUTPATIENT)
Dept: INTERNAL MEDICINE CLINIC | Facility: CLINIC | Age: 76
End: 2021-12-17

## 2021-12-17 DIAGNOSIS — Z13.29 SCREENING FOR THYROID DISORDER: ICD-10-CM

## 2021-12-17 DIAGNOSIS — E55.9 VITAMIN D DEFICIENCY, UNSPECIFIED: ICD-10-CM

## 2021-12-17 DIAGNOSIS — Z00.00 LABORATORY EXAMINATION ORDERED AS PART OF A COMPLETE PHYSICAL EXAMINATION: ICD-10-CM

## 2021-12-17 DIAGNOSIS — E11.9 TYPE 2 DIABETES MELLITUS WITHOUT COMPLICATION, WITHOUT LONG-TERM CURRENT USE OF INSULIN (HCC): ICD-10-CM

## 2021-12-17 DIAGNOSIS — E78.00 PURE HYPERCHOLESTEROLEMIA: Primary | ICD-10-CM

## 2021-12-17 DIAGNOSIS — D50.0 IRON DEFICIENCY ANEMIA DUE TO CHRONIC BLOOD LOSS: ICD-10-CM

## 2021-12-17 NOTE — TELEPHONE ENCOUNTER
Would like to get lab orders sent to [address] [place] [address]. She is going tomorrow morning to get labs drawn. Dr. Lane Ortiz sent them to THE [facility].

## 2021-12-20 NOTE — TELEPHONE ENCOUNTER
Dr Muriel Ponce note from 12/16/21 seen now. Slot no longer  available 8 am 12/22/21. Attempted to call patient for condition update. No answer. Message left on voice mail to return call to office.  Will then confirm with Dr Rusty Duarte if would like to double b

## 2021-12-21 ENCOUNTER — OFFICE VISIT (OUTPATIENT)
Dept: INTERNAL MEDICINE CLINIC | Facility: CLINIC | Age: 76
End: 2021-12-21
Payer: MEDICARE

## 2021-12-21 ENCOUNTER — TELEPHONE (OUTPATIENT)
Dept: PHYSICAL MEDICINE AND REHAB | Facility: CLINIC | Age: 76
End: 2021-12-21

## 2021-12-21 VITALS
HEART RATE: 74 BPM | DIASTOLIC BLOOD PRESSURE: 80 MMHG | HEIGHT: 60.5 IN | OXYGEN SATURATION: 100 % | SYSTOLIC BLOOD PRESSURE: 110 MMHG | WEIGHT: 109.81 LBS | RESPIRATION RATE: 16 BRPM | BODY MASS INDEX: 21 KG/M2 | TEMPERATURE: 98 F

## 2021-12-21 DIAGNOSIS — E11.9 TYPE 2 DIABETES MELLITUS WITHOUT COMPLICATION, WITHOUT LONG-TERM CURRENT USE OF INSULIN (HCC): Primary | ICD-10-CM

## 2021-12-21 PROCEDURE — 99213 OFFICE O/P EST LOW 20 MIN: CPT | Performed by: INTERNAL MEDICINE

## 2021-12-21 NOTE — TELEPHONE ENCOUNTER
Patient said that she wanted to give update on how she is feeling, which is not any better. Patient wants to know what can the DR do to help her? Please advise.

## 2021-12-21 NOTE — PROGRESS NOTES
Chelsey Horvath   is a 77year old female. Patient presents with:  Lab Results      HPI:   DIABETES MELLITUS:   The diet that the patient has been following is the American Diabetes Association diet.    The frequency of the monitoring schedule Constipation    • Coronary atherosclerosis of native coronary artery    • Displacement of lumbar intervertebral disc without myelopathy    • DVT (deep venous thrombosis) (HCC)    • Frequent use of laxatives    • Gall stones    • High cholesterol    • Hip f normal  CIRCULATION normal  MONOFILAMENT normal           ASSESSMENT AND PLAN:   Domo Osborn was seen today for lab results.     Diagnoses and all orders for this visit:    Type 2 diabetes mellitus without complication, without long-term current use of insulin (H

## 2021-12-22 ENCOUNTER — TELEPHONE (OUTPATIENT)
Dept: ENDOCRINOLOGY CLINIC | Facility: CLINIC | Age: 76
End: 2021-12-22

## 2021-12-22 NOTE — TELEPHONE ENCOUNTER
S/w pt, reports still no improvement to pain. Pain to Left lower back radiates down to left leg and ankle. Rates 5/10, hard to sleep at night and difficulty w/walking in the AM. Denies N/T or weakness.  Has been doing stretches at home but they provide only

## 2021-12-22 NOTE — TELEPHONE ENCOUNTER
S/w pt, offered appt tomorrow 12/23 but pt refused since \"I am too busy tomorrow. \" Requested appt in [place] instead. Offered 12/29 at 11:30. Appt scheduled.

## 2021-12-22 NOTE — TELEPHONE ENCOUNTER
Diabetes Education: called pt to schedule with dietitian for MNT, pt states \"No, I don't need that, please cancel it\".

## 2021-12-29 ENCOUNTER — TELEPHONE (OUTPATIENT)
Dept: NEUROLOGY | Facility: CLINIC | Age: 76
End: 2021-12-29

## 2021-12-29 ENCOUNTER — OFFICE VISIT (OUTPATIENT)
Dept: PHYSICAL MEDICINE AND REHAB | Facility: CLINIC | Age: 76
End: 2021-12-29
Payer: MEDICARE

## 2021-12-29 VITALS — HEIGHT: 60.5 IN | WEIGHT: 109.81 LBS | BODY MASS INDEX: 21 KG/M2

## 2021-12-29 DIAGNOSIS — M54.16 LEFT LUMBAR RADICULITIS: Primary | ICD-10-CM

## 2021-12-29 PROCEDURE — 99215 OFFICE O/P EST HI 40 MIN: CPT | Performed by: PHYSICAL MEDICINE & REHABILITATION

## 2021-12-29 RX ORDER — GABAPENTIN 300 MG/1
CAPSULE ORAL
Qty: 60 CAPSULE | Refills: 0 | Status: SHIPPED | OUTPATIENT
Start: 2021-12-29

## 2021-12-29 RX ORDER — NAPROXEN 250 MG/1
250 TABLET ORAL 2 TIMES DAILY WITH MEALS
COMMUNITY

## 2021-12-29 NOTE — TELEPHONE ENCOUNTER
Per Medicare guidelines authorization is not required for L5-S1 interlaminar epidural steroid injection under fluoroscopy cpt codes 30740, 80565. Will inform Nursing.

## 2021-12-30 NOTE — TELEPHONE ENCOUNTER
Spoke to patient state she want to try the medication Gabapentin Dr. Ronda Tabor prescribed first. That she will call the office in 3 weeks with an update how it is working and if she is not better she will then co head with the injection.  As of today we are g

## 2021-12-31 NOTE — PROGRESS NOTES
Progress note    C/C: low back, left leg pain    HPI: 52-year-old female presents for follow-up. She underwent left L5 transforaminal epidural steroid injection under fluoroscopy, IV conscious sedation, on 12/6/2021.   Unfortunately no relief following pro avoided, and so should oral corticosteroids where possible. At the patient's request I did communicate the plan with her son, who is an anesthesiologist at the [facility]. Follow up for injection.     Documentation was created using Dragon

## 2022-01-05 ENCOUNTER — TELEPHONE (OUTPATIENT)
Dept: INTERNAL MEDICINE CLINIC | Facility: CLINIC | Age: 77
End: 2022-01-05

## 2022-01-06 ENCOUNTER — TELEPHONE (OUTPATIENT)
Dept: INTERNAL MEDICINE CLINIC | Facility: CLINIC | Age: 77
End: 2022-01-06

## 2022-01-06 RX ORDER — GLIPIZIDE 2.5 MG/1
2.5 TABLET, EXTENDED RELEASE ORAL
Qty: 30 TABLET | Refills: 2 | Status: SHIPPED | OUTPATIENT
Start: 2022-01-06 | End: 2022-02-05

## 2022-01-06 NOTE — TELEPHONE ENCOUNTER
Patient has been scheduled for L5-S1 interlaminar epidural steroid injection under fluoroscopy on 1/21/22 at the [facility] with Dr. Geneva Shen. -Anesthesia type: IVCS.   -If receiving MAC or IVC sedation patient will need to get COVID tested 3 days prior even if a

## 2022-01-13 ENCOUNTER — TELEPHONE (OUTPATIENT)
Dept: PHYSICAL MEDICINE AND REHAB | Facility: CLINIC | Age: 77
End: 2022-01-13

## 2022-01-13 DIAGNOSIS — M54.16 LEFT LUMBAR RADICULITIS: Primary | ICD-10-CM

## 2022-01-13 DIAGNOSIS — M48.062 LUMBAR STENOSIS WITH NEUROGENIC CLAUDICATION: ICD-10-CM

## 2022-01-13 NOTE — TELEPHONE ENCOUNTER
Ana Rodriguez from central scheduling called to ask for a order for preprocedure (covid testing) for the patient. Fax it to [phone]. Ana Rodriguez is asking the patient gets called once the order is available.

## 2022-01-18 ENCOUNTER — TELEPHONE (OUTPATIENT)
Dept: NEUROLOGY | Facility: CLINIC | Age: 77
End: 2022-01-18

## 2022-01-18 ENCOUNTER — LAB ENCOUNTER (OUTPATIENT)
Dept: LAB | Age: 77
End: 2022-01-18
Attending: PHYSICAL MEDICINE & REHABILITATION
Payer: MEDICARE

## 2022-01-18 DIAGNOSIS — M48.062 LUMBAR STENOSIS WITH NEUROGENIC CLAUDICATION: ICD-10-CM

## 2022-01-18 DIAGNOSIS — M54.16 LEFT LUMBAR RADICULITIS: ICD-10-CM

## 2022-01-19 LAB — SARS-COV-2 RNA RESP QL NAA+PROBE: NOT DETECTED

## 2022-01-21 ENCOUNTER — TELEPHONE (OUTPATIENT)
Dept: PHYSICAL MEDICINE AND REHAB | Facility: CLINIC | Age: 77
End: 2022-01-21

## 2022-01-21 ENCOUNTER — OFFICE VISIT (OUTPATIENT)
Dept: SURGERY | Facility: CLINIC | Age: 77
End: 2022-01-21

## 2022-01-21 ENCOUNTER — MED REC SCAN ONLY (OUTPATIENT)
Dept: PHYSICAL MEDICINE AND REHAB | Facility: CLINIC | Age: 77
End: 2022-01-21

## 2022-01-21 DIAGNOSIS — M54.16 LEFT LUMBAR RADICULITIS: Primary | ICD-10-CM

## 2022-01-21 DIAGNOSIS — M48.062 LUMBAR STENOSIS WITH NEUROGENIC CLAUDICATION: ICD-10-CM

## 2022-01-21 PROCEDURE — 62323 NJX INTERLAMINAR LMBR/SAC: CPT | Performed by: PHYSICAL MEDICINE & REHABILITATION

## 2022-01-21 PROCEDURE — 99152 MOD SED SAME PHYS/QHP 5/>YRS: CPT | Performed by: PHYSICAL MEDICINE & REHABILITATION

## 2022-01-22 NOTE — PROCEDURES
Pro[facility] Note - [facility]     Informed Consent Obtained by:  Kendall Peterson DO  Procedure performed by: Kendall Peterson DO    Procedure: LUMBAR EPIDURAL STEROID INJECTION UNDER FLUOROSCOPY OF L5-S1    Pre-operative Diagnosis: Left lumbar radiculitis stable condition in the care of family member/friend. 3) Patient may continue taking his/her medication as prescribed by the patient’s physician.   4) F/U in 2-3 weeks for post-procedure evaluation    Shaylee Byers DO  Physical Medicine and Rehabilitation

## 2022-02-08 ENCOUNTER — TELEPHONE (OUTPATIENT)
Dept: PHYSICAL MEDICINE AND REHAB | Facility: CLINIC | Age: 77
End: 2022-02-08

## 2022-02-08 NOTE — TELEPHONE ENCOUNTER
Pt had  a inj for 01/21 . Patient feels fine but still with some discomfort . Pt did made an apt for 2/23 to discus with the

## 2022-02-09 NOTE — TELEPHONE ENCOUNTER
Patient had called this office with condition update following L5 TFEIS on 1/21/22. Earlier message was left for patient to call this office back by Kerry Blackman is already made for 2/23/22. I left message for patient now she does not need to call office back unless she has any questions.

## 2022-02-11 ENCOUNTER — OFFICE VISIT (OUTPATIENT)
Dept: PHYSICAL MEDICINE AND REHAB | Facility: CLINIC | Age: 77
End: 2022-02-11
Payer: MEDICARE

## 2022-02-11 VITALS
BODY MASS INDEX: 20.85 KG/M2 | OXYGEN SATURATION: 98 % | HEART RATE: 70 BPM | HEIGHT: 60.5 IN | DIASTOLIC BLOOD PRESSURE: 62 MMHG | SYSTOLIC BLOOD PRESSURE: 138 MMHG | WEIGHT: 109 LBS

## 2022-02-11 DIAGNOSIS — M54.16 LEFT LUMBAR RADICULITIS: Primary | ICD-10-CM

## 2022-02-11 DIAGNOSIS — M48.062 LUMBAR STENOSIS WITH NEUROGENIC CLAUDICATION: ICD-10-CM

## 2022-02-11 PROCEDURE — 99213 OFFICE O/P EST LOW 20 MIN: CPT | Performed by: PHYSICAL MEDICINE & REHABILITATION

## 2022-02-11 RX ORDER — PREGABALIN 75 MG/1
75 CAPSULE ORAL NIGHTLY
Qty: 30 CAPSULE | Refills: 0 | Status: SHIPPED | OUTPATIENT
Start: 2022-02-11

## 2022-02-11 NOTE — PROGRESS NOTES
Progress note    C/C: left buttock and leg pain    HPI: 50% improvement following L5-S1 interlaminar epidural steroid injection under fluoroscopy, IV conscious sedation, on 1/21/2022. Takes gabapentin at night time if she does not have work to do in the morning. It does cause dizziness in the morning. Doesn't seem to help with the pain or sleep. During the daytime she can have quite a bit of lateral shin pain, and occasional pain in the plantar aspect of the foot. By far and away she tends to be more painful along the lateral shin, that shoots down the lateral leg from the back. Standing and walking are still painful, though pain is significantly less and she is able to walk much better than before this last injection. She continues to walk once a day. Pertinent allergies: NKDA    Physical exam:  BMI 20.94. Blood pressure 138/62. Pulse 70. O2 sat 98%    Lumbar spine exam:    No pain with lumbar flexion. + pain with lumbar extension. 5/5 LE strength b/l  SILT b/l LE  2/4 quad, gastrocs reflexes b/l  Facet loading negative  Seated slump test negative  SLR negative bilaterally    Imaging: No new imaging to review    Assessment and plan  1. Left lumbar radiculopathy  2. Lumbar stenosis  3. H/o osteoporosis  4. H/o ulcerative colitis    Recommend stopping the gabapentin, start Lyrica 75 mg nightly. Consider repeating L5-S1 interlaminar epidural steroid injection if his symptoms remain bothersome. She will discuss with her endocrinologist if it is safe to undergo repeat epidural steroid injection, should she need one.      She will let me know how she is doing in 1-2 weeks, sometime after she has seen her endocrinologist.    Perla Berger and [address] [place] [address]

## 2022-02-11 NOTE — PATIENT INSTRUCTIONS
Start the lyrica on a night when you have no assignments in the morning. See the endocrinologist before we consider repeating epidural steroid injection.

## 2022-02-18 ENCOUNTER — MED REC SCAN ONLY (OUTPATIENT)
Dept: PHYSICAL MEDICINE AND REHAB | Facility: CLINIC | Age: 77
End: 2022-02-18

## 2022-02-21 PROBLEM — M81.8 OTHER OSTEOPOROSIS WITHOUT CURRENT PATHOLOGICAL FRACTURE: Status: ACTIVE | Noted: 2022-02-21

## 2022-03-08 ENCOUNTER — TELEPHONE (OUTPATIENT)
Dept: PHYSICAL MEDICINE AND REHAB | Facility: CLINIC | Age: 77
End: 2022-03-08

## 2022-03-08 NOTE — TELEPHONE ENCOUNTER
Pt interested in repeating epidural steroid injection since pain not improved much. Reports pain to Left lower back radiates down left leg to ankle. Rates 5/10 during the day and worse at night, rates 7/10. Difficulty with sleeping. Takes Lyrica 75 mg and is providing better relief than Gabapentin did. Plan at [address] [place] [address] on 2/11/22:  Consider repeating L5-S1 interlaminar epidural steroid injection if his symptoms remain bothersome. She will discuss with her endocrinologist if it is safe to undergo repeat epidural steroid injection, should she need one. Pt met with Dr. Yecenia Edwards on 2/21/22 who is now recommending starting Prolia injections for osteoporosis. Pt did not obtain clearance from Dr. Yecenia Edwards if epidural steroid injections are approved. Pt is also concerned about her BG levels being affected again from steroid injections since she reports her A1C increased after last injection. Pt would like to know what Dr. Yaya Elias and Dr. Yecenia Edwards both recommend. Or is an Orthopedic consult necessary instead of repeating steroid injection?

## 2022-03-14 NOTE — TELEPHONE ENCOUNTER
Recommend repeating L5-S1 interlaminar epidural steroid injection; should discuss with her PCP or endocrinologist if there is something that can be done to manage any transient increases in her blood glucose. Never smoker

## 2022-03-16 NOTE — TELEPHONE ENCOUNTER
S/w pt to share Dr. Tobin Devoid recommendations. Pt asking if she should do physical therapy prior to repeat injections? Even though pain is still present, it is not as intense as before she had the first injections. In the past, pt completed about 8 sessions of PT and never did PT after her 2nd injection.

## 2022-03-18 LAB
ALBUMIN/GLOBULIN RATIO: 1.1 (CALC) (ref 1–2.5)
ALBUMIN: 3.5 G/DL (ref 3.6–5.1)
ALKALINE PHOSPHATASE: 80 U/L (ref 37–153)
ALT: 16 U/L (ref 6–29)
AST: 21 U/L (ref 10–35)
BILIRUBIN, TOTAL: 0.9 MG/DL (ref 0.2–1.2)
BUN: 10 MG/DL (ref 7–25)
CALCIUM: 9 MG/DL (ref 8.6–10.4)
CARBON DIOXIDE: 25 MMOL/L (ref 20–32)
CHLORIDE: 102 MMOL/L (ref 98–110)
CHOL/HDLC RATIO: 2.1 (CALC)
CHOLESTEROL, TOTAL: 116 MG/DL
CREATININE: 0.77 MG/DL (ref 0.6–0.93)
EGFR IF AFRICN AM: 88 ML/MIN/1.73M2
GLOBULIN: 3.3 G/DL (CALC) (ref 1.9–3.7)
GLUCOSE: 199 MG/DL (ref 65–99)
HDL CHOLESTEROL: 55 MG/DL
HEMOGLOBIN A1C: 7.9 % OF TOTAL HGB
LDL-CHOLESTEROL: 37 MG/DL (CALC)
NON-HDL CHOLESTEROL: 61 MG/DL (CALC)
POTASSIUM: 5 MMOL/L (ref 3.5–5.3)
PROTEIN, TOTAL: 6.8 G/DL (ref 6.1–8.1)
SODIUM: 136 MMOL/L (ref 135–146)
TRIGLYCERIDES: 160 MG/DL

## 2022-03-21 ENCOUNTER — TELEPHONE (OUTPATIENT)
Dept: INTERNAL MEDICINE CLINIC | Facility: CLINIC | Age: 77
End: 2022-03-21

## 2022-03-21 NOTE — TELEPHONE ENCOUNTER
S/w pt to notify her of approval of physical therapy order. Pt already scheduled physical therapy. Pt also considering ortho consult. Will keep us updated if appt with Dr. Breanne Dennis needed.

## 2022-03-21 NOTE — TELEPHONE ENCOUNTER
Pt requesting a recommendation to an orthopedic specialist for her back problems. Pt has a referral to PT but would like to see an ortho specialist as well. Please advise with recommendation.

## 2022-03-23 NOTE — TELEPHONE ENCOUNTER
See result note. Dr Giacomo Oppenheim is requesting pt schedule appointment to discuss lab results. Please contact pt and schedule in person or telephone visit.

## 2022-03-23 NOTE — TELEPHONE ENCOUNTER
Future Appointments   Date Time Provider Taras Yepez   4/4/2022  1:45 PM Randell Peoples MD [facility] [place]

## 2022-03-28 ENCOUNTER — OFFICE VISIT (OUTPATIENT)
Dept: PHYSICAL THERAPY | Age: 77
End: 2022-03-28
Attending: PHYSICAL MEDICINE & REHABILITATION
Payer: MEDICARE

## 2022-03-28 DIAGNOSIS — M54.16 LEFT LUMBAR RADICULITIS: ICD-10-CM

## 2022-03-28 PROCEDURE — 97110 THERAPEUTIC EXERCISES: CPT | Performed by: PHYSICAL THERAPIST

## 2022-03-28 PROCEDURE — 97161 PT EVAL LOW COMPLEX 20 MIN: CPT | Performed by: PHYSICAL THERAPIST

## 2022-03-30 ENCOUNTER — OFFICE VISIT (OUTPATIENT)
Dept: PHYSICAL THERAPY | Age: 77
End: 2022-03-30
Attending: PHYSICAL MEDICINE & REHABILITATION
Payer: MEDICARE

## 2022-03-30 PROCEDURE — 97110 THERAPEUTIC EXERCISES: CPT | Performed by: PHYSICAL THERAPIST

## 2022-03-30 PROCEDURE — 97140 MANUAL THERAPY 1/> REGIONS: CPT | Performed by: PHYSICAL THERAPIST

## 2022-04-04 ENCOUNTER — VIRTUAL PHONE E/M (OUTPATIENT)
Dept: INTERNAL MEDICINE CLINIC | Facility: CLINIC | Age: 77
End: 2022-04-04
Payer: MEDICARE

## 2022-04-04 DIAGNOSIS — E11.9 TYPE 2 DIABETES MELLITUS WITHOUT COMPLICATION, WITHOUT LONG-TERM CURRENT USE OF INSULIN (HCC): Primary | ICD-10-CM

## 2022-04-04 PROCEDURE — 99442 PHONE E/M BY PHYS 11-20 MIN: CPT | Performed by: INTERNAL MEDICINE

## 2022-04-04 NOTE — PROGRESS NOTES
Virtual Telephone Check-In    Brooke Tillman verbally consents to a Virtual/Telephone Check-In visit on 04/04/22. Patient has been referred to the [facility] website at [email or web address] to review the yearly Consent to Treat document. Patient understands and accepts financial responsibility for any deductible, co-insurance and/or co-pays associated with this service. Duration of the service: 20 minutes      Summary of topics discussed: Patient's appointment was predominantly for diabetes apparently she has taken 2 shots of steroids for her low back pain without much relief and she attributes the elevation of her sugar to these injections. She also states that her itching has resumed which she now thinks is possibly related to glipizide. Patient was advised to stop the glipizide and monitor her sugar numbers. Second to this the patient starts talking about her low back pain and not having much relief. Patient advised that there were time constraints and we had to address the main issue which was the diabetes. Meantime she will go ahead and see the spine MD she was given the telephone number for Dr. Mateo Hutchinson. Patient is hesitant to start any diabetic medicines wants to try conservative therapy.   She will repeat her hemoglobin A1c in June Diagnoses and all orders for this visit:    Type 2 diabetes mellitus without complication, without long-term current use of insulin (HCC)  -     HEMOGLOBIN A1C; Future          Heath Paulson MD

## 2022-04-05 ENCOUNTER — TELEPHONE (OUTPATIENT)
Dept: INTERNAL MEDICINE CLINIC | Facility: CLINIC | Age: 77
End: 2022-04-05

## 2022-04-05 ENCOUNTER — APPOINTMENT (OUTPATIENT)
Dept: PHYSICAL THERAPY | Age: 77
End: 2022-04-05
Attending: PHYSICAL MEDICINE & REHABILITATION
Payer: MEDICARE

## 2022-04-05 NOTE — TELEPHONE ENCOUNTER
Pt called wanting to make sure she will be seeing the best fit doctor her her back pain with sciatica. Pt was given a referral for Geovanna Berrios and pt would like to make sure from Dilan Rowley that, that specialist is the best doctor for her to see.  Please advise

## 2022-04-05 NOTE — TELEPHONE ENCOUNTER
Pt informed Dr Ai Vera placed a referral for Dr Candace Jackson. Requesting contact information via [employer].

## 2022-04-08 ENCOUNTER — APPOINTMENT (OUTPATIENT)
Dept: PHYSICAL THERAPY | Age: 77
End: 2022-04-08
Attending: PHYSICAL MEDICINE & REHABILITATION
Payer: MEDICARE

## 2022-04-11 ENCOUNTER — APPOINTMENT (OUTPATIENT)
Dept: PHYSICAL THERAPY | Age: 77
End: 2022-04-11
Attending: PHYSICAL MEDICINE & REHABILITATION
Payer: MEDICARE

## 2022-04-13 ENCOUNTER — APPOINTMENT (OUTPATIENT)
Dept: PHYSICAL THERAPY | Age: 77
End: 2022-04-13
Attending: PHYSICAL MEDICINE & REHABILITATION
Payer: MEDICARE

## 2022-04-18 ENCOUNTER — APPOINTMENT (OUTPATIENT)
Dept: PHYSICAL THERAPY | Age: 77
End: 2022-04-18
Attending: PHYSICAL MEDICINE & REHABILITATION
Payer: MEDICARE

## 2022-05-02 ENCOUNTER — APPOINTMENT (OUTPATIENT)
Dept: PHYSICAL THERAPY | Age: 77
End: 2022-05-02
Attending: PHYSICAL MEDICINE & REHABILITATION
Payer: MEDICARE

## 2022-05-19 DIAGNOSIS — E11.9 TYPE 2 DIABETES MELLITUS WITHOUT COMPLICATION, WITHOUT LONG-TERM CURRENT USE OF INSULIN (HCC): Primary | ICD-10-CM

## 2022-05-19 RX ORDER — BLOOD-GLUCOSE METER
1 KIT MISCELLANEOUS AS NEEDED
Refills: 0 | Status: CANCELLED | OUTPATIENT
Start: 2022-05-19

## 2022-05-26 RX ORDER — BLOOD-GLUCOSE METER
KIT MISCELLANEOUS
Qty: 100 STRIP | Refills: 3 | Status: SHIPPED | OUTPATIENT
Start: 2022-05-26

## 2022-05-26 RX ORDER — BLOOD-GLUCOSE METER
KIT MISCELLANEOUS
Qty: 1 KIT | Refills: 0 | Status: SHIPPED | OUTPATIENT
Start: 2022-05-26

## 2022-05-26 RX ORDER — LANCETS 28 GAUGE
1 EACH MISCELLANEOUS DAILY
Qty: 100 EACH | Refills: 3 | Status: SHIPPED | OUTPATIENT
Start: 2022-05-26

## 2022-07-05 ENCOUNTER — APPOINTMENT (OUTPATIENT)
Dept: URBAN - METROPOLITAN AREA CLINIC 242 | Age: 77
Setting detail: DERMATOLOGY
End: 2022-07-05

## 2022-07-05 DIAGNOSIS — L20.89 OTHER ATOPIC DERMATITIS: ICD-10-CM

## 2022-07-05 PROBLEM — L20.84 INTRINSIC (ALLERGIC) ECZEMA: Status: ACTIVE | Noted: 2022-07-05

## 2022-07-05 PROCEDURE — OTHER DIAGNOSIS COMMENT: OTHER

## 2022-07-05 PROCEDURE — OTHER PRESCRIPTION MEDICATION MANAGEMENT: OTHER

## 2022-07-05 PROCEDURE — OTHER COUNSELING: OTHER

## 2022-07-05 PROCEDURE — 99213 OFFICE O/P EST LOW 20 MIN: CPT

## 2022-07-05 ASSESSMENT — LOCATION ZONE DERM
LOCATION ZONE: TRUNK
LOCATION ZONE: NECK
LOCATION ZONE: ARM
LOCATION ZONE: EYELID

## 2022-07-05 ASSESSMENT — LOCATION SIMPLE DESCRIPTION DERM
LOCATION SIMPLE: RIGHT INFERIOR EYELID
LOCATION SIMPLE: LEFT SUPERIOR EYELID
LOCATION SIMPLE: RIGHT UPPER ARM
LOCATION SIMPLE: RIGHT FOREARM
LOCATION SIMPLE: LEFT FOREARM
LOCATION SIMPLE: LEFT INFERIOR EYELID
LOCATION SIMPLE: RIGHT ANTERIOR NECK
LOCATION SIMPLE: POSTERIOR NECK
LOCATION SIMPLE: LEFT UPPER ARM
LOCATION SIMPLE: CHEST
LOCATION SIMPLE: RIGHT SUPERIOR EYELID

## 2022-07-05 ASSESSMENT — LOCATION DETAILED DESCRIPTION DERM
LOCATION DETAILED: RIGHT MEDIAL SUPERIOR EYELID
LOCATION DETAILED: RIGHT VENTRAL PROXIMAL FOREARM
LOCATION DETAILED: LEFT INFERIOR POSTERIOR NECK
LOCATION DETAILED: RIGHT ANTERIOR PROXIMAL UPPER ARM
LOCATION DETAILED: LEFT LATERAL SUPERIOR EYELID
LOCATION DETAILED: LEFT VENTRAL PROXIMAL FOREARM
LOCATION DETAILED: LEFT LATERAL INFERIOR PRESEPTAL REGION
LOCATION DETAILED: LEFT LATERAL SUPERIOR CHEST
LOCATION DETAILED: RIGHT INFERIOR ANTERIOR NECK
LOCATION DETAILED: RIGHT LATERAL INFERIOR EYELID
LOCATION DETAILED: RIGHT LATERAL SUPERIOR CHEST
LOCATION DETAILED: LEFT ANTERIOR PROXIMAL UPPER ARM

## 2022-07-05 NOTE — PROCEDURE: PRESCRIPTION MEDICATION MANAGEMENT
Initiate Treatment: Triamcinolone cream 0.1% BID for 2 weeks pt has at home
Render In Strict Bullet Format?: No
Samples Given: Of moisturizers given today
Detail Level: Zone

## 2022-07-05 NOTE — HPI: RASH
What Type Of Note Output Would You Prefer (Optional)?: Bullet Format
How Severe Is Your Rash?: moderate
Is This A New Presentation, Or A Follow-Up?: Rash
Additional History: Ocyflox 0.3% alcori 2.5%

## 2022-07-18 ENCOUNTER — PATIENT MESSAGE (OUTPATIENT)
Dept: INTERNAL MEDICINE CLINIC | Facility: CLINIC | Age: 77
End: 2022-07-18

## 2022-07-19 ENCOUNTER — HOSPITAL ENCOUNTER (OUTPATIENT)
Dept: LAB | Facility: HOSPITAL | Age: 77
Discharge: HOME | End: 2022-07-19
Attending: INTERNAL MEDICINE
Payer: MEDICARE

## 2022-07-19 DIAGNOSIS — E11.9 TYPE 2 DIABETES MELLITUS WITHOUT COMPLICATION, WITHOUT LONG-TERM CURRENT USE OF INSULIN (HCC): ICD-10-CM

## 2022-07-19 DIAGNOSIS — Z00.00 LABORATORY EXAMINATION ORDERED AS PART OF A COMPLETE PHYSICAL EXAMINATION: ICD-10-CM

## 2022-07-19 DIAGNOSIS — D50.0 IRON DEFICIENCY ANEMIA DUE TO CHRONIC BLOOD LOSS: ICD-10-CM

## 2022-07-19 LAB
BASOPHILS # BLD AUTO: 0.06 X10(3) UL (ref 0–0.2)
BASOPHILS NFR BLD AUTO: 1.4 %
EOSINOPHIL # BLD AUTO: 0.39 X10(3) UL (ref 0–0.7)
EOSINOPHIL NFR BLD AUTO: 9 %
ERYTHROCYTE [DISTWIDTH] IN BLOOD BY AUTOMATED COUNT: 14.5 %
EST. AVERAGE GLUCOSE BLD GHB EST-MCNC: 174 MG/DL (ref 68–126)
HBA1C MFR BLD: 7.7 % (ref ?–5.7)
HCT VFR BLD AUTO: 39.6 %
HGB BLD-MCNC: 12.2 G/DL
IMM GRANULOCYTES # BLD AUTO: 0.01 X10(3) UL (ref 0–1)
IMM GRANULOCYTES NFR BLD: 0.2 %
LYMPHOCYTES # BLD AUTO: 1.31 X10(3) UL (ref 1–4)
LYMPHOCYTES NFR BLD AUTO: 30.4 %
MCH RBC QN AUTO: 27.3 PG (ref 26–34)
MCHC RBC AUTO-ENTMCNC: 30.8 G/DL (ref 31–37)
MCV RBC AUTO: 88.6 FL
MONOCYTES # BLD AUTO: 0.25 X10(3) UL (ref 0.1–1)
MONOCYTES NFR BLD AUTO: 5.8 %
NEUTROPHILS # BLD AUTO: 2.29 X10 (3) UL (ref 1.5–7.7)
NEUTROPHILS # BLD AUTO: 2.29 X10(3) UL (ref 1.5–7.7)
NEUTROPHILS NFR BLD AUTO: 53.2 %
PLATELET # BLD AUTO: 301 10(3)UL (ref 150–450)
RBC # BLD AUTO: 4.47 X10(6)UL
WBC # BLD AUTO: 4.3 X10(3) UL (ref 4–11)

## 2022-07-19 PROCEDURE — 85025 COMPLETE CBC W/AUTO DIFF WBC: CPT

## 2022-07-19 PROCEDURE — 83036 HEMOGLOBIN GLYCOSYLATED A1C: CPT

## 2022-07-19 PROCEDURE — 36415 COLL VENOUS BLD VENIPUNCTURE: CPT

## 2022-08-22 ENCOUNTER — PATIENT MESSAGE (OUTPATIENT)
Dept: INTERNAL MEDICINE CLINIC | Facility: CLINIC | Age: 77
End: 2022-08-22

## 2022-08-22 ENCOUNTER — TELEPHONE (OUTPATIENT)
Dept: INTERNAL MEDICINE CLINIC | Facility: CLINIC | Age: 77
End: 2022-08-22

## 2022-08-22 NOTE — TELEPHONE ENCOUNTER
Pt would like to know if she should be checking her sugar levels at home? Pt requesting rx for a device to check her sugar levels.      Please advise

## 2022-08-23 NOTE — TELEPHONE ENCOUNTER
From: Cammie Thayer  Sent: 8/22/2022 11:24 AM CDT  To: [facility] Clinical Staff  Subject: Testing supplies    Yes he ordered at [facility]. In order to get through [facility], has to be Medicare supply approved pharmacy. I can go to nearest [facility] supply pharmacy. Please let Doctor know my reaction of Metformin as well as Glipizide. Swelled up eyes and lips. Dryness and itching. May be I can monitor sugar level daily and let him know.    Thanks

## 2022-08-24 ENCOUNTER — PATIENT MESSAGE (OUTPATIENT)
Dept: INTERNAL MEDICINE CLINIC | Facility: CLINIC | Age: 77
End: 2022-08-24

## 2022-08-24 NOTE — TELEPHONE ENCOUNTER
Sent additional information to [employer] asking pt and  to look at a mail order company that accepts Medicare to determine if they would like to use that company. Pending response from pt.

## 2022-08-24 NOTE — TELEPHONE ENCOUNTER
Working on pt's request for glucometer and testing supplies to be billed through [employer] via [employer]. Sent MCM to pt.

## 2022-08-26 NOTE — TELEPHONE ENCOUNTER
There has been some back and forth [facility] with both pt and her  about some diabetic testing supplies and a pulse oximeter for . Last [facility] sent to  has not been read. Copied message and sent to pt - they will need to locate a Medicare authorized pharmacy for the glucometer pt is requesting.

## 2022-09-01 ENCOUNTER — PATIENT MESSAGE (OUTPATIENT)
Dept: INTERNAL MEDICINE CLINIC | Facility: CLINIC | Age: 77
End: 2022-09-01

## 2022-09-01 DIAGNOSIS — E11.9 TYPE 2 DIABETES MELLITUS WITHOUT COMPLICATION, WITHOUT LONG-TERM CURRENT USE OF INSULIN (HCC): ICD-10-CM

## 2022-09-01 RX ORDER — BLOOD-GLUCOSE METER
KIT MISCELLANEOUS
Qty: 100 STRIP | Refills: 2 | Status: SHIPPED | OUTPATIENT
Start: 2022-09-01

## 2022-09-01 RX ORDER — BLOOD-GLUCOSE METER
1 KIT MISCELLANEOUS DAILY
Qty: 1 EACH | Refills: 0 | COMMUNITY
Start: 2022-09-01 | End: 2022-09-01

## 2022-09-01 RX ORDER — BLOOD-GLUCOSE METER
1 KIT MISCELLANEOUS DAILY
Qty: 1 EACH | Refills: 0 | Status: SHIPPED | OUTPATIENT
Start: 2022-09-01 | End: 2023-09-01

## 2022-09-01 RX ORDER — LANCETS 28 GAUGE
1 EACH MISCELLANEOUS DAILY
Qty: 100 EACH | Refills: 2 | Status: SHIPPED | OUTPATIENT
Start: 2022-09-01 | End: 2023-09-01

## 2022-09-01 NOTE — TELEPHONE ENCOUNTER
From: Nerissa Kahn  Sent: 9/1/2022 1:55 PM CDT  To: [facility] Clinical Staff  Subject: Testing supplies    Thanks for your prompt response.

## 2022-09-06 ENCOUNTER — TELEPHONE (OUTPATIENT)
Dept: INTERNAL MEDICINE CLINIC | Facility: CLINIC | Age: 77
End: 2022-09-06

## 2022-09-06 NOTE — TELEPHONE ENCOUNTER
Diabetic detailed written order completed and faxed to [email or web address]   Confirmation received   Sent to scan and copy placed in accordion

## 2022-09-12 ENCOUNTER — PATIENT MESSAGE (OUTPATIENT)
Dept: INTERNAL MEDICINE CLINIC | Facility: CLINIC | Age: 77
End: 2022-09-12

## 2022-09-12 ENCOUNTER — TELEPHONE (OUTPATIENT)
Dept: INTERNAL MEDICINE CLINIC | Facility: CLINIC | Age: 77
End: 2022-09-12

## 2022-09-12 NOTE — TELEPHONE ENCOUNTER
[facility] sent to pt explaining she will need to schedule an appointment for evaluation of her eye condition.

## 2022-09-12 NOTE — TELEPHONE ENCOUNTER
Unfortunately this in not a drive-through - place orders and get your medicines. Any eye infection needs to be seen. I do not prescribe medicines for her eyes over the phone. 1 wrong medicine can lead to a lot of trouble.   This is for your future reference

## 2022-09-12 NOTE — TELEPHONE ENCOUNTER
Patient is calling to request a prescription for eye drops. She states she has watery, itchy, and swollen eyes x 2 weeks. [facility], [address] [place] [address]  [address] [facility] [facility] [phone], [phone]      Patient is requesting to be notified through [facility] message of Dr. Leo Harding response.

## 2022-09-13 ENCOUNTER — PATIENT MESSAGE (OUTPATIENT)
Dept: INTERNAL MEDICINE CLINIC | Facility: CLINIC | Age: 77
End: 2022-09-13

## 2022-09-13 NOTE — TELEPHONE ENCOUNTER
From: Dee Flores  To: Radha Rangel  Sent: 9/12/2022 5:14 PM CDT  Subject: Prescription eye drops    Good Evening Bimla,     Dr. Sorin Guevara stated that any eye condition must be seen in the office for evaluation prior to any prescription being given. If you call in the morning, you can be scheduled with any available provider as Dr. Sorin Guevara is filled up for tomorrow with a vacation starting on Wednesday. The office number is [phone].     Thank you,   Katie Paniagua RN

## 2022-09-22 ENCOUNTER — TELEPHONE (OUTPATIENT)
Dept: INTERNAL MEDICINE CLINIC | Facility: CLINIC | Age: 77
End: 2022-09-22

## 2022-09-22 NOTE — TELEPHONE ENCOUNTER
Incoming diabetic eye exam via fax from [facility]. Flowsheet has been udpated, report placed in VM in basket for review.

## 2022-09-26 ENCOUNTER — HOSPITAL ENCOUNTER (OUTPATIENT)
Age: 77
Discharge: HOME | End: 2022-09-26
Attending: EMERGENCY MEDICINE

## 2022-09-26 VITALS
SYSTOLIC BLOOD PRESSURE: 145 MMHG | OXYGEN SATURATION: 99 % | HEIGHT: 62 IN | TEMPERATURE: 98 F | DIASTOLIC BLOOD PRESSURE: 76 MMHG | RESPIRATION RATE: 17 BRPM | BODY MASS INDEX: 19.69 KG/M2 | HEART RATE: 75 BPM | WEIGHT: 107 LBS

## 2022-09-26 DIAGNOSIS — J02.0 STREPTOCOCCAL SORE THROAT: Primary | ICD-10-CM

## 2022-09-26 LAB
S PYO AG THROAT QL: POSITIVE
SARS-COV-2 RNA RESP QL NAA+PROBE: NOT DETECTED

## 2022-09-26 PROCEDURE — 99214 OFFICE O/P EST MOD 30 MIN: CPT

## 2022-09-26 PROCEDURE — 87880 STREP A ASSAY W/OPTIC: CPT

## 2022-09-26 PROCEDURE — 99213 OFFICE O/P EST LOW 20 MIN: CPT

## 2022-09-26 RX ORDER — LIDOCAINE HYDROCHLORIDE 20 MG/ML
10 SOLUTION OROPHARYNGEAL ONCE
Status: COMPLETED | OUTPATIENT
Start: 2022-09-26 | End: 2022-09-26

## 2022-09-26 RX ORDER — AMOXICILLIN 875 MG/1
875 TABLET, COATED ORAL 2 TIMES DAILY
Qty: 20 TABLET | Refills: 0 | Status: SHIPPED | OUTPATIENT
Start: 2022-09-26 | End: 2022-10-06

## 2022-11-17 ENCOUNTER — PATIENT MESSAGE (OUTPATIENT)
Dept: INTERNAL MEDICINE CLINIC | Facility: CLINIC | Age: 77
End: 2022-11-17

## 2022-11-18 ENCOUNTER — TELEPHONE (OUTPATIENT)
Dept: INTERNAL MEDICINE CLINIC | Facility: CLINIC | Age: 77
End: 2022-11-18

## 2022-11-18 ENCOUNTER — PATIENT MESSAGE (OUTPATIENT)
Dept: INTERNAL MEDICINE CLINIC | Facility: CLINIC | Age: 77
End: 2022-11-18

## 2022-11-18 DIAGNOSIS — Z11.1 SCREENING FOR TUBERCULOSIS: Primary | ICD-10-CM

## 2022-11-18 NOTE — TELEPHONE ENCOUNTER
From: Josiah Puentes  To: Zamzam Hicks MD  Sent: 11/17/2022 8:26 PM CST  Subject: TB test Result    Please send me my latest TB results, required by my employer.  Thanks     Josiah Puentes   [phone]

## 2022-11-18 NOTE — TELEPHONE ENCOUNTER
Also received MCM from the pt = duplicate encounters.      Order for Quantiferon TB lab test placed to [facility] at pt's request. Faxed copy of this order to local [facility]  location at #[phone] and received a confirmation page that the fax went through, this was done per patient's request.     Copy of lab order placed in the outgoing mail to patient, at her request.

## 2022-11-18 NOTE — TELEPHONE ENCOUNTER
From: Harrison Rolon  To: Tuan Cornejo MD  Sent: 11/17/2022 8:26 PM CST  Subject: TB test Result    Please send me my latest TB results, required by my employer.  Thanks     Harrison Rolon   [phone]

## 2022-11-18 NOTE — TELEPHONE ENCOUNTER
Pt requesting order for Quantiferon TB blood test.    Pt requesting order for [facility] and requesting order is faxed to [address] [place] [address] in [facility], because she has had issues with it being sent electronically.       Fx: [phone]

## 2022-11-24 LAB
MITOGEN-NIL: >10 IU/ML
NIL: 0.06 IU/ML
QUANTIFERON(R)-TB GOLD PLUS, 1 TUBE: NEGATIVE
TB1-NIL: <0 IU/ML
TB2-NIL: <0 IU/ML

## 2022-12-12 ENCOUNTER — OFFICE VISIT (OUTPATIENT)
Dept: INTERNAL MEDICINE CLINIC | Facility: CLINIC | Age: 77
End: 2022-12-12
Payer: MEDICARE

## 2022-12-12 VITALS
TEMPERATURE: 98 F | DIASTOLIC BLOOD PRESSURE: 60 MMHG | BODY MASS INDEX: 18.4 KG/M2 | OXYGEN SATURATION: 99 % | HEART RATE: 77 BPM | RESPIRATION RATE: 14 BRPM | SYSTOLIC BLOOD PRESSURE: 128 MMHG | HEIGHT: 62 IN | WEIGHT: 100 LBS

## 2022-12-12 DIAGNOSIS — I25.10 ATHEROSCLEROSIS OF NATIVE CORONARY ARTERY OF NATIVE HEART WITHOUT ANGINA PECTORIS: ICD-10-CM

## 2022-12-12 DIAGNOSIS — R63.4 WEIGHT LOSS: ICD-10-CM

## 2022-12-12 DIAGNOSIS — M65.30 TRIGGER FINGER OF RIGHT HAND, UNSPECIFIED FINGER: ICD-10-CM

## 2022-12-12 DIAGNOSIS — E11.9 TYPE 2 DIABETES MELLITUS WITHOUT COMPLICATION, WITHOUT LONG-TERM CURRENT USE OF INSULIN (HCC): ICD-10-CM

## 2022-12-12 DIAGNOSIS — D50.0 IRON DEFICIENCY ANEMIA DUE TO CHRONIC BLOOD LOSS: ICD-10-CM

## 2022-12-12 DIAGNOSIS — M81.0 SENILE OSTEOPOROSIS: ICD-10-CM

## 2022-12-12 DIAGNOSIS — Z00.00 ROUTINE GENERAL MEDICAL EXAMINATION AT A HEALTH CARE FACILITY: Primary | ICD-10-CM

## 2022-12-12 DIAGNOSIS — E55.9 VITAMIN D DEFICIENCY, UNSPECIFIED: ICD-10-CM

## 2022-12-12 DIAGNOSIS — Z12.31 ENCOUNTER FOR SCREENING MAMMOGRAM FOR MALIGNANT NEOPLASM OF BREAST: ICD-10-CM

## 2022-12-12 RX ORDER — TRIAMCINOLONE ACETONIDE 1 MG/G
1 CREAM TOPICAL 2 TIMES DAILY
COMMUNITY
Start: 2022-09-13 | End: 2022-12-12

## 2022-12-12 RX ORDER — OLOPATADINE HYDROCHLORIDE 1 MG/ML
1 SOLUTION/ DROPS OPHTHALMIC DAILY PRN
COMMUNITY
Start: 2022-10-09

## 2022-12-12 RX ORDER — FERROUS SULFATE 325(65) MG
65 TABLET ORAL AS DIRECTED
COMMUNITY

## 2022-12-12 NOTE — PATIENT INSTRUCTIONS
Patient does see doctors out of various hospital system patient aware that the medical information may not show that easily.   See me after blood test.

## 2022-12-15 ENCOUNTER — TELEPHONE (OUTPATIENT)
Dept: INTERNAL MEDICINE CLINIC | Facility: CLINIC | Age: 77
End: 2022-12-15

## 2022-12-17 LAB
% SATURATION: 13 % (CALC) (ref 16–45)
ABSOLUTE BASOPHILS: 42 CELLS/UL (ref 0–200)
ABSOLUTE EOSINOPHILS: 281 CELLS/UL (ref 15–500)
ABSOLUTE LYMPHOCYTES: 2348 CELLS/UL (ref 850–3900)
ABSOLUTE MONOCYTES: 445 CELLS/UL (ref 200–950)
ABSOLUTE NEUTROPHILS: 2184 CELLS/UL (ref 1500–7800)
ALBUMIN/GLOBULIN RATIO: 1.1 (CALC) (ref 1–2.5)
ALBUMIN: 3.5 G/DL (ref 3.6–5.1)
ALKALINE PHOSPHATASE: 63 U/L (ref 37–153)
ALT: 11 U/L (ref 6–29)
APPEARANCE: CLEAR
AST: 18 U/L (ref 10–35)
BASOPHILS: 0.8 %
BILIRUBIN, TOTAL: 0.9 MG/DL (ref 0.2–1.2)
BILIRUBIN: NEGATIVE
BUN: 11 MG/DL (ref 7–25)
CALCIUM: 9.3 MG/DL (ref 8.6–10.4)
CARBON DIOXIDE: 27 MMOL/L (ref 20–32)
CHLORIDE: 98 MMOL/L (ref 98–110)
CHOL/HDLC RATIO: 2 (CALC)
CHOLESTEROL, TOTAL: 100 MG/DL
COLOR: YELLOW
CREATININE, RANDOM URINE: 96 MG/DL (ref 20–275)
CREATININE: 0.83 MG/DL (ref 0.6–1)
EGFR: 73 ML/MIN/1.73M2
EOSINOPHILS: 5.3 %
FERRITIN: 16 NG/ML (ref 16–288)
GLOBULIN: 3.3 G/DL (CALC) (ref 1.9–3.7)
GLUCOSE: 176 MG/DL (ref 65–99)
GLUCOSE: NEGATIVE
HDL CHOLESTEROL: 51 MG/DL
HEMATOCRIT: 37.1 % (ref 35–45)
HEMOGLOBIN A1C: 8 % OF TOTAL HGB
HEMOGLOBIN: 11.8 G/DL (ref 11.7–15.5)
IRON BINDING CAPACITY: 374 MCG/DL (CALC) (ref 250–450)
IRON, TOTAL: 48 MCG/DL (ref 45–160)
KETONES: NEGATIVE
LDL-CHOLESTEROL: 31 MG/DL (CALC)
LYMPHOCYTES: 44.3 %
MCH: 26.9 PG (ref 27–33)
MCHC: 31.8 G/DL (ref 32–36)
MCV: 84.5 FL (ref 80–100)
MICROALBUMIN/CREATININE RATIO, RANDOM URINE: 34 MCG/MG CREAT
MICROALBUMIN: 3.3 MG/DL
MONOCYTES: 8.4 %
MPV: 9.5 FL (ref 7.5–12.5)
NEUTROPHILS: 41.2 %
NITRITE: NEGATIVE
NON-HDL CHOLESTEROL: 49 MG/DL (CALC)
OCCULT BLOOD: NEGATIVE
PH: 7.5 (ref 5–8)
PLATELET COUNT: 413 THOUSAND/UL (ref 140–400)
POTASSIUM: 4.9 MMOL/L (ref 3.5–5.3)
PROTEIN, TOTAL: 6.8 G/DL (ref 6.1–8.1)
RDW: 14.1 % (ref 11–15)
RED BLOOD CELL COUNT: 4.39 MILLION/UL (ref 3.8–5.1)
SODIUM: 133 MMOL/L (ref 135–146)
SPECIFIC GRAVITY: 1.01 (ref 1–1.03)
T4 (THYROXINE), TOTAL: 9 MCG/DL (ref 5.1–11.9)
TRIGLYCERIDES: 101 MG/DL
VITAMIN D, 25-OH, TOTAL: 37 NG/ML (ref 30–100)
WHITE BLOOD CELL COUNT: 5.3 THOUSAND/UL (ref 3.8–10.8)

## 2022-12-19 ENCOUNTER — PATIENT MESSAGE (OUTPATIENT)
Dept: INTERNAL MEDICINE CLINIC | Facility: CLINIC | Age: 77
End: 2022-12-19

## 2022-12-20 ENCOUNTER — HOSPITAL ENCOUNTER (OUTPATIENT)
Dept: GENERAL RADIOLOGY | Age: 77
Discharge: HOME | End: 2022-12-20
Attending: INTERNAL MEDICINE
Payer: MEDICARE

## 2022-12-20 ENCOUNTER — TELEPHONE (OUTPATIENT)
Dept: INTERNAL MEDICINE CLINIC | Facility: CLINIC | Age: 77
End: 2022-12-20

## 2022-12-20 DIAGNOSIS — R63.4 WEIGHT LOSS: ICD-10-CM

## 2022-12-20 PROCEDURE — 71046 X-RAY EXAM CHEST 2 VIEWS: CPT | Performed by: INTERNAL MEDICINE

## 2022-12-20 NOTE — TELEPHONE ENCOUNTER
Did someone see my lab discussion note  I think craig has already addressed it  She needs to see me   Will address the diahorrea situation  then

## 2022-12-20 NOTE — TELEPHONE ENCOUNTER
See previous mcm TE. Pt was upset that Dr. Igor Ortiz is not available soon. Pt would like to know if it's something she can wait to be seen for or if LL can squeeze her in. Please advise.

## 2022-12-20 NOTE — TELEPHONE ENCOUNTER
[facility] RN sent [facility] to the pt yesterday on the result note asking pt to schedule a follow-up appointment with VM for discussion of the elevated A1C. Sent additional [facility] stating the diarrhea can be addressed at that appointment.

## 2022-12-20 NOTE — TELEPHONE ENCOUNTER
Pt scheduled apt.     Future Appointments   Date Time Provider [facility]   12/28/2022 11:45 AM Farida Ledesma MD [facility] 8 [facility] [place]   1/3/2023  4:00 PM DONALD BARRAZA RM1 DONALD Mcnulty

## 2022-12-29 ENCOUNTER — OFFICE VISIT (OUTPATIENT)
Dept: INTERNAL MEDICINE CLINIC | Facility: CLINIC | Age: 77
End: 2022-12-29
Payer: MEDICARE

## 2022-12-29 VITALS
OXYGEN SATURATION: 100 % | HEIGHT: 62 IN | HEART RATE: 99 BPM | SYSTOLIC BLOOD PRESSURE: 130 MMHG | RESPIRATION RATE: 16 BRPM | WEIGHT: 103 LBS | BODY MASS INDEX: 18.95 KG/M2 | TEMPERATURE: 98 F | DIASTOLIC BLOOD PRESSURE: 62 MMHG

## 2022-12-29 DIAGNOSIS — E11.9 TYPE 2 DIABETES MELLITUS WITHOUT COMPLICATION, WITHOUT LONG-TERM CURRENT USE OF INSULIN (HCC): Primary | ICD-10-CM

## 2022-12-29 PROCEDURE — 99214 OFFICE O/P EST MOD 30 MIN: CPT | Performed by: INTERNAL MEDICINE

## 2022-12-29 RX ORDER — DIPHENOXYLATE HYDROCHLORIDE AND ATROPINE SULFATE 2.5; .025 MG/1; MG/1
1 TABLET ORAL 4 TIMES DAILY PRN
Qty: 40 TABLET | Refills: 1 | Status: SHIPPED | OUTPATIENT
Start: 2022-12-29

## 2022-12-29 RX ORDER — GLIPIZIDE 2.5 MG/1
2.5 TABLET, EXTENDED RELEASE ORAL
Qty: 90 TABLET | Refills: 2 | Status: SHIPPED | OUTPATIENT
Start: 2022-12-29 | End: 2023-03-29

## 2022-12-29 NOTE — PATIENT INSTRUCTIONS
Patient will be seeing a gastroenterologist at [facility].   Patient has not been able to get in the [facility] apparently has been terminated from the practice

## 2023-01-03 ENCOUNTER — HOSPITAL ENCOUNTER (OUTPATIENT)
Dept: MAMMOGRAPHY | Age: 78
Discharge: HOME | End: 2023-01-03
Attending: INTERNAL MEDICINE
Payer: MEDICARE

## 2023-01-03 DIAGNOSIS — Z12.31 ENCOUNTER FOR SCREENING MAMMOGRAM FOR MALIGNANT NEOPLASM OF BREAST: ICD-10-CM

## 2023-01-03 DIAGNOSIS — Z00.00 ROUTINE GENERAL MEDICAL EXAMINATION AT A HEALTH CARE FACILITY: ICD-10-CM

## 2023-01-03 PROCEDURE — 77063 BREAST TOMOSYNTHESIS BI: CPT | Performed by: INTERNAL MEDICINE

## 2023-01-03 PROCEDURE — 77067 SCR MAMMO BI INCL CAD: CPT | Performed by: INTERNAL MEDICINE

## 2023-01-04 ENCOUNTER — APPOINTMENT (OUTPATIENT)
Dept: URBAN - METROPOLITAN AREA CLINIC 242 | Age: 78
Setting detail: DERMATOLOGY
End: 2023-01-04

## 2023-01-04 DIAGNOSIS — L20.89 OTHER ATOPIC DERMATITIS: ICD-10-CM

## 2023-01-04 PROBLEM — L20.84 INTRINSIC (ALLERGIC) ECZEMA: Status: ACTIVE | Noted: 2023-01-04

## 2023-01-04 PROCEDURE — OTHER PRESCRIPTION: OTHER

## 2023-01-04 PROCEDURE — OTHER COUNSELING: OTHER

## 2023-01-04 PROCEDURE — 99214 OFFICE O/P EST MOD 30 MIN: CPT

## 2023-01-04 PROCEDURE — OTHER PRESCRIPTION MEDICATION MANAGEMENT: OTHER

## 2023-01-04 RX ORDER — PREDNISONE 20 MG/1
TABLET ORAL
Qty: 14 | Refills: 0 | Status: ERX

## 2023-01-04 ASSESSMENT — LOCATION SIMPLE DESCRIPTION DERM
LOCATION SIMPLE: LEFT FOREARM
LOCATION SIMPLE: RIGHT FOREARM
LOCATION SIMPLE: CHEST
LOCATION SIMPLE: RIGHT UPPER BACK
LOCATION SIMPLE: RIGHT THIGH
LOCATION SIMPLE: TRAPEZIAL NECK
LOCATION SIMPLE: LEFT THIGH

## 2023-01-04 ASSESSMENT — LOCATION DETAILED DESCRIPTION DERM
LOCATION DETAILED: MIDDLE STERNUM
LOCATION DETAILED: LEFT ANTERIOR DISTAL THIGH
LOCATION DETAILED: RIGHT ANTERIOR DISTAL THIGH
LOCATION DETAILED: RIGHT MEDIAL UPPER BACK
LOCATION DETAILED: RIGHT PROXIMAL DORSAL FOREARM
LOCATION DETAILED: LEFT PROXIMAL DORSAL FOREARM
LOCATION DETAILED: MID TRAPEZIAL NECK

## 2023-01-04 ASSESSMENT — LOCATION ZONE DERM
LOCATION ZONE: LEG
LOCATION ZONE: NECK
LOCATION ZONE: ARM
LOCATION ZONE: TRUNK

## 2023-01-31 ENCOUNTER — APPOINTMENT (OUTPATIENT)
Dept: URBAN - METROPOLITAN AREA CLINIC 242 | Age: 78
Setting detail: DERMATOLOGY
End: 2023-01-31

## 2023-01-31 DIAGNOSIS — L20.89 OTHER ATOPIC DERMATITIS: ICD-10-CM

## 2023-01-31 PROBLEM — L20.84 INTRINSIC (ALLERGIC) ECZEMA: Status: ACTIVE | Noted: 2023-01-31

## 2023-01-31 PROCEDURE — OTHER COUNSELING: OTHER

## 2023-01-31 PROCEDURE — OTHER INTRAMUSCULAR KENALOG: OTHER

## 2023-01-31 PROCEDURE — 96372 THER/PROPH/DIAG INJ SC/IM: CPT

## 2023-01-31 PROCEDURE — 99214 OFFICE O/P EST MOD 30 MIN: CPT | Mod: 25

## 2023-01-31 ASSESSMENT — LOCATION DETAILED DESCRIPTION DERM
LOCATION DETAILED: RIGHT MEDIAL UPPER BACK
LOCATION DETAILED: RIGHT PROXIMAL DORSAL FOREARM
LOCATION DETAILED: RIGHT ANTERIOR DISTAL THIGH
LOCATION DETAILED: LEFT BUTTOCK
LOCATION DETAILED: MID TRAPEZIAL NECK
LOCATION DETAILED: LEFT PROXIMAL DORSAL FOREARM
LOCATION DETAILED: MIDDLE STERNUM
LOCATION DETAILED: LEFT ANTERIOR DISTAL THIGH

## 2023-01-31 ASSESSMENT — LOCATION SIMPLE DESCRIPTION DERM
LOCATION SIMPLE: RIGHT THIGH
LOCATION SIMPLE: TRAPEZIAL NECK
LOCATION SIMPLE: RIGHT UPPER BACK
LOCATION SIMPLE: RIGHT FOREARM
LOCATION SIMPLE: CHEST
LOCATION SIMPLE: LEFT BUTTOCK
LOCATION SIMPLE: LEFT FOREARM
LOCATION SIMPLE: LEFT THIGH

## 2023-01-31 ASSESSMENT — LOCATION ZONE DERM
LOCATION ZONE: ARM
LOCATION ZONE: NECK
LOCATION ZONE: TRUNK
LOCATION ZONE: LEG

## 2023-01-31 NOTE — PROCEDURE: INTRAMUSCULAR KENALOG
Concentration (Mg/Ml): 40.0 CONSTITUTIONAL: FEVERS. No chills, fatigue, lightheadedness, weakness, unexpected weight change  HEENT: No vision changes, discharge from eyes, nasal congestion, runny nose, sore throat, ear pain or discharge  CV: No chest pain  PULM: No cough, shortness of breath  GI: No abdominal pain, nausea, vomiting, diarrhea, constipation  : No dysuria, polyuria, hematuria  SKIN: No rashes, swelling, open wounds or ulcers  MSK: bilateral knee pain  NEURO: No headache

## 2023-02-14 ENCOUNTER — APPOINTMENT (OUTPATIENT)
Dept: URBAN - METROPOLITAN AREA CLINIC 242 | Age: 78
Setting detail: DERMATOLOGY
End: 2023-02-14

## 2023-02-14 DIAGNOSIS — L20.89 OTHER ATOPIC DERMATITIS: ICD-10-CM

## 2023-02-14 PROBLEM — L20.84 INTRINSIC (ALLERGIC) ECZEMA: Status: ACTIVE | Noted: 2023-02-14

## 2023-02-14 PROCEDURE — OTHER COUNSELING: OTHER

## 2023-02-14 PROCEDURE — 99213 OFFICE O/P EST LOW 20 MIN: CPT

## 2023-02-14 PROCEDURE — OTHER PRESCRIPTION MEDICATION MANAGEMENT: OTHER

## 2023-02-14 ASSESSMENT — LOCATION SIMPLE DESCRIPTION DERM
LOCATION SIMPLE: LEFT ANTERIOR NECK
LOCATION SIMPLE: POSTERIOR NECK

## 2023-02-14 ASSESSMENT — LOCATION ZONE DERM: LOCATION ZONE: NECK

## 2023-02-14 ASSESSMENT — LOCATION DETAILED DESCRIPTION DERM
LOCATION DETAILED: LEFT INFERIOR ANTERIOR NECK
LOCATION DETAILED: MID POSTERIOR NECK

## 2023-02-14 NOTE — PROCEDURE: PRESCRIPTION MEDICATION MANAGEMENT
Render In Strict Bullet Format?: No
Plan: Moisturize daily
Initiate Treatment: TAC .1% bid for 2 weeks prn. Pt has at home.
Detail Level: Zone

## 2023-02-20 ENCOUNTER — TELEPHONE (OUTPATIENT)
Dept: INTERNAL MEDICINE CLINIC | Facility: CLINIC | Age: 78
End: 2023-02-20

## 2023-02-20 DIAGNOSIS — Z95.5 STENTED CORONARY ARTERY: ICD-10-CM

## 2023-02-20 DIAGNOSIS — I25.10 ATHEROSCLEROSIS OF NATIVE CORONARY ARTERY OF NATIVE HEART, UNSPECIFIED WHETHER ANGINA PRESENT: Primary | ICD-10-CM

## 2023-02-20 DIAGNOSIS — E78.00 PURE HYPERCHOLESTEROLEMIA: ICD-10-CM

## 2023-02-20 NOTE — TELEPHONE ENCOUNTER
Patient request orders for CT Calcium score   Due to family hx of heart disease, she wants insurance to cover it, she is not willing to schedule and pay out of pocket.     due for follow up end of March, declined to schedule appt at this time

## 2023-06-22 ENCOUNTER — APPOINTMENT (OUTPATIENT)
Dept: URBAN - METROPOLITAN AREA CLINIC 242 | Age: 78
Setting detail: DERMATOLOGY
End: 2023-06-22

## 2023-06-22 DIAGNOSIS — L20.89 OTHER ATOPIC DERMATITIS: ICD-10-CM

## 2023-06-22 PROBLEM — L20.84 INTRINSIC (ALLERGIC) ECZEMA: Status: ACTIVE | Noted: 2023-06-22

## 2023-06-22 PROCEDURE — OTHER COUNSELING: OTHER

## 2023-06-22 PROCEDURE — 99213 OFFICE O/P EST LOW 20 MIN: CPT

## 2023-06-22 PROCEDURE — OTHER PRESCRIPTION MEDICATION MANAGEMENT: OTHER

## 2023-06-22 ASSESSMENT — LOCATION SIMPLE DESCRIPTION DERM
LOCATION SIMPLE: RIGHT CALF
LOCATION SIMPLE: LEFT POPLITEAL SKIN
LOCATION SIMPLE: CHEST
LOCATION SIMPLE: LEFT UPPER ARM
LOCATION SIMPLE: RIGHT UPPER ARM

## 2023-06-22 ASSESSMENT — LOCATION DETAILED DESCRIPTION DERM
LOCATION DETAILED: RIGHT ANTERIOR PROXIMAL UPPER ARM
LOCATION DETAILED: LEFT POPLITEAL SKIN
LOCATION DETAILED: LEFT ANTERIOR PROXIMAL UPPER ARM
LOCATION DETAILED: RIGHT PROXIMAL CALF
LOCATION DETAILED: LEFT MEDIAL SUPERIOR CHEST

## 2023-06-22 ASSESSMENT — LOCATION ZONE DERM
LOCATION ZONE: TRUNK
LOCATION ZONE: ARM
LOCATION ZONE: LEG

## 2023-06-22 NOTE — PROCEDURE: PRESCRIPTION MEDICATION MANAGEMENT
Plan: Moisturize daily to prevent dry skin, shower after physical activity
Detail Level: Zone
Continue Regimen: TAC 0.1% BID for 2 weeks per flare up
Render In Strict Bullet Format?: No

## 2023-07-21 ENCOUNTER — APPOINTMENT (OUTPATIENT)
Dept: GENERAL RADIOLOGY | Age: 78
End: 2023-07-21
Attending: NURSE PRACTITIONER
Payer: MEDICARE

## 2023-07-21 ENCOUNTER — HOSPITAL ENCOUNTER (OUTPATIENT)
Age: 78
Discharge: HOME | End: 2023-07-21
Payer: MEDICARE

## 2023-07-21 VITALS
HEART RATE: 73 BPM | BODY MASS INDEX: 18.88 KG/M2 | DIASTOLIC BLOOD PRESSURE: 74 MMHG | WEIGHT: 100 LBS | OXYGEN SATURATION: 99 % | TEMPERATURE: 99 F | HEIGHT: 61 IN | SYSTOLIC BLOOD PRESSURE: 168 MMHG | RESPIRATION RATE: 18 BRPM

## 2023-07-21 DIAGNOSIS — S59.902A INJURY OF LEFT ELBOW, INITIAL ENCOUNTER: Primary | ICD-10-CM

## 2023-07-21 PROCEDURE — 99214 OFFICE O/P EST MOD 30 MIN: CPT

## 2023-07-21 PROCEDURE — 29105 APPLICATION LONG ARM SPLINT: CPT

## 2023-07-21 PROCEDURE — 73080 X-RAY EXAM OF ELBOW: CPT | Performed by: NURSE PRACTITIONER

## 2023-07-21 PROCEDURE — 73090 X-RAY EXAM OF FOREARM: CPT | Performed by: NURSE PRACTITIONER

## 2023-07-21 RX ORDER — GLIPIZIDE 5 MG/1
TABLET, FILM COATED, EXTENDED RELEASE ORAL
COMMUNITY
Start: 2023-04-14

## 2023-07-21 RX ORDER — PIOGLITAZONEHYDROCHLORIDE 15 MG/1
15 TABLET ORAL
COMMUNITY
Start: 2023-04-14

## 2023-07-21 RX ORDER — MESALAMINE 1.2 G/1
1.2 TABLET, DELAYED RELEASE ORAL 4 TIMES DAILY
COMMUNITY
Start: 2023-05-09

## 2023-07-21 NOTE — ED INITIAL ASSESSMENT (HPI)
C/o left forearm pain for 1 day. Pt reports she was wearing long pajamas and tripped over them and fell on carpet and fell on her arm. Pt denies elbow and wrist pain. Pt reports she wrapped her arm and took otc meds for pain. Pt reports pain during palpation. ROM with pain. Pt denies head injury.

## 2023-07-22 NOTE — DISCHARGE INSTRUCTIONS
Keep splint dry and in place  Elevate extremity often-above the level of the heart is best  Apply cold packs -at least 4 times per day- over the splint (over the area that is injured). May take acetaminophen or ibuprofen if needed for discomfort.   Wear sling while upright    Follow up with orthopedist, as directed  Follow up sooner for increased  Pain, swelling, numbness, tingling, weakness, pale/cool extremity

## 2023-07-26 ENCOUNTER — OFFICE VISIT (OUTPATIENT)
Dept: ORTHOPEDICS CLINIC | Facility: CLINIC | Age: 78
End: 2023-07-26
Payer: MEDICARE

## 2023-07-26 VITALS — HEIGHT: 61 IN | WEIGHT: 100 LBS | BODY MASS INDEX: 18.88 KG/M2

## 2023-07-26 DIAGNOSIS — S53.402A SPRAIN OF LEFT ELBOW, INITIAL ENCOUNTER: Primary | ICD-10-CM

## 2023-07-26 PROCEDURE — 1125F AMNT PAIN NOTED PAIN PRSNT: CPT | Performed by: ORTHOPAEDIC SURGERY

## 2023-07-26 PROCEDURE — 99203 OFFICE O/P NEW LOW 30 MIN: CPT | Performed by: ORTHOPAEDIC SURGERY

## 2023-07-26 RX ORDER — MELOXICAM 7.5 MG/1
7.5 TABLET ORAL DAILY
Qty: 14 TABLET | Refills: 0 | Status: SHIPPED | OUTPATIENT
Start: 2023-07-26

## 2023-07-31 ENCOUNTER — TELEPHONE (OUTPATIENT)
Dept: PHYSICAL THERAPY | Facility: HOSPITAL | Age: 78
End: 2023-07-31

## 2023-07-31 ENCOUNTER — TELEPHONE (OUTPATIENT)
Dept: ORTHOPEDICS CLINIC | Facility: CLINIC | Age: 78
End: 2023-07-31

## 2023-07-31 ENCOUNTER — HOSPITAL ENCOUNTER (EMERGENCY)
Facility: HOSPITAL | Age: 78
Discharge: HOME | End: 2023-07-31
Attending: EMERGENCY MEDICINE
Payer: MEDICARE

## 2023-07-31 ENCOUNTER — APPOINTMENT (OUTPATIENT)
Dept: PHYSICAL THERAPY | Facility: HOSPITAL | Age: 78
End: 2023-07-31
Attending: ORTHOPAEDIC SURGERY
Payer: MEDICARE

## 2023-07-31 ENCOUNTER — APPOINTMENT (OUTPATIENT)
Dept: GENERAL RADIOLOGY | Facility: HOSPITAL | Age: 78
End: 2023-07-31
Attending: EMERGENCY MEDICINE
Payer: MEDICARE

## 2023-07-31 VITALS
SYSTOLIC BLOOD PRESSURE: 141 MMHG | WEIGHT: 100 LBS | RESPIRATION RATE: 18 BRPM | DIASTOLIC BLOOD PRESSURE: 68 MMHG | BODY MASS INDEX: 18.4 KG/M2 | HEART RATE: 61 BPM | TEMPERATURE: 98 F | OXYGEN SATURATION: 100 % | HEIGHT: 62 IN

## 2023-07-31 DIAGNOSIS — S52.125A CLOSED NONDISPLACED FRACTURE OF HEAD OF LEFT RADIUS, INITIAL ENCOUNTER: Primary | ICD-10-CM

## 2023-07-31 DIAGNOSIS — S53.402A SPRAIN OF LEFT ELBOW, INITIAL ENCOUNTER: Primary | ICD-10-CM

## 2023-07-31 PROCEDURE — 99284 EMERGENCY DEPT VISIT MOD MDM: CPT

## 2023-07-31 PROCEDURE — 29105 APPLICATION LONG ARM SPLINT: CPT

## 2023-07-31 PROCEDURE — 73080 X-RAY EXAM OF ELBOW: CPT | Performed by: EMERGENCY MEDICINE

## 2023-07-31 NOTE — TELEPHONE ENCOUNTER
Patient calling saying that she has a lot of pain still in her lt elbow. She stated that she wants to go ahead with an MRI to see if anything else is going on. She stated that she is going on vacation and wants the MRI completed before leaving. She might try and go to the ER to get immediate assistance. Please advise.      Future Appointments   Date Time Provider Taras Yepez   7/31/2023  3:30 PM Romelia Meléndez, PT [facility] PHYS [address]

## 2023-07-31 NOTE — TELEPHONE ENCOUNTER
Karyl Aschoff, MD   to [facility] 23 7/31/23  2:20 PM   She can see Ammon Apley tomorrow and Wednesday.

## 2023-07-31 NOTE — TELEPHONE ENCOUNTER
Patient went to the ER after speaking with our office and was advised she has a fracture in her elbow. Patient is requesting to get in ASAP. Please advise.

## 2023-07-31 NOTE — TELEPHONE ENCOUNTER
Requesting MRI  LOV: 07/26/23    Per last visit: \"We did discuss MRI of the elbow if her symptoms do not resolve with therapy. \"    MRI - pending    - she is going on vacation and wants the MRI completed before leaving. She might try and go to the ER to get immediate assistance.

## 2023-07-31 NOTE — ED INITIAL ASSESSMENT (HPI)
Pt reports on 7/19 she had a fall, was already seen at [address] [place] [address] for left arm elbow and forearm pain was given a sling, no fracture and did follow up with ortho. Pt reports she was supposed to follow up with physical therapy that was to start today but pt reports pain increasingly became worse last night. Arrives with sling on left arm, pt also utilizing arm to take phone calls/texts during assessment. Pt reports she would like an MRI to rule out a hair line fracture.

## 2023-07-31 NOTE — TELEPHONE ENCOUNTER
When should patient be seen? Went to ER Results  CONCLUSION:  Redemonstration of nondisplaced fracture along the left radial neck. There is decrease in size of a medium-sized elbow joint effusion.

## 2023-08-02 ENCOUNTER — PATIENT OUTREACH (OUTPATIENT)
Dept: CASE MANAGEMENT | Age: 78
End: 2023-08-02

## 2023-08-02 ENCOUNTER — OFFICE VISIT (OUTPATIENT)
Dept: ORTHOPEDICS CLINIC | Facility: CLINIC | Age: 78
End: 2023-08-02
Payer: MEDICARE

## 2023-08-02 VITALS — BODY MASS INDEX: 18.4 KG/M2 | WEIGHT: 100 LBS | HEIGHT: 62 IN

## 2023-08-02 DIAGNOSIS — S52.135A CLOSED NONDISPLACED FRACTURE OF NECK OF LEFT RADIUS, INITIAL ENCOUNTER: Primary | ICD-10-CM

## 2023-08-02 PROCEDURE — 1125F AMNT PAIN NOTED PAIN PRSNT: CPT | Performed by: PHYSICIAN ASSISTANT

## 2023-08-02 PROCEDURE — 99213 OFFICE O/P EST LOW 20 MIN: CPT | Performed by: PHYSICIAN ASSISTANT

## 2023-08-02 NOTE — PROGRESS NOTES
1st attempt ED f/up apt request   PCP -decline, pt stated \"I dont need that\"  ORTHO -existing apt (8/2)  Closing encounter

## 2023-08-03 ENCOUNTER — OFFICE VISIT (OUTPATIENT)
Dept: PHYSICAL THERAPY | Age: 78
End: 2023-08-03
Attending: PHYSICIAN ASSISTANT
Payer: MEDICARE

## 2023-08-03 DIAGNOSIS — S52.135A CLOSED NONDISPLACED FRACTURE OF NECK OF LEFT RADIUS, INITIAL ENCOUNTER: Primary | ICD-10-CM

## 2023-08-03 PROCEDURE — 97161 PT EVAL LOW COMPLEX 20 MIN: CPT

## 2023-08-03 PROCEDURE — 97110 THERAPEUTIC EXERCISES: CPT

## 2023-08-07 ENCOUNTER — OFFICE VISIT (OUTPATIENT)
Dept: PHYSICAL THERAPY | Age: 78
End: 2023-08-07
Attending: PHYSICIAN ASSISTANT
Payer: MEDICARE

## 2023-08-07 PROCEDURE — 97110 THERAPEUTIC EXERCISES: CPT

## 2023-08-07 NOTE — PROGRESS NOTES
Diagnosis:   Closed nondisplaced fracture of neck of left radius, initial encounter (E17.727I)           Referring Provider: Nani Urrutia  Date of Evaluation:    8/3/23      Precautions:   osteoporosis Next MD visit:   none scheduled  Date of Surgery: n/a   Insurance Primary/Secondary: MEDICARE / [address] [place] [address]     # Auth Visits: 15            Subjective: Patient reports feeling better since last tx, she states she has been compliant w/ her HEP. Pain: 2/10 at rest 4/10 with movement      Objective: Body mechanics: patient instructed to maintain core tightness and omit excessive torso movement during UE exercises to reduce back pain      Assessment: Patient was able to tolerate today's treatment. Wrist extension based movements were painful and pt had to be regressed from 1lb db to no weight due to pain. Patient presents w/ large strength deficit but should be on track to improve as long as she is compliant w/ HEP. Goals: To be met in 12 visits  Pt will improve L  strength to >35 lbs to be able to open a jar without difficulty   Pt will demonstrate improved elbow extensor mm tension to WNL to be able to carry objects at home and work >10 lbs with <2/10 elbow pain   Pt will have WNL wrist extensor mm flexibility to be able to drive without pain   Pt will be independent and compliant with comprehensive HEP to maintain progress achieved in PT    Plan: Continue to progress wrist flexion, extension, and elbow flexion/extension strength within tolerance   Date: 8/7/2023  TX#: 2/15 Date:                 TX#: 3/ Date:                 TX#: 4/ Date:                 TX#: 5/ Date:    Tx#: 6/   TherEx: 45min  UBE level 1 5min  Standing tomer curl 3ct hold 2 x 15  Seated wrist extensions 1lb db 2 x 20  Seated wrist flexions 2 x 20  Tomer rotations fwd 2 x 30sec  Standing single arm curls 2ct hold, 5ct eccentric 1lb db x 15                                HEP: - Seated Elbow Flexion and Extension AROM  - 3 x daily - 20 reps  - Wrist Flexor Stretch in Pronation  - 3 x daily - 3 sets - 30\" hold  - Standing Wrist Flexion Stretch  - 3 x daily - 3 sets - 30\" hold  - Seated Gripping Towel  - 3 x daily - 20 reps - 5\" hold    Charges:  TherEx: 3 units       Total Timed Treatment: 45 min  Total Treatment Time: 45 min

## 2023-08-09 ENCOUNTER — OFFICE VISIT (OUTPATIENT)
Dept: PHYSICAL THERAPY | Age: 78
End: 2023-08-09
Attending: PHYSICIAN ASSISTANT
Payer: MEDICARE

## 2023-08-09 PROCEDURE — 97110 THERAPEUTIC EXERCISES: CPT

## 2023-08-09 NOTE — PROGRESS NOTES
Diagnosis:   Closed nondisplaced fracture of neck of left radius, initial encounter (Q70.349Z)           Referring Provider: Germán Sandoval  Date of Evaluation:    8/3/23      Precautions:   osteoporosis Next MD visit:   none scheduled  Date of Surgery: n/a   Insurance Primary/Secondary: MEDICARE / [address] [place] [address]     # Auth Visits: 15            Subjective: Patient reports feeling better since last tx however pressure and weight-bearing movements are painful. Pain: 2/10 at rest 4/10 with movement      Objective: Body mechanics: patient instructed to maintain elbow flexion 90deg and arm at side to focus movement on wrist coordination w/o compensation. Assessment: Patient was able to tolerate today's treatment well, still c/o pain intermittently, however was able to be progressed to more involved dynamic wrist movements. Goals: To be met in 12 visits  Pt will improve L  strength to >35 lbs to be able to open a jar without difficulty   Pt will demonstrate improved elbow extensor mm tension to WNL to be able to carry objects at home and work >10 lbs with <2/10 elbow pain   Pt will have WNL wrist extensor mm flexibility to be able to drive without pain   Pt will be independent and compliant with comprehensive HEP to maintain progress achieved in PT    Plan: Continue to progress wrist flexion, extension, and elbow flexion/extension strength within tolerance   Date: 8/7/2023  TX#: 2/15 Date:    8/9/23             TX#: 3/15 Date:                 TX#: 4/ Date:                 TX#: 5/ Date:    Tx#: 6/   TherEx: 45min  UBE level 1 5min  Standing tomer curl 3ct hold 2 x 15  Seated wrist extensions 1lb db 2 x 20  Seated wrist flexions 2 x 20  Tomer rotations fwd 2 x 30sec  Standing single arm curls 2ct hold, 5ct eccentric 1lb db x 15     TherEx: 45min  UBE level 1 5min  Seated circular rows w/ tomer cw/ccw x 20  Seated kayak rows x 20  Towel under armpit hand maze x 10  Towel under armpit tennis ball frisbee rounds cw/ccw x 20  Towel under armpit tennis ball jackelyn montaño says (numbers) x 20  Hand balance wrist board cw/ccw/ fwd/ bwd x 20   Towel scrunches on table 1lb db x 10                           HEP: - Seated Elbow Flexion and Extension AROM  - 3 x daily - 20 reps  - Wrist Flexor Stretch in Pronation  - 3 x daily - 3 sets - 30\" hold  - Standing Wrist Flexion Stretch  - 3 x daily - 3 sets - 30\" hold  - Seated Gripping Towel  - 3 x daily - 20 reps - 5\" hold    Charges:  TherEx: 3 units       Total Timed Treatment: 45 min  Total Treatment Time: 45 min

## 2023-08-14 ENCOUNTER — OFFICE VISIT (OUTPATIENT)
Dept: PHYSICAL THERAPY | Age: 78
End: 2023-08-14
Attending: PHYSICIAN ASSISTANT
Payer: MEDICARE

## 2023-08-14 PROCEDURE — 97110 THERAPEUTIC EXERCISES: CPT

## 2023-08-14 NOTE — PROGRESS NOTES
Diagnosis:   Closed nondisplaced fracture of neck of left radius, initial encounter (T39.437F)           Referring Provider: Danny Peterson  Date of Evaluation:    8/3/23      Precautions:   osteoporosis Next MD visit:   none scheduled  Date of Surgery: n/a   Insurance Primary/Secondary: MEDICARE / Rogerio Barney     # Auth Visits: 15            Subjective: Patient reports much increased soreness after previous tx. Pain: 5-6/10      Objective: Body mechanics: patient instructed to maintain elbow flexion 90deg and arm at side to focus movement on wrist coordination w/o compensation. Assessment: Patient was able to tolerate today's treatment well with all movements regressed to fit within patient's tolerance. Strength of L UE still lacking. Goals: To be met in 12 visits  Pt will improve L  strength to >35 lbs to be able to open a jar without difficulty   Pt will demonstrate improved elbow extensor mm tension to WNL to be able to carry objects at home and work >10 lbs with <2/10 elbow pain   Pt will have WNL wrist extensor mm flexibility to be able to drive without pain   Pt will be independent and compliant with comprehensive HEP to maintain progress achieved in PT    Plan: Continue to progress wrist flexion, extension, and elbow flexion/extension strength within tolerance   Date: 8/7/2023  TX#: 2/15 Date:    8/9/23             TX#: 3/15 Date:   8/14/23              TX#: 4/15 Date:                 TX#: 5/ Date:    Tx#: 6/   TherEx: 45min  UBE level 1 5min  Standing tomer curl 3ct hold 2 x 15  Seated wrist extensions 1lb db 2 x 20  Seated wrist flexions 2 x 20  Tomer rotations fwd 2 x 30sec  Standing single arm curls 2ct hold, 5ct eccentric 1lb db x 15     TherEx: 45min  UBE level 1 5min  Seated circular rows w/ tomer cw/ccw x 20  Seated kayak rows x 20  Towel under armpit hand maze x 10  Towel under armpit tennis ball frisbee rounds cw/ccw x 20  Towel under armpit tennis ball frisbee danyel says (numbers) x 20  Hand balance wrist board cw/ccw/ fwd/ bwd x 20   Towel scrunches on table 1lb db x 10 TherEx: 40min  UBE lvl 1 3min fwd/ 3min bwd  Towel circles wall x 20  Towel slides lateral x 20  Towel slides superior/inferior x 20  Tomer rows cw/ccw x 20  Tomer curls x 20       Modalities:   Hotpack 5min                   HEP: - Seated Elbow Flexion and Extension AROM  - 3 x daily - 20 reps  - Wrist Flexor Stretch in Pronation  - 3 x daily - 3 sets - 30\" hold  - Standing Wrist Flexion Stretch  - 3 x daily - 3 sets - 30\" hold  - Seated Gripping Towel  - 3 x daily - 20 reps - 5\" hold    Charges:  TherEx: 3 units       Total Timed Treatment: 45 min  Total Treatment Time: 45 min

## 2023-08-21 ENCOUNTER — HOSPITAL ENCOUNTER (EMERGENCY)
Facility: HOSPITAL | Age: 78
Discharge: HOME | End: 2023-08-21
Attending: EMERGENCY MEDICINE
Payer: MEDICARE

## 2023-08-21 VITALS
SYSTOLIC BLOOD PRESSURE: 156 MMHG | DIASTOLIC BLOOD PRESSURE: 72 MMHG | TEMPERATURE: 98 F | RESPIRATION RATE: 16 BRPM | HEART RATE: 77 BPM | OXYGEN SATURATION: 100 %

## 2023-08-21 DIAGNOSIS — L03.211 FACIAL CELLULITIS: Primary | ICD-10-CM

## 2023-08-21 PROCEDURE — 99283 EMERGENCY DEPT VISIT LOW MDM: CPT

## 2023-08-21 PROCEDURE — 99284 EMERGENCY DEPT VISIT MOD MDM: CPT

## 2023-08-21 RX ORDER — CEPHALEXIN 500 MG/1
500 CAPSULE ORAL 4 TIMES DAILY
Qty: 28 CAPSULE | Refills: 0 | Status: SHIPPED | OUTPATIENT
Start: 2023-08-21 | End: 2023-08-28

## 2023-08-21 NOTE — DISCHARGE INSTRUCTIONS
Use a moisturizer like Eucerin on your face. Avoid detergents soaps and use a mild soap like tone or Dove.    Keflex antibiotic  You may apply a cool compress to the area for discomfort and take Tylenol for pain  Over-the-counter nonsedating antihistamine daytime like Zyrtec with Benadryl at nighttime for itchiness    Follow-up with a dermatologist tomorrow as planned

## 2023-08-22 ENCOUNTER — APPOINTMENT (OUTPATIENT)
Dept: PHYSICAL THERAPY | Age: 78
End: 2023-08-22
Attending: PHYSICIAN ASSISTANT
Payer: MEDICARE

## 2023-08-22 ENCOUNTER — APPOINTMENT (OUTPATIENT)
Dept: URBAN - METROPOLITAN AREA CLINIC 242 | Age: 78
Setting detail: DERMATOLOGY
End: 2023-08-22

## 2023-08-22 DIAGNOSIS — L20.89 OTHER ATOPIC DERMATITIS: ICD-10-CM

## 2023-08-22 PROCEDURE — OTHER MIPS QUALITY: OTHER

## 2023-08-22 PROCEDURE — OTHER COUNSELING: OTHER

## 2023-08-22 PROCEDURE — OTHER INTRAMUSCULAR KENALOG: OTHER

## 2023-08-22 PROCEDURE — 99214 OFFICE O/P EST MOD 30 MIN: CPT | Mod: 25

## 2023-08-22 PROCEDURE — OTHER SEPARATE AND IDENTIFIABLE DOCUMENTATION: OTHER

## 2023-08-22 PROCEDURE — OTHER PRESCRIPTION MEDICATION MANAGEMENT: OTHER

## 2023-08-22 PROCEDURE — OTHER PRESCRIPTION: OTHER

## 2023-08-22 PROCEDURE — 96372 THER/PROPH/DIAG INJ SC/IM: CPT

## 2023-08-22 RX ORDER — TRIAMCINOLONE ACETONIDE 1 MG/G
CREAM TOPICAL
Qty: 30 | Refills: 1 | Status: ERX | COMMUNITY
Start: 2023-08-22

## 2023-08-22 ASSESSMENT — LOCATION SIMPLE DESCRIPTION DERM
LOCATION SIMPLE: POSTERIOR NECK
LOCATION SIMPLE: LEFT BUTTOCK
LOCATION SIMPLE: LEFT UPPER ARM
LOCATION SIMPLE: RIGHT LIP
LOCATION SIMPLE: RIGHT SUPERIOR EYELID
LOCATION SIMPLE: LEFT INFERIOR EYELID
LOCATION SIMPLE: RIGHT INFERIOR EYELID
LOCATION SIMPLE: LEFT SUPERIOR EYELID
LOCATION SIMPLE: RIGHT UPPER ARM

## 2023-08-22 ASSESSMENT — LOCATION DETAILED DESCRIPTION DERM
LOCATION DETAILED: RIGHT INFERIOR POSTERIOR NECK
LOCATION DETAILED: RIGHT MEDIAL INFERIOR EYELID
LOCATION DETAILED: LEFT LATERAL INFERIOR PRESEPTAL REGION
LOCATION DETAILED: LEFT BUTTOCK
LOCATION DETAILED: LEFT INFERIOR POSTERIOR NECK
LOCATION DETAILED: RIGHT LOWER CUTANEOUS LIP
LOCATION DETAILED: LEFT LATERAL SUPERIOR EYELID
LOCATION DETAILED: RIGHT ANTECUBITAL SKIN
LOCATION DETAILED: RIGHT MEDIAL SUPERIOR EYELID
LOCATION DETAILED: LEFT ANTECUBITAL SKIN

## 2023-08-22 ASSESSMENT — LOCATION ZONE DERM
LOCATION ZONE: NECK
LOCATION ZONE: TRUNK
LOCATION ZONE: ARM
LOCATION ZONE: EYELID
LOCATION ZONE: LIP

## 2023-08-22 NOTE — PROCEDURE: PRESCRIPTION MEDICATION MANAGEMENT
Detail Level: Zone
Initiate Treatment: TAC .1% bid for 2 weeks prn
Render In Strict Bullet Format?: No
Plan: Moisturize daily
Samples Given: Moisturizers and cleansers

## 2023-08-24 ENCOUNTER — PATIENT OUTREACH (OUTPATIENT)
Dept: CASE MANAGEMENT | Age: 78
End: 2023-08-24

## 2023-08-24 NOTE — PROGRESS NOTES
1st attempt ED f/up apt request   PCP -decline, pt stated she's feeling better and doesn't need apt  Closing encounter

## 2023-08-25 ENCOUNTER — APPOINTMENT (OUTPATIENT)
Dept: PHYSICAL THERAPY | Age: 78
End: 2023-08-25
Attending: PHYSICIAN ASSISTANT
Payer: MEDICARE

## 2023-08-25 ENCOUNTER — TELEPHONE (OUTPATIENT)
Dept: PHYSICAL THERAPY | Age: 78
End: 2023-08-25

## 2023-08-29 ENCOUNTER — APPOINTMENT (OUTPATIENT)
Dept: URBAN - METROPOLITAN AREA CLINIC 242 | Age: 78
Setting detail: DERMATOLOGY
End: 2023-08-29

## 2023-08-29 ENCOUNTER — APPOINTMENT (OUTPATIENT)
Dept: PHYSICAL THERAPY | Age: 78
End: 2023-08-29
Attending: PHYSICIAN ASSISTANT
Payer: MEDICARE

## 2023-08-29 DIAGNOSIS — L20.89 OTHER ATOPIC DERMATITIS: ICD-10-CM

## 2023-08-29 DIAGNOSIS — B00.1 HERPESVIRAL VESICULAR DERMATITIS: ICD-10-CM

## 2023-08-29 PROBLEM — L08.9 LOCAL INFECTION OF THE SKIN AND SUBCUTANEOUS TISSUE, UNSPECIFIED: Status: ACTIVE | Noted: 2023-08-29

## 2023-08-29 PROCEDURE — OTHER COUNSELING: OTHER

## 2023-08-29 PROCEDURE — 99213 OFFICE O/P EST LOW 20 MIN: CPT

## 2023-08-29 PROCEDURE — OTHER PRESCRIPTION: OTHER

## 2023-08-29 PROCEDURE — OTHER PRESCRIPTION MEDICATION MANAGEMENT: OTHER

## 2023-08-29 RX ORDER — VALACYCLOVIR 500 MG/1
TABLET, FILM COATED ORAL
Qty: 10 | Refills: 0 | Status: ERX | COMMUNITY
Start: 2023-08-29

## 2023-08-29 RX ORDER — KETOCONAZOLE 20 MG/G
CREAM TOPICAL
Qty: 15 | Refills: 0 | Status: ERX | COMMUNITY
Start: 2023-08-29

## 2023-08-29 ASSESSMENT — LOCATION ZONE DERM
LOCATION ZONE: ARM
LOCATION ZONE: EYELID
LOCATION ZONE: LIP
LOCATION ZONE: NECK

## 2023-08-29 ASSESSMENT — LOCATION DETAILED DESCRIPTION DERM
LOCATION DETAILED: RIGHT MEDIAL INFERIOR EYELID
LOCATION DETAILED: LEFT LATERAL INFERIOR PRESEPTAL REGION
LOCATION DETAILED: RIGHT LOWER CUTANEOUS LIP
LOCATION DETAILED: LEFT ANTECUBITAL SKIN
LOCATION DETAILED: LEFT INFERIOR POSTERIOR NECK
LOCATION DETAILED: LEFT LOWER CUTANEOUS LIP
LOCATION DETAILED: RIGHT ANTECUBITAL SKIN
LOCATION DETAILED: RIGHT INFERIOR POSTERIOR NECK
LOCATION DETAILED: LEFT LATERAL SUPERIOR EYELID
LOCATION DETAILED: RIGHT MEDIAL SUPERIOR EYELID

## 2023-08-29 ASSESSMENT — LOCATION SIMPLE DESCRIPTION DERM
LOCATION SIMPLE: RIGHT UPPER ARM
LOCATION SIMPLE: LEFT UPPER ARM
LOCATION SIMPLE: LEFT SUPERIOR EYELID
LOCATION SIMPLE: RIGHT LIP
LOCATION SIMPLE: POSTERIOR NECK
LOCATION SIMPLE: RIGHT INFERIOR EYELID
LOCATION SIMPLE: RIGHT SUPERIOR EYELID
LOCATION SIMPLE: LEFT LIP
LOCATION SIMPLE: LEFT INFERIOR EYELID

## 2023-08-29 NOTE — PROCEDURE: PRESCRIPTION MEDICATION MANAGEMENT
Initiate Treatment: TAC 0.1% cream BID Pt has at home, ketoconazole 2% cream BID
Detail Level: Zone
Render In Strict Bullet Format?: No

## 2023-08-31 ENCOUNTER — APPOINTMENT (OUTPATIENT)
Dept: PHYSICAL THERAPY | Age: 78
End: 2023-08-31
Attending: PHYSICIAN ASSISTANT
Payer: MEDICARE

## 2023-09-19 ENCOUNTER — TELEPHONE (OUTPATIENT)
Dept: INTERNAL MEDICINE CLINIC | Facility: CLINIC | Age: 78
End: 2023-09-19

## 2023-09-19 NOTE — TELEPHONE ENCOUNTER
Patient came into office with bill from [facility] is denying annual TB test.   She states she needs a letter of medical necessity per her work setting and that she needs this test annually. She has contacted [address], Medicare and [facility] billing department to resolve this. All have told the patient she needs a letter of Medical necessity for her TB test from the ordering provider. Please advise and contact patient.

## 2023-09-27 ENCOUNTER — LAB ENCOUNTER (OUTPATIENT)
Dept: LAB | Age: 78
End: 2023-09-27
Attending: NURSE PRACTITIONER
Payer: MEDICARE

## 2023-09-27 DIAGNOSIS — R63.4 LOSS OF WEIGHT: Primary | ICD-10-CM

## 2023-09-27 LAB
T4 FREE SERPL-MCNC: 1.2 NG/DL (ref 0.8–1.7)
TSI SER-ACNC: 0.9 MIU/ML (ref 0.36–3.74)

## 2023-09-27 PROCEDURE — 84443 ASSAY THYROID STIM HORMONE: CPT

## 2023-09-27 PROCEDURE — 84439 ASSAY OF FREE THYROXINE: CPT

## 2023-09-27 PROCEDURE — 36415 COLL VENOUS BLD VENIPUNCTURE: CPT

## 2023-10-09 ENCOUNTER — TELEPHONE (OUTPATIENT)
Dept: INTERNAL MEDICINE CLINIC | Facility: CLINIC | Age: 78
End: 2023-10-09

## 2023-10-09 DIAGNOSIS — E11.9 TYPE 2 DIABETES MELLITUS WITHOUT COMPLICATION, WITHOUT LONG-TERM CURRENT USE OF INSULIN (HCC): Primary | ICD-10-CM

## 2023-10-09 DIAGNOSIS — K51.019 CHRONIC ULCERATIVE ENTEROCOLITIS WITH COMPLICATION (HCC): ICD-10-CM

## 2023-10-09 DIAGNOSIS — E46 PROTEIN-CALORIE MALNUTRITION, UNSPECIFIED SEVERITY (HCC): ICD-10-CM

## 2023-10-09 NOTE — TELEPHONE ENCOUNTER
Placed diabetes nutrition management referral. Unable to obtain a specific provider's name who specializes in what the pt is looking for. [facility] sent to pt.

## 2023-10-09 NOTE — TELEPHONE ENCOUNTER
Patient is calling to request a recommendation for Recommendation for dietitian that specialized in [address].

## 2023-10-09 NOTE — TELEPHONE ENCOUNTER
Spoke to pt for additional information. Pt stated her GI doctor recommended she see a dietician due to her DM, poor absorption, and GI symptoms. The dietician recommended by the GI was not a good fit. Pt also asked for another copy of her TB lab that was done 11/22/22 and a copy of the letter from Dr. Wang [facility] dated 9/19/23. Pt thought this was all taken care of, but she is going to have to start an appeal with Medicare about them not paying for this TB test. Copies placed at  in brown accordion file. Pt plans to pick them up tomorrow.

## 2023-11-09 ENCOUNTER — TELEPHONE (OUTPATIENT)
Dept: INTERNAL MEDICINE CLINIC | Facility: CLINIC | Age: 78
End: 2023-11-09

## 2023-11-09 DIAGNOSIS — Z11.1 SCREENING FOR TUBERCULOSIS: Primary | ICD-10-CM

## 2023-11-09 NOTE — TELEPHONE ENCOUNTER
Spoke to pt, answered TB risk assessment form, entered into [employer]. Order placed for TB Quantiferon Gold, per protocol.

## 2023-11-15 ENCOUNTER — LAB ENCOUNTER (OUTPATIENT)
Dept: LAB | Age: 78
End: 2023-11-15
Attending: INTERNAL MEDICINE
Payer: MEDICARE

## 2023-11-15 DIAGNOSIS — Z11.1 SCREENING FOR TUBERCULOSIS: ICD-10-CM

## 2023-11-15 PROCEDURE — 86480 TB TEST CELL IMMUN MEASURE: CPT

## 2023-11-15 PROCEDURE — 36415 COLL VENOUS BLD VENIPUNCTURE: CPT

## 2023-11-16 LAB
M TB IFN-G CD4+ T-CELLS BLD-ACNC: 0.17 IU/ML
M TB TUBERC IFN-G BLD QL: NEGATIVE
M TB TUBERC IGNF/MITOGEN IGNF CONTROL: >10 IU/ML
QFT TB1 AG MINUS NIL: -0.02 IU/ML
QFT TB2 AG MINUS NIL: -0.05 IU/ML

## 2023-11-29 ENCOUNTER — HOSPITAL ENCOUNTER (OUTPATIENT)
Dept: BONE DENSITY | Age: 78
Discharge: HOME | End: 2023-11-29
Attending: NURSE PRACTITIONER
Payer: MEDICARE

## 2023-11-29 DIAGNOSIS — M85.89 OTHER SPECIFIED DISORDERS OF BONE DENSITY AND STRUCTURE, MULTIPLE SITES: ICD-10-CM

## 2023-11-29 PROCEDURE — 77080 DXA BONE DENSITY AXIAL: CPT | Performed by: NURSE PRACTITIONER

## 2023-12-19 ENCOUNTER — OFFICE VISIT (OUTPATIENT)
Dept: INTERNAL MEDICINE CLINIC | Facility: CLINIC | Age: 78
End: 2023-12-19
Payer: MEDICARE

## 2023-12-19 VITALS
HEART RATE: 72 BPM | SYSTOLIC BLOOD PRESSURE: 132 MMHG | WEIGHT: 102 LBS | BODY MASS INDEX: 18.77 KG/M2 | DIASTOLIC BLOOD PRESSURE: 62 MMHG | RESPIRATION RATE: 16 BRPM | TEMPERATURE: 98 F | HEIGHT: 62 IN | OXYGEN SATURATION: 97 %

## 2023-12-19 DIAGNOSIS — I25.10 ATHEROSCLEROSIS OF NATIVE CORONARY ARTERY OF NATIVE HEART WITHOUT ANGINA PECTORIS: Chronic | ICD-10-CM

## 2023-12-19 DIAGNOSIS — Z00.00 ROUTINE GENERAL MEDICAL EXAMINATION AT A HEALTH CARE FACILITY: Primary | ICD-10-CM

## 2023-12-19 DIAGNOSIS — Z12.31 ENCOUNTER FOR SCREENING MAMMOGRAM FOR MALIGNANT NEOPLASM OF BREAST: ICD-10-CM

## 2023-12-19 DIAGNOSIS — E78.00 PURE HYPERCHOLESTEROLEMIA: Chronic | ICD-10-CM

## 2023-12-19 DIAGNOSIS — E11.9 TYPE 2 DIABETES MELLITUS WITHOUT COMPLICATION, WITHOUT LONG-TERM CURRENT USE OF INSULIN (HCC): Chronic | ICD-10-CM

## 2023-12-19 DIAGNOSIS — M81.0 SENILE OSTEOPOROSIS: Chronic | ICD-10-CM

## 2023-12-19 PROBLEM — M81.8 OTHER OSTEOPOROSIS WITHOUT CURRENT PATHOLOGICAL FRACTURE: Chronic | Status: ACTIVE | Noted: 2022-02-21

## 2023-12-19 PROCEDURE — G0439 PPPS, SUBSEQ VISIT: HCPCS | Performed by: INTERNAL MEDICINE

## 2023-12-19 PROCEDURE — 1126F AMNT PAIN NOTED NONE PRSNT: CPT | Performed by: INTERNAL MEDICINE

## 2023-12-19 RX ORDER — PREDNISONE 20 MG/1
20 TABLET ORAL DAILY
COMMUNITY
End: 2023-12-19 | Stop reason: ALTCHOICE

## 2023-12-19 RX ORDER — LEVOTHYROXINE SODIUM 0.1 MG/1
100 TABLET ORAL DAILY
COMMUNITY
Start: 2023-09-17 | End: 2023-12-19

## 2023-12-19 RX ORDER — TRIAMCINOLONE ACETONIDE 1 MG/G
CREAM TOPICAL 2 TIMES DAILY
COMMUNITY
Start: 2023-08-22

## 2023-12-19 RX ORDER — ZOLPIDEM TARTRATE 10 MG/1
10 TABLET ORAL NIGHTLY PRN
COMMUNITY
End: 2023-12-19

## 2023-12-19 RX ORDER — NITROGLYCERIN 4 MG/G
1.5 OINTMENT RECTAL
COMMUNITY

## 2023-12-19 RX ORDER — KETOCONAZOLE 20 MG/G
1 CREAM TOPICAL 2 TIMES DAILY
COMMUNITY

## 2023-12-19 RX ORDER — METRONIDAZOLE 500 MG/1
1 TABLET ORAL 3 TIMES DAILY
COMMUNITY
End: 2023-12-19 | Stop reason: ALTCHOICE

## 2023-12-19 RX ORDER — PREGABALIN 75 MG/1
75 CAPSULE ORAL 2 TIMES DAILY
COMMUNITY
End: 2023-12-19

## 2023-12-20 ENCOUNTER — LAB ENCOUNTER (OUTPATIENT)
Dept: LAB | Age: 78
End: 2023-12-20
Attending: INTERNAL MEDICINE
Payer: MEDICARE

## 2023-12-20 ENCOUNTER — TELEPHONE (OUTPATIENT)
Dept: INTERNAL MEDICINE CLINIC | Facility: CLINIC | Age: 78
End: 2023-12-20

## 2023-12-20 DIAGNOSIS — E11.9 TYPE 2 DIABETES MELLITUS WITHOUT COMPLICATION, WITHOUT LONG-TERM CURRENT USE OF INSULIN (HCC): Chronic | ICD-10-CM

## 2023-12-20 DIAGNOSIS — M81.0 SENILE OSTEOPOROSIS: ICD-10-CM

## 2023-12-20 DIAGNOSIS — Z00.00 ROUTINE GENERAL MEDICAL EXAMINATION AT A HEALTH CARE FACILITY: ICD-10-CM

## 2023-12-20 DIAGNOSIS — M65.30 TRIGGER FINGER OF RIGHT HAND, UNSPECIFIED FINGER: Primary | ICD-10-CM

## 2023-12-20 DIAGNOSIS — E78.00 PURE HYPERCHOLESTEROLEMIA: Chronic | ICD-10-CM

## 2023-12-20 LAB
ALBUMIN SERPL-MCNC: 2.7 G/DL (ref 3.4–5)
ALBUMIN/GLOB SERPL: 0.6 {RATIO} (ref 1–2)
ALP LIVER SERPL-CCNC: 65 U/L
ALT SERPL-CCNC: 16 U/L
ANION GAP SERPL CALC-SCNC: 8 MMOL/L (ref 0–18)
AST SERPL-CCNC: 20 U/L (ref 15–37)
BASOPHILS # BLD AUTO: 0.03 X10(3) UL (ref 0–0.2)
BASOPHILS NFR BLD AUTO: 0.6 %
BILIRUB SERPL-MCNC: 0.6 MG/DL (ref 0.1–2)
BILIRUB UR QL STRIP.AUTO: NEGATIVE
BUN BLD-MCNC: 12 MG/DL (ref 9–23)
CALCIUM BLD-MCNC: 8.9 MG/DL (ref 8.5–10.1)
CHLORIDE SERPL-SCNC: 101 MMOL/L (ref 98–112)
CHOLEST SERPL-MCNC: 104 MG/DL (ref ?–200)
CLARITY UR REFRACT.AUTO: CLEAR
CO2 SERPL-SCNC: 23 MMOL/L (ref 21–32)
CREAT BLD-MCNC: 1.07 MG/DL
CREAT UR-SCNC: 97.2 MG/DL
EGFRCR SERPLBLD CKD-EPI 2021: 53 ML/MIN/1.73M2 (ref 60–?)
EOSINOPHIL # BLD AUTO: 0.24 X10(3) UL (ref 0–0.7)
EOSINOPHIL NFR BLD AUTO: 4.4 %
ERYTHROCYTE [DISTWIDTH] IN BLOOD BY AUTOMATED COUNT: 17.3 %
EST. AVERAGE GLUCOSE BLD GHB EST-MCNC: 183 MG/DL (ref 68–126)
FASTING PATIENT LIPID ANSWER: YES
FASTING STATUS PATIENT QL REPORTED: YES
GLOBULIN PLAS-MCNC: 4.5 G/DL (ref 2.8–4.4)
GLUCOSE BLD-MCNC: 174 MG/DL (ref 70–99)
GLUCOSE UR STRIP.AUTO-MCNC: NORMAL MG/DL
HBA1C MFR BLD: 8 % (ref ?–5.7)
HCT VFR BLD AUTO: 25.1 %
HDLC SERPL-MCNC: 62 MG/DL (ref 40–59)
HGB BLD-MCNC: 7.3 G/DL
HYALINE CASTS #/AREA URNS AUTO: PRESENT /LPF
IMM GRANULOCYTES # BLD AUTO: 0.02 X10(3) UL (ref 0–1)
IMM GRANULOCYTES NFR BLD: 0.4 %
KETONES UR STRIP.AUTO-MCNC: NEGATIVE MG/DL
LDLC SERPL CALC-MCNC: 27 MG/DL (ref ?–100)
LEUKOCYTE ESTERASE UR QL STRIP.AUTO: 75
LYMPHOCYTES # BLD AUTO: 2.24 X10(3) UL (ref 1–4)
LYMPHOCYTES NFR BLD AUTO: 41.4 %
MCH RBC QN AUTO: 19.9 PG (ref 26–34)
MCHC RBC AUTO-ENTMCNC: 29.1 G/DL (ref 31–37)
MCV RBC AUTO: 68.6 FL
MICROALBUMIN UR-MCNC: 5.38 MG/DL
MICROALBUMIN/CREAT 24H UR-RTO: 55.3 UG/MG (ref ?–30)
MONOCYTES # BLD AUTO: 0.61 X10(3) UL (ref 0.1–1)
MONOCYTES NFR BLD AUTO: 11.3 %
NEUTROPHILS # BLD AUTO: 2.27 X10 (3) UL (ref 1.5–7.7)
NEUTROPHILS # BLD AUTO: 2.27 X10(3) UL (ref 1.5–7.7)
NEUTROPHILS NFR BLD AUTO: 41.9 %
NITRITE UR QL STRIP.AUTO: NEGATIVE
NONHDLC SERPL-MCNC: 42 MG/DL (ref ?–130)
OSMOLALITY SERPL CALC.SUM OF ELEC: 278 MOSM/KG (ref 275–295)
PH UR STRIP.AUTO: 5.5 [PH] (ref 5–8)
PLATELET # BLD AUTO: 436 10(3)UL (ref 150–450)
POTASSIUM SERPL-SCNC: 4.3 MMOL/L (ref 3.5–5.1)
PROT SERPL-MCNC: 7.2 G/DL (ref 6.4–8.2)
RBC # BLD AUTO: 3.66 X10(6)UL
RBC UR QL AUTO: NEGATIVE
SODIUM SERPL-SCNC: 132 MMOL/L (ref 136–145)
SP GR UR STRIP.AUTO: 1.01 (ref 1–1.03)
T4 SERPL-MCNC: 11.4 UG/DL
TRIGL SERPL-MCNC: 71 MG/DL (ref 30–149)
TSI SER-ACNC: 1.49 MIU/ML (ref 0.36–3.74)
UROBILINOGEN UR STRIP.AUTO-MCNC: NORMAL MG/DL
VIT D+METAB SERPL-MCNC: 26 NG/ML (ref 30–100)
VLDLC SERPL CALC-MCNC: 9 MG/DL (ref 0–30)
WBC # BLD AUTO: 5.4 X10(3) UL (ref 4–11)

## 2023-12-20 PROCEDURE — 82306 VITAMIN D 25 HYDROXY: CPT

## 2023-12-20 PROCEDURE — 85025 COMPLETE CBC W/AUTO DIFF WBC: CPT

## 2023-12-20 PROCEDURE — 84443 ASSAY THYROID STIM HORMONE: CPT

## 2023-12-20 PROCEDURE — 84436 ASSAY OF TOTAL THYROXINE: CPT

## 2023-12-20 PROCEDURE — 83036 HEMOGLOBIN GLYCOSYLATED A1C: CPT

## 2023-12-20 PROCEDURE — 36415 COLL VENOUS BLD VENIPUNCTURE: CPT

## 2023-12-20 PROCEDURE — 80053 COMPREHEN METABOLIC PANEL: CPT

## 2023-12-20 PROCEDURE — 80061 LIPID PANEL: CPT

## 2023-12-20 PROCEDURE — 82043 UR ALBUMIN QUANTITATIVE: CPT

## 2023-12-20 PROCEDURE — 81001 URINALYSIS AUTO W/SCOPE: CPT

## 2023-12-20 PROCEDURE — 82570 ASSAY OF URINE CREATININE: CPT

## 2023-12-20 NOTE — TELEPHONE ENCOUNTER
Patient discussed seeing specialist for hand/finger pain, needs name of provider Dr. Chaparro wants her to see

## 2023-12-21 ENCOUNTER — TELEPHONE (OUTPATIENT)
Dept: INTERNAL MEDICINE CLINIC | Facility: CLINIC | Age: 78
End: 2023-12-21

## 2023-12-21 NOTE — TELEPHONE ENCOUNTER
Patient called to update that she is going to [facility] ER. She is experiencing black stool, feeling very dizzy, low hemoglobin level and fainting. Please advise.

## 2024-01-08 ENCOUNTER — TELEPHONE (OUTPATIENT)
Dept: INTERNAL MEDICINE CLINIC | Facility: CLINIC | Age: 79
End: 2024-01-08

## 2024-01-08 NOTE — TELEPHONE ENCOUNTER
Please write letter of medical necessity to Medicare for TB test to be done in office per .   Patient gets this done every year and is required by employer. 11/22/2022 d.o.s. was denied.      informed patient to contact Colleen regarding this.  Please advise.

## 2024-01-09 NOTE — TELEPHONE ENCOUNTER
I spoke with Papo the representative at [employer] and ask that they resubmit  the bill with diagnosis Z11.1. I will also Mail letter to Medicare per patient's request.     I explained to patient that this is not a guarantee that Medicare will cover the test.

## 2024-01-29 NOTE — TELEPHONE ENCOUNTER
Already d/w pt   Time spent 15 mins  Has se with metformin  All related to epidural inj  Has another one lined up on 1/21  Cannot afford expensive meds  Will send glipizide and refrain from insulin
Pt called stating she went back on Metformin and she is having side effects. Pt states when she was originally on Metformin she had bad side effects and then she got taken off of it.  After she was off of the rx, VM wanted her to start taking it again be
See te
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

## 2024-03-04 ENCOUNTER — LAB ENCOUNTER (OUTPATIENT)
Dept: LAB | Age: 79
End: 2024-03-04
Attending: INTERNAL MEDICINE
Payer: MEDICARE

## 2024-03-04 DIAGNOSIS — K91.89: ICD-10-CM

## 2024-03-04 DIAGNOSIS — T18.2XXA POSTGASTRECTOMY PHYTOBEZOAR: ICD-10-CM

## 2024-03-04 DIAGNOSIS — K91.850 INFLAMMATION OF INTERNAL ILEOANAL POUCH (HCC): Primary | ICD-10-CM

## 2024-03-04 DIAGNOSIS — K91.850 POUCHITIS (HCC): ICD-10-CM

## 2024-03-04 DIAGNOSIS — K91.89 INFLAMMATION OF INTERNAL ILEOANAL POUCH (HCC): Primary | ICD-10-CM
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
## 2024-03-04 DIAGNOSIS — K91.89 POSTGASTRECTOMY PHYTOBEZOAR: ICD-10-CM

## 2024-03-04 DIAGNOSIS — K91.850: ICD-10-CM

## 2024-03-04 DIAGNOSIS — K51.018 ULCERATIVE PANCOLITIS WITH OTHER COMPLICATION (HCC): ICD-10-CM

## 2024-03-04 LAB
ALBUMIN SERPL-MCNC: 2.9 G/DL (ref 3.4–5)
ALBUMIN/GLOB SERPL: 0.6 {RATIO} (ref 1–2)
ALP LIVER SERPL-CCNC: 76 U/L
ALT SERPL-CCNC: 18 U/L
ANION GAP SERPL CALC-SCNC: 1 MMOL/L (ref 0–18)
AST SERPL-CCNC: 18 U/L (ref 15–37)
BILIRUB SERPL-MCNC: 0.6 MG/DL (ref 0.1–2)
BUN BLD-MCNC: 18 MG/DL (ref 9–23)
CALCIUM BLD-MCNC: 9.4 MG/DL (ref 8.5–10.1)
CHLORIDE SERPL-SCNC: 102 MMOL/L (ref 98–112)
CO2 SERPL-SCNC: 27 MMOL/L (ref 21–32)
CREAT BLD-MCNC: 1.08 MG/DL
CRP SERPL-MCNC: 0.66 MG/DL (ref ?–0.3)
EGFRCR SERPLBLD CKD-EPI 2021: 53 ML/MIN/1.73M2 (ref 60–?)
ERYTHROCYTE [DISTWIDTH] IN BLOOD BY AUTOMATED COUNT: 19.5 %
FASTING STATUS PATIENT QL REPORTED: NO
GLOBULIN PLAS-MCNC: 4.7 G/DL (ref 2.8–4.4)
GLUCOSE BLD-MCNC: 184 MG/DL (ref 70–99)
HBV CORE AB SERPL QL IA: NONREACTIVE
HBV SURFACE AB SER QL: NONREACTIVE
HBV SURFACE AB SERPL IA-ACNC: 3.71 MIU/ML
HBV SURFACE AG SER-ACNC: <0.1 [IU]/L
HBV SURFACE AG SERPL QL IA: NONREACTIVE
HCT VFR BLD AUTO: 33.3 %
HGB BLD-MCNC: 9.9 G/DL
MCH RBC QN AUTO: 23.7 PG (ref 26–34)
MCHC RBC AUTO-ENTMCNC: 29.7 G/DL (ref 31–37)
MCV RBC AUTO: 79.7 FL
OSMOLALITY SERPL CALC.SUM OF ELEC: 277 MOSM/KG (ref 275–295)
PLATELET # BLD AUTO: 459 10(3)UL (ref 150–450)
POTASSIUM SERPL-SCNC: 4.6 MMOL/L (ref 3.5–5.1)
PROT SERPL-MCNC: 7.6 G/DL (ref 6.4–8.2)
RBC # BLD AUTO: 4.18 X10(6)UL
SODIUM SERPL-SCNC: 130 MMOL/L (ref 136–145)
WBC # BLD AUTO: 5.7 X10(3) UL (ref 4–11)

## 2024-03-04 PROCEDURE — 36415 COLL VENOUS BLD VENIPUNCTURE: CPT

## 2024-03-04 PROCEDURE — 87340 HEPATITIS B SURFACE AG IA: CPT

## 2024-03-04 PROCEDURE — 86704 HEP B CORE ANTIBODY TOTAL: CPT

## 2024-03-04 PROCEDURE — 80053 COMPREHEN METABOLIC PANEL: CPT

## 2024-03-04 PROCEDURE — 86140 C-REACTIVE PROTEIN: CPT

## 2024-03-04 PROCEDURE — 86480 TB TEST CELL IMMUN MEASURE: CPT

## 2024-03-04 PROCEDURE — 86706 HEP B SURFACE ANTIBODY: CPT

## 2024-03-04 PROCEDURE — 85027 COMPLETE CBC AUTOMATED: CPT

## 2024-03-06 LAB
M TB IFN-G CD4+ T-CELLS BLD-ACNC: 0.07 IU/ML
M TB TUBERC IFN-G BLD QL: NEGATIVE
M TB TUBERC IGNF/MITOGEN IGNF CONTROL: >10 IU/ML
QFT TB1 AG MINUS NIL: 0.01 IU/ML
QFT TB2 AG MINUS NIL: 0 IU/ML

## 2024-03-07 ENCOUNTER — TELEPHONE (OUTPATIENT)
Dept: INTERNAL MEDICINE CLINIC | Facility: CLINIC | Age: 79
End: 2024-03-07

## 2024-03-07 DIAGNOSIS — Z23 VACCINE FOR VIRAL HEPATITIS: Primary | ICD-10-CM

## 2024-03-07 NOTE — TELEPHONE ENCOUNTER
VM - do you rec Twinrix for this pt? Please advise, ty!    RN ran update through [facility] Immunization Registry - there are no records of previous vaccination for Hep A or Hep B.

## 2024-03-07 NOTE — TELEPHONE ENCOUNTER
Spoke to pt, gave message from .     Pt does not want to repeat BMP, says she is too busy tomorrow (Friday) and has a procedure next week, Friday 3/15).     RN offered telephone visit for tomorrow, pt also declined.    Pt said Dr. Sanchez did not feel any treatment for the low sodium was needed.     RN encouraged pt to call office back if she is not feeling well so doctor has a dedicated time slot to address her issues. Pt v/u.     RN discussed NV on Monday for Hep A/B vaccine. Informed pt that it is her choice to have Hep B only, or the combo Hep A/B - pt stated she will stay with Twinrix.

## 2024-03-07 NOTE — TELEPHONE ENCOUNTER
VM - pt now asks that you review her labs, sodium was low. Please advise, ty!    Spoke to pt, she is willing to get Twinrix vaccine but does not know if she really needs the Hep A vaccine. Pt does not know if she has been vaccinated for either Hep A or Hep B, when she was a child, she lived in [place].     Pt scheduled NV    Future Appointments   Date Time Provider Department Center   3/11/2024 11:45 AM [facility] 08 NURSE [facility] 8 [facility] [place]   6/6/2024  3:40 PM Reese Sanchez MD IN [facility] SUB GI     Pt then asked  to review her labs from 3/4/24 (Dr. Sanchez at [facility]). Pt's sodium is low and she was told to call PCP:    Sodium  136 - 145 mmol/L 130 Low      RN informed pt that we will have to wait for VM to review the labs and call her back. Pt v/u.     RN attempted to call Dr. Sanchez's office and LMTCB; left direct cb #    RN consulted with VM face-to-face, there is no harm in receiving the Twinrix vaccine, even if pt is immune to Hep A. Choice is up to the pt.     Order placed for Twinrix dose #1.

## 2024-03-07 NOTE — TELEPHONE ENCOUNTER
Patient called the office stating that she was recommended by Dr Reese Sanchez that she get a hepatitis vaccine, but she did not specify whether it was for A or B. Please call back to clarify with patient and advise.

## 2024-03-11 ENCOUNTER — NURSE ONLY (OUTPATIENT)
Dept: INTERNAL MEDICINE CLINIC | Facility: CLINIC | Age: 79
End: 2024-03-11
Payer: MEDICARE

## 2024-03-11 ENCOUNTER — TELEPHONE (OUTPATIENT)
Dept: INTERNAL MEDICINE CLINIC | Facility: CLINIC | Age: 79
End: 2024-03-11

## 2024-03-11 NOTE — TELEPHONE ENCOUNTER
Fariba mccabe NV today, pt said she wants to change her Prolia injections from [facility] to [facility] 8. Dr. Chaparro has given his okay.     Pt says her next dose is due 3/19/24, can we please start the PA process? Ty!

## 2024-03-12 ENCOUNTER — TELEPHONE (OUTPATIENT)
Dept: INTERNAL MEDICINE CLINIC | Facility: CLINIC | Age: 79
End: 2024-03-12

## 2024-03-12 DIAGNOSIS — M81.0 SENILE OSTEOPOROSIS: Primary | Chronic | ICD-10-CM

## 2024-03-15 NOTE — TELEPHONE ENCOUNTER
RN was notified that pt called the office to check the status of her Prolia prior authorization. Please advise pt when approved. Ty!

## 2024-03-15 NOTE — TELEPHONE ENCOUNTER
Incoming summary of benefits from [employer] portal.    Last prolia: New Start  Last bone density: 11-29-23  Last lab work (BMP, albumin, phosphorus, magnesium, vitamin D): Pending labs    Insurance (primary and secondary): Medicare/ [employer]  Coverage available: Yes  Prior authorization needed (Y/N): No      Copy of summary of benefits from [employer] sent to scan and placed in Prolia folder.

## 2024-03-18 NOTE — TELEPHONE ENCOUNTER
States she does not want to get labs drawn every 6 months, states other office where she gets Prolia drawn does not have her doing labs every 6 months. Explained that it is different everywhere as we require lab work every 6 months prior to getting shot.   Pt said she will get it done at the other office instead.   Pt asked to not cancel appt just yet, wants to talk to other office first to let them know she will be getting it through them and will call us back.

## 2024-03-27 ENCOUNTER — TELEPHONE (OUTPATIENT)
Dept: HEMATOLOGY/ONCOLOGY | Facility: HOSPITAL | Age: 79
End: 2024-03-27

## 2024-03-27 NOTE — TELEPHONE ENCOUNTER
Patient called to schedule an infusion.  I advised the paperwork from Dr Sanchez was sent back for a prior auth and treatment plan.

## 2024-04-01 ENCOUNTER — TELEPHONE (OUTPATIENT)
Dept: INTERNAL MEDICINE CLINIC | Facility: CLINIC | Age: 79
End: 2024-04-01

## 2024-04-01 NOTE — TELEPHONE ENCOUNTER
Front office:    Due to short-staffing, please call patient and schedule nurse visit for tomorrow.

## 2024-04-01 NOTE — TELEPHONE ENCOUNTER
Pt is calling to request a refill onGlucose Blood (FREESTYLE LITE TEST) In Vitro Strip - called into the [facility]....    Pt wanted it called in right away, I explained to her that we are short staffed and it may not be called in until the end of the day today or tomorrow. She verbalized understanding.

## 2024-04-01 NOTE — TELEPHONE ENCOUNTER
Pt would like to know if she can come in today so a nurse can teach her how to use a glucose meter. Pt would like a call asap and today.

## 2024-04-02 RX ORDER — BLOOD-GLUCOSE METER
KIT MISCELLANEOUS
Qty: 100 STRIP | Refills: 0 | Status: SHIPPED | OUTPATIENT
Start: 2024-04-02 | End: 2025-04-02

## 2024-04-05 ENCOUNTER — TELEPHONE (OUTPATIENT)
Dept: INTERNAL MEDICINE CLINIC | Facility: CLINIC | Age: 79
End: 2024-04-05

## 2024-04-05 ENCOUNTER — OFFICE VISIT (OUTPATIENT)
Dept: HEMATOLOGY/ONCOLOGY | Facility: HOSPITAL | Age: 79
End: 2024-04-05
Attending: INTERNAL MEDICINE
Payer: MEDICARE

## 2024-04-05 VITALS
SYSTOLIC BLOOD PRESSURE: 148 MMHG | WEIGHT: 99 LBS | OXYGEN SATURATION: 98 % | HEIGHT: 62.01 IN | RESPIRATION RATE: 16 BRPM | HEART RATE: 71 BPM | TEMPERATURE: 97 F | BODY MASS INDEX: 17.99 KG/M2 | DIASTOLIC BLOOD PRESSURE: 71 MMHG

## 2024-04-05 DIAGNOSIS — K91.850 POUCHITIS (HCC): Primary | ICD-10-CM

## 2024-04-05 DIAGNOSIS — Z23 VACCINE FOR VIRAL HEPATITIS: Primary | ICD-10-CM

## 2024-04-05 PROCEDURE — 96365 THER/PROPH/DIAG IV INF INIT: CPT

## 2024-04-05 NOTE — TELEPHONE ENCOUNTER
Pt called back stating that she spoke w/her insurance company representative for medicare and they informed her that they paid for her last Twinrix #1 vaccination that was on 3/11/24 as a NV.    Pt scheduled the next 2 Twinrix appts    FYI /clinical staff-Make sure pt signs ABN during NV's!

## 2024-04-05 NOTE — PROGRESS NOTES
Pt here for Entyvio . Pt denies any issues or concerns.      Ordering MD: Dr. Sanchez  Order Exp: 2/21/25     Pt tolerated infusion without difficulty or complaint. Reviewed next apt date/time: 4/18 1:30pm      Education Record  Learner:  Patient  Disease / Diagnosis: Pouchitis  Barriers / Limitations:  None  Method:  Brief focused and Reinforcement  General Topics:  Plan of care reviewed  Outcome:  Shows understanding

## 2024-04-05 NOTE — TELEPHONE ENCOUNTER
Pt called to schedule for her next series (series of 3) Hep A/B vaccinations     Pt completed Twinrix #1 on 3/11/24 during NV    Pt called to schedule next series #2 of Twinrix    Informed pt that Med B insurances have to go through Pharmacy for vaccinations     Pt stated that they were not aware about her having to go through the Pharmacy    Colleen- Please advise if ok to schedule as NV or if pt should go to Pharmacy?    Future Appointments   Date Time Provider Department Center   4/5/2024  2:30 PM [facility] TX RN5 [facility]   6/6/2024  3:40 PM Reese Sanchez MD [facility]

## 2024-04-18 ENCOUNTER — OFFICE VISIT (OUTPATIENT)
Dept: HEMATOLOGY/ONCOLOGY | Facility: HOSPITAL | Age: 79
End: 2024-04-18
Attending: INTERNAL MEDICINE
Payer: MEDICARE

## 2024-04-18 VITALS
DIASTOLIC BLOOD PRESSURE: 85 MMHG | HEIGHT: 62.01 IN | WEIGHT: 102 LBS | TEMPERATURE: 97 F | BODY MASS INDEX: 18.53 KG/M2 | OXYGEN SATURATION: 100 % | SYSTOLIC BLOOD PRESSURE: 146 MMHG | HEART RATE: 80 BPM

## 2024-04-18 DIAGNOSIS — K91.850 POUCHITIS (HCC): Primary | ICD-10-CM

## 2024-04-18 PROCEDURE — 96365 THER/PROPH/DIAG IV INF INIT: CPT

## 2024-04-18 NOTE — PROGRESS NOTES
Education Record    Learner:  Patient    Disease / Diagnosis:Pouchitis     Barriers / Limitations:  None   Comments:    Method:  Brief focused   Comments:    General Topics:  Diet, Infection, Medication, Pain, Precautions, Procedure, Side effects and symptom management, Plan of care reviewed, and Fall risk and prevention   Comments:    Outcome:  Shows understanding   Comments:    Patient here for her Entyvio infusion. She wanted medical assistant to take her BP standing and sitting. When I asked patient why is she feeling any symptoms she replied\" No reason , just wanted to know because when I had dark stools in December my BP were different \"...  Patient is orthostatic BP+ HR sitting =146/85;80 and standing 109/72;83  Patient states \" bright, red spotting last night. Not a lot in quantity. Denies any dizziness or abdominal pain at this time.    GI called and obtained next plan of care via telephone order for extra 1 liter of Nacl today and proceed with Entyvio and then to ask patient to go to ER for further evaluation.   Patient presented with plan of care , which she refused \" I am working as language   interpretor and I have to be at the [facility] at 3 pm.\" ... notified   Patient ended up receiving only Entivio today and was advised that if she feels anything out of ordinary to seek nearby ER. Patient agreed with this

## 2024-04-19 ENCOUNTER — APPOINTMENT (OUTPATIENT)
Dept: HEMATOLOGY/ONCOLOGY | Facility: HOSPITAL | Age: 79
End: 2024-04-19
Attending: INTERNAL MEDICINE
Payer: MEDICARE

## 2024-04-29 ENCOUNTER — HOSPITAL ENCOUNTER (OUTPATIENT)
Dept: LAB | Facility: HOSPITAL | Age: 79
Discharge: HOME | End: 2024-04-29
Attending: INTERNAL MEDICINE
Payer: MEDICARE

## 2024-04-29 ENCOUNTER — TELEPHONE (OUTPATIENT)
Dept: INTERNAL MEDICINE CLINIC | Facility: CLINIC | Age: 79
End: 2024-04-29

## 2024-04-29 DIAGNOSIS — D50.9 IRON DEFICIENCY ANEMIA, UNSPECIFIED IRON DEFICIENCY ANEMIA TYPE: ICD-10-CM

## 2024-04-29 DIAGNOSIS — K92.1 BLOOD IN STOOL: ICD-10-CM

## 2024-04-29 LAB
ALBUMIN SERPL-MCNC: 2.9 G/DL (ref 3.4–5)
ALBUMIN/GLOB SERPL: 0.6 {RATIO} (ref 1–2)
ALP LIVER SERPL-CCNC: 81 U/L
ALT SERPL-CCNC: 15 U/L
ANION GAP SERPL CALC-SCNC: 4 MMOL/L (ref 0–18)
AST SERPL-CCNC: 19 U/L (ref 15–37)
BILIRUB SERPL-MCNC: 0.6 MG/DL (ref 0.1–2)
BUN BLD-MCNC: 21 MG/DL (ref 9–23)
CALCIUM BLD-MCNC: 8.8 MG/DL (ref 8.5–10.1)
CHLORIDE SERPL-SCNC: 106 MMOL/L (ref 98–112)
CO2 SERPL-SCNC: 24 MMOL/L (ref 21–32)
CREAT BLD-MCNC: 1.13 MG/DL
CRP SERPL-MCNC: <0.29 MG/DL (ref ?–0.3)
EGFRCR SERPLBLD CKD-EPI 2021: 50 ML/MIN/1.73M2 (ref 60–?)
ERYTHROCYTE [DISTWIDTH] IN BLOOD BY AUTOMATED COUNT: 15.2 %
FASTING STATUS PATIENT QL REPORTED: NO
GLOBULIN PLAS-MCNC: 4.7 G/DL (ref 2.8–4.4)
GLUCOSE BLD-MCNC: 186 MG/DL (ref 70–99)
HCT VFR BLD AUTO: 28.4 %
HGB BLD-MCNC: 8.4 G/DL
MCH RBC QN AUTO: 23.5 PG (ref 26–34)
MCHC RBC AUTO-ENTMCNC: 29.6 G/DL (ref 31–37)
MCV RBC AUTO: 79.3 FL
OSMOLALITY SERPL CALC.SUM OF ELEC: 286 MOSM/KG (ref 275–295)
PLATELET # BLD AUTO: 368 10(3)UL (ref 150–450)
POTASSIUM SERPL-SCNC: 4.4 MMOL/L (ref 3.5–5.1)
PROT SERPL-MCNC: 7.6 G/DL (ref 6.4–8.2)
RBC # BLD AUTO: 3.58 X10(6)UL
SODIUM SERPL-SCNC: 134 MMOL/L (ref 136–145)
WBC # BLD AUTO: 4.3 X10(3) UL (ref 4–11)

## 2024-04-29 PROCEDURE — 80053 COMPREHEN METABOLIC PANEL: CPT | Performed by: INTERNAL MEDICINE

## 2024-04-29 PROCEDURE — 86140 C-REACTIVE PROTEIN: CPT | Performed by: INTERNAL MEDICINE

## 2024-04-29 PROCEDURE — 85027 COMPLETE CBC AUTOMATED: CPT | Performed by: INTERNAL MEDICINE

## 2024-04-29 PROCEDURE — 36415 COLL VENOUS BLD VENIPUNCTURE: CPT | Performed by: INTERNAL MEDICINE

## 2024-04-29 NOTE — TELEPHONE ENCOUNTER
Patient called to report that she has been bleeding onset for 1 week in her stools and has been experiencing dizziness, she is requesting to have her hemoglobin levels checked. Please call back at [phone] and advise.

## 2024-04-29 NOTE — TELEPHONE ENCOUNTER
Message from Dr. Sanchez was sent today at 3:46 PM that STAT lab orders were placed.     MCM sent back to pt.

## 2024-05-02 ENCOUNTER — HOSPITAL ENCOUNTER (OUTPATIENT)
Dept: LAB | Facility: HOSPITAL | Age: 79
Discharge: HOME | End: 2024-05-02
Attending: INTERNAL MEDICINE
Payer: MEDICARE

## 2024-05-02 DIAGNOSIS — K50.011 CROHN'S DISEASE OF SMALL INTESTINE WITH RECTAL BLEEDING (HCC): ICD-10-CM

## 2024-05-02 DIAGNOSIS — K91.850 POUCHITIS (HCC): ICD-10-CM

## 2024-05-02 PROBLEM — K29.70 GASTRITIS: Status: ACTIVE | Noted: 2023-12-21

## 2024-05-02 PROBLEM — R55 SYNCOPE: Status: ACTIVE | Noted: 2018-08-23

## 2024-05-02 PROBLEM — I10 ESSENTIAL HYPERTENSION: Status: ACTIVE | Noted: 2018-08-24

## 2024-05-02 LAB
DEPRECATED HBV CORE AB SER IA-ACNC: 7 NG/ML
ERYTHROCYTE [DISTWIDTH] IN BLOOD BY AUTOMATED COUNT: 15.4 %
FOLATE SERPL-MCNC: 38.2 NG/ML (ref 8.7–?)
HCT VFR BLD AUTO: 31.1 %
HGB BLD-MCNC: 9 G/DL
MCH RBC QN AUTO: 23 PG (ref 26–34)
MCHC RBC AUTO-ENTMCNC: 28.9 G/DL (ref 31–37)
MCV RBC AUTO: 79.5 FL
PLATELET # BLD AUTO: 344 10(3)UL (ref 150–450)
RBC # BLD AUTO: 3.91 X10(6)UL
VIT B12 SERPL-MCNC: 414 PG/ML (ref 193–986)
WBC # BLD AUTO: 4.4 X10(3) UL (ref 4–11)

## 2024-05-02 PROCEDURE — 82728 ASSAY OF FERRITIN: CPT

## 2024-05-02 PROCEDURE — 36415 COLL VENOUS BLD VENIPUNCTURE: CPT

## 2024-05-02 PROCEDURE — 82607 VITAMIN B-12: CPT

## 2024-05-02 PROCEDURE — 82746 ASSAY OF FOLIC ACID SERUM: CPT

## 2024-05-02 PROCEDURE — 85027 COMPLETE CBC AUTOMATED: CPT

## 2024-05-06 ENCOUNTER — TELEPHONE (OUTPATIENT)
Age: 79
End: 2024-05-06

## 2024-05-06 NOTE — TELEPHONE ENCOUNTER
New consult  TYRONE HINTON  : 10/15/1945  ph: [phone]  Dr. Sanchez     to    Dr. Harris  Dx: Iron deficiency anemia due to chronic blood loss   Thanks Lovely

## 2024-05-13 ENCOUNTER — HOSPITAL ENCOUNTER (OUTPATIENT)
Dept: LAB | Facility: HOSPITAL | Age: 79
Discharge: HOME | End: 2024-05-13
Attending: INTERNAL MEDICINE

## 2024-05-13 DIAGNOSIS — D50.0 IRON DEFICIENCY ANEMIA DUE TO CHRONIC BLOOD LOSS: ICD-10-CM

## 2024-05-13 LAB
ERYTHROCYTE [DISTWIDTH] IN BLOOD BY AUTOMATED COUNT: 14.9 %
HCT VFR BLD AUTO: 29.2 %
HGB BLD-MCNC: 8.4 G/DL
MCH RBC QN AUTO: 22.7 PG (ref 26–34)
MCHC RBC AUTO-ENTMCNC: 28.8 G/DL (ref 31–37)
MCV RBC AUTO: 78.9 FL
PLATELET # BLD AUTO: 385 10(3)UL (ref 150–450)
RBC # BLD AUTO: 3.7 X10(6)UL
WBC # BLD AUTO: 6.7 X10(3) UL (ref 4–11)

## 2024-05-13 PROCEDURE — 85027 COMPLETE CBC AUTOMATED: CPT

## 2024-05-13 PROCEDURE — 36415 COLL VENOUS BLD VENIPUNCTURE: CPT

## 2024-05-15 NOTE — PROGRESS NOTES
[facility] Note    Visit Diagnosis:  No diagnosis found.    History of Present Illness: 78F with a PMH of CAD, DVT, DM2, Osteoporosis Ulcerative colitis and pouchitis was referred by Dr. Chaparro for anemia.    -12/2020: She started to notice new weakness and diffuse itching. She met with a dermatologist and was noted to have new onset anemia. She states that she was noticing intermittent a mild hematochezia.     -3/4/21 to 3/6/21: She was admitted for symptomatic anemia. Her Hb trended down from 07/2020 (Hb 11.8) to 7.4 (03/03/21).  Her MCV was previously normal and is Now < 80. Retic Hb 18. Ferritin 7.3,  Fe sat 3. She also noticed 10 lb weight loss. She underwent and EGD/Flex sig--EGD essentially normal aside from a 3 cm hiatal hernia (non-bleeding), few antral erosions, Flex sig showed superficial ulcerations in J pouch--there was no obvious bleeding. Path did not show cancer. There was evidence of acute inflammation in ileum, anastomosis, J pouch and transition zone. During her admission, she was transfused 1 unit.     -3/11/21: I met with patient for first time and we discussed above. She notes that the most bothersome thing to her his diffuse itching. She has also noticed 8-10 lb weight loss in the past year which has been unintentional. She has noted intermittent hematochezia. No abdominal vaginal bleeding or hematuria. She denies fevers, chills or night sweats. No melena. Denies any pain. No smoking/etoh abuse/drugs. Used to work for [employer]. Son is anesthesiologist. In regards to her UC, she is not currently on any biologics, she was considering Entyvio. She is currently on PRN Mesalamine suppositories but these cause constipation. She does not take PO iron regularly.     -IV InFED 03/2021    -05/2021: Ferritin 91, HB 10.8  -12/2022: Ferritin 16, Hb 11.8  - 05/2024: Ferritin 7, Hb 8.4    Interval History: above  -She has not been seen since 2021  -Receives entyvio still  -Iron levels  are low again. Feels somewhat fatigued. Has been having blood in the stool. Repeat endoscopy is scheduled   -Has more itching when her Iron is low      Review of Systems: 12 Point ROS was completed and pertinent positives are in the HPI    Current Outpatient Medications on File Prior to Visit   Medication Sig Dispense Refill    Glucose Blood (FREESTYLE LITE TEST) In Vitro Strip Use as directed. 100 strip 0    Blood Glucose Monitoring Suppl (ONETOUCH VERIO REFLECT) w/Device Does not apply Kit Take 1 Bottle by mouth As Directed.      Lancets (ONETOUCH DELICA PLUS [id number]) Does not apply Misc 1 strip by In Vitro route daily.      zolpidem 10 MG Oral Tab Take 1 tablet (10 mg total) by mouth nightly as needed.      ketoconazole 2 % External Cream Apply 1 Application topically 2 (two) times daily.      nitroglycerin (RECTIV) 0.4 % Rectal Ointment 1.5 g.      glipiZIDE ER 5 MG Oral Tablet 24 Hr       pioglitazone 15 MG Oral Tab 1 tablet (15 mg total).      mesalamine 1.2 g Oral Tab EC Take 1 tablet (1.2 g total) by mouth 4 (four) times daily.      Ferrous Sulfate 325 (65 Fe) MG Oral Tab Take 0.2 tablets (65 mg total) by mouth As Directed.      Glucose Blood (FREESTYLE LITE TEST) In Vitro Strip Tests 1 x daily 100 strip 2    Pantoprazole Sodium 40 MG Oral Tab EC Take 1 tablet (40 mg total) by mouth daily. Before meal (Patient taking differently: Take 1 tablet (40 mg total) by mouth as needed. Before meal) 60 tablet 0    Multiple Vitamins-Minerals (TAB-A-SHLOMO) Oral Tab Take 1 tablet by mouth daily.      atorvastatin 40 MG Oral Tab Take 0.5 tablets (20 mg total) by mouth nightly.  6    atenolol 50 MG Oral Tab Take 0.5 tablets (25 mg total) by mouth daily.  1    Calcium Carb-Cholecalciferol (CALCIUM + D3) 600-200 MG-UNIT Oral Tab Take 1 tablet by mouth daily.      [] metRONIDAZOLE 500 MG Oral Tab Take 1 tablet (500 mg total) by mouth in the morning and 1 tablet (500 mg total) before bedtime. Do all this for 14 days.  28 tablet 0    triamcinolone 0.1 % External Cream Apply topically 2 (two) times daily.       Current Facility-Administered Medications on File Prior to Visit   Medication Dose Route Frequency Provider Last Rate Last Admin    [COMPLETED] vedolizumab (Entyvio) 300 mg in sodium chloride 0.9% 255 mL IVPB  300 mg Intravenous Once Reese Sanchez MD   Stopped at 04/18/24 1512    vedolizumab (Entyvio) 300 mg in sodium chloride 0.9% 254.8 mL IVPB  300 mg Intravenous Once Reese Sanchez MD         Past Medical History:    Blood in the stool    CAD (coronary artery disease)    Dr. Montero     Calculus of kidney    Constipation    Coronary atherosclerosis of native coronary artery    Displacement of lumbar intervertebral disc without myelopathy    Dizziness    DVT (deep venous thrombosis) ([facility])    Frequent use of laxatives    Gall stones    Hemorrhoids    High cholesterol    Hip fracture (HCC)    Irregular bowel habits    Kidney stones    Lumbar disc disease    Osteoporosis    Senile osteoporosis    Shortness of breath    Stented coronary artery    Stool incontinence    Stress fracture, left fibula, initial encounter for fracture    Ulcerative (chronic) enterocolitis ([facility])    Ulcerative colitis ([facility])    Dr. Pan    Weight loss     Past Surgical History:   Procedure Laterality Date    Angioplasty (coronary)      Colectomy      Fracture surgery Right     ankle    Lithotripsy      Lmbr epidural steroid inj, physiatry (internal)      Sigmoidoscopy,diagnostic       Social History     Socioeconomic History    Marital status:    Tobacco Use    Smoking status: Never    Smokeless tobacco: Never   Vaping Use    Vaping status: Never Used   Substance and Sexual Activity    Alcohol use: No     Alcohol/week: 0.0 standard drinks of alcohol    Drug use: No    Sexual activity: Never      Family History   Problem Relation Age of Onset    Heart Surgery Father         CABG    Diabetes Mother         Mellitus        Physical Exam  Height:  157.5 cm (5' 2.01\") (05/16 1405)  Weight: 46.4 kg (102 lb 6.4 oz) (05/16 1405)  BSA (Calculated - sq m): 1.44 sq meters (05/16 1405)  Pulse: 70 (05/16 1405)  BP: 150/67 (05/16 1405)  Temp: 97.6 °F (36.4 °C) (05/16 1405)  Do Not Use - Resp Rate: --  SpO2: 100 % (05/16 1405)     General: NAD, AOX3  HEENT: clear op, mmm, no jvd, no scleral icterus  CV: RR  Extremities: No edema   Lungs: no increased work of breathing  Abd: non distended   Neuro: CN: II-XII grossly intact      Results:  Lab Results   Component Value Date    WBC 6.7 05/13/2024    HGB 8.4 (L) 05/13/2024    HCT 29.2 (L) 05/13/2024    MCV 78.9 (L) 05/13/2024    .0 05/13/2024     Lab Results   Component Value Date     (L) 04/29/2024    K 4.4 04/29/2024    CO2 24.0 04/29/2024     04/29/2024    BUN 21 04/29/2024    ALB 2.9 (L) 04/29/2024       Radiology: Reviewed     Pathology:   3/5/21: EGD/Colon  A.  Duodenal biopsy:  -Fragments of small intestinal mucosa with no significant pathologic change.   -Negative for morphologic changes of celiac sprue.      B.  Gastric biopsy:  -Reactive gastropathy/erosive gastritis.  -Negative for Helicobacter pylori-like organisms     C.  Distal esophagus biopsy:  Fragments of junctional mucosa with mild reflux-like changes including chronic gastric carditis.  -Negative for intestinal metaplasia.     D.  Ileal biopsy:  -Patchy mild nonspecific lamina propria acute inflammation.     E.  Anastomosis biopsy:  -Fragments of small intestinal type mucosa with mild acute and chronic inflammation.     F.  J-pouch biopsy:  -Fragments of ileal mucosa with acute and chronic inflammation including cryptitis and gland architectural distortion consistent with pouchitis.     G.  Transition zone biopsy:  -Fragments of intestinal mucosa with ulceration with acute inflammatory exudate and granulation tissue formation        Assessment and Plan: 78F with a PMH of CAD, DVT, DM2, Osteoporosis Ulcerative colitis and pouchitis was  referred by Dr. Chaparro for anemia.    Iron Deficiency Anemia: Labs are consistent MARIANA with ferritin 7.3, Fe sat 3.   - Likely source of anemia is chronic GI Blood loss from active UC given active inflammation. She has follow up with GI for repeat endoscopy   - Recommend IV InFED. Risk and benefits discussed   - Repeat CBC and Fe studies in 3 months   - Ok to hold off on PO iron given issues with constipation   - We discussed that we offer PRBC transfusion for a Hb < 7    Dispo  IV Iron-prefers next Wednesday  Repeat labs in 3 months    [facility]

## 2024-05-16 ENCOUNTER — OFFICE VISIT (OUTPATIENT)
Dept: HEMATOLOGY/ONCOLOGY | Facility: HOSPITAL | Age: 79
End: 2024-05-16
Attending: INTERNAL MEDICINE
Payer: MEDICARE

## 2024-05-16 VITALS
DIASTOLIC BLOOD PRESSURE: 67 MMHG | SYSTOLIC BLOOD PRESSURE: 150 MMHG | TEMPERATURE: 98 F | HEIGHT: 62.01 IN | BODY MASS INDEX: 18.6 KG/M2 | WEIGHT: 102.38 LBS | OXYGEN SATURATION: 100 % | RESPIRATION RATE: 16 BRPM | HEART RATE: 70 BPM

## 2024-05-16 DIAGNOSIS — D50.0 IRON DEFICIENCY ANEMIA DUE TO CHRONIC BLOOD LOSS: ICD-10-CM

## 2024-05-16 DIAGNOSIS — K91.850 POUCHITIS (HCC): Primary | ICD-10-CM

## 2024-05-16 DIAGNOSIS — K51.019 CHRONIC ULCERATIVE ENTEROCOLITIS WITH COMPLICATION (HCC): Primary | ICD-10-CM

## 2024-05-16 PROCEDURE — 96365 THER/PROPH/DIAG IV INF INIT: CPT

## 2024-05-16 PROCEDURE — 99215 OFFICE O/P EST HI 40 MIN: CPT | Performed by: INTERNAL MEDICINE

## 2024-05-16 NOTE — PROGRESS NOTES
Education Record    Learner:  Patient    Disease / Diagnosis: Ulcerative enterocolitis    Barriers / Limitations:  None   Comments:    Method:  Brief focused   Comments:    General Topics:  Plan of care reviewed   Comments:    Outcome:  Shows understanding   Comments:

## 2024-05-16 NOTE — PROGRESS NOTES
Patient here as a re- consult. Energy levels are low. Experiencing more noticeable dyspnea and lightheadedness. Has skin itching. States she had the same itching last time when iron deficient. Will meet with dermatology early June. Will have scopes on 6/24/24 with GI.

## 2024-05-17 ENCOUNTER — APPOINTMENT (OUTPATIENT)
Dept: HEMATOLOGY/ONCOLOGY | Facility: HOSPITAL | Age: 79
End: 2024-05-17
Attending: INTERNAL MEDICINE
Payer: MEDICARE

## 2024-05-24 ENCOUNTER — OFFICE VISIT (OUTPATIENT)
Dept: HEMATOLOGY/ONCOLOGY | Facility: HOSPITAL | Age: 79
End: 2024-05-24
Attending: INTERNAL MEDICINE
Payer: MEDICARE

## 2024-05-24 VITALS
WEIGHT: 102.38 LBS | OXYGEN SATURATION: 100 % | HEART RATE: 69 BPM | DIASTOLIC BLOOD PRESSURE: 64 MMHG | TEMPERATURE: 98 F | SYSTOLIC BLOOD PRESSURE: 154 MMHG | BODY MASS INDEX: 18.6 KG/M2 | HEIGHT: 62.01 IN

## 2024-05-24 DIAGNOSIS — K91.850 POUCHITIS (HCC): Primary | ICD-10-CM

## 2024-05-24 PROCEDURE — 96365 THER/PROPH/DIAG IV INF INIT: CPT

## 2024-05-24 NOTE — PROGRESS NOTES
Pt here for INFED . Pt denies any issues or concerns.      Ordering MD: Dr. Miranda  Order Exp: one-time dose     Pt tolerated infusion without difficulty or complaint. Reviewed next apt date/time: Patient aware that she needs labs in 3 months after her iron infusion. Orders have been entered in the system. Patient will go to outpatient lab to get these done.      Education Record  Learner:  Patient  Disease / Diagnosis: Iron Deficiency Anemia  Barriers / Limitations:  None  Method:  Brief focused and Reinforcement  General Topics:  Medication, Side effects and symptom management, Plan of care reviewed, and Fall risk and prevention  Outcome:  Shows understanding  Tolerated iron infusion without any issues. Vital signs taken and recorded after a 5-minute flush. Patient discharged in good condition.

## 2024-06-03 ENCOUNTER — TELEPHONE (OUTPATIENT)
Dept: HEMATOLOGY/ONCOLOGY | Facility: HOSPITAL | Age: 79
End: 2024-06-03

## 2024-06-03 NOTE — TELEPHONE ENCOUNTER
Kj calling she had iron infusion since that she said her face is really tanned. She said no itching or burning denies any other symptoms. jani

## 2024-06-03 NOTE — TELEPHONE ENCOUNTER
5/24/24 - Infed infusion    Patient called concerned that she is very tanned in the face, chin dark, eye lids.  Feels white of eyes more yellow than usual.    Denies fevers or chills, no sob or chest pain. Denies n/v/d.  Is eating and drinking well.  Denies lightheadedness or dizziness.  No urinary or bowel concerns.    She is requesting a call back to discuss she is concerned about this skin discoloration.

## 2024-06-05 ENCOUNTER — TELEPHONE (OUTPATIENT)
Dept: INTERNAL MEDICINE CLINIC | Facility: CLINIC | Age: 79
End: 2024-06-05

## 2024-06-05 NOTE — TELEPHONE ENCOUNTER
Patient came into office per billing department instructions to give RX insurance card to scan into chart.   On DOS 3/11/24 patient was seen by Dr. Chaparro. At this visit she was given Twinrix dose # 1. Per patient's insurance immunization are to be completed at pharmacy. Patient was not given ABN form to sign during visit.   Please rebill immunization for twinrix to   [facility]  [address] [place] [address]   ID: [id number]  Plan ([id number]): [id number]  Phone # [phone]  (Copy of insurance card is in patient documents)

## 2024-06-19 ENCOUNTER — HOSPITAL ENCOUNTER (OUTPATIENT)
Dept: LAB | Facility: HOSPITAL | Age: 79
Discharge: HOME | End: 2024-06-19
Attending: INTERNAL MEDICINE

## 2024-06-19 DIAGNOSIS — D50.0 IRON DEFICIENCY ANEMIA DUE TO CHRONIC BLOOD LOSS: ICD-10-CM

## 2024-06-19 LAB
BASOPHILS # BLD AUTO: 0.03 X10(3) UL (ref 0–0.2)
BASOPHILS NFR BLD AUTO: 0.6 %
EOSINOPHIL # BLD AUTO: 0.22 X10(3) UL (ref 0–0.7)
EOSINOPHIL NFR BLD AUTO: 4.6 %
ERYTHROCYTE [DISTWIDTH] IN BLOOD BY AUTOMATED COUNT: 21.3 %
HCT VFR BLD AUTO: 34.9 %
HGB BLD-MCNC: 10.6 G/DL
IMM GRANULOCYTES # BLD AUTO: 0.01 X10(3) UL (ref 0–1)
IMM GRANULOCYTES NFR BLD: 0.2 %
LYMPHOCYTES # BLD AUTO: 1.26 X10(3) UL (ref 1–4)
LYMPHOCYTES NFR BLD AUTO: 26.4 %
MCH RBC QN AUTO: 25.1 PG (ref 26–34)
MCHC RBC AUTO-ENTMCNC: 30.4 G/DL (ref 31–37)
MCV RBC AUTO: 82.7 FL
MONOCYTES # BLD AUTO: 0.3 X10(3) UL (ref 0.1–1)
MONOCYTES NFR BLD AUTO: 6.3 %
NEUTROPHILS # BLD AUTO: 2.95 X10 (3) UL (ref 1.5–7.7)
NEUTROPHILS # BLD AUTO: 2.95 X10(3) UL (ref 1.5–7.7)
NEUTROPHILS NFR BLD AUTO: 61.9 %
PLATELET # BLD AUTO: 297 10(3)UL (ref 150–450)
RBC # BLD AUTO: 4.22 X10(6)UL
WBC # BLD AUTO: 4.8 X10(3) UL (ref 4–11)

## 2024-06-19 PROCEDURE — 36415 COLL VENOUS BLD VENIPUNCTURE: CPT | Performed by: INTERNAL MEDICINE

## 2024-06-19 PROCEDURE — 85025 COMPLETE CBC W/AUTO DIFF WBC: CPT | Performed by: INTERNAL MEDICINE

## 2024-06-24 ENCOUNTER — APPOINTMENT (OUTPATIENT)
Dept: HEMATOLOGY/ONCOLOGY | Facility: HOSPITAL | Age: 79
End: 2024-06-24
Attending: INTERNAL MEDICINE
Payer: MEDICARE

## 2024-06-24 ENCOUNTER — HOSPITAL ENCOUNTER (OUTPATIENT)
Facility: HOSPITAL | Age: 79
Setting detail: HOSPITAL OUTPATIENT SURGERY
Discharge: HOME | End: 2024-06-24
Attending: INTERNAL MEDICINE | Admitting: INTERNAL MEDICINE

## 2024-06-24 ENCOUNTER — ANESTHESIA EVENT (OUTPATIENT)
Dept: ENDOSCOPY | Facility: HOSPITAL | Age: 79
End: 2024-06-24

## 2024-06-24 ENCOUNTER — ANESTHESIA (OUTPATIENT)
Dept: ENDOSCOPY | Facility: HOSPITAL | Age: 79
End: 2024-06-24

## 2024-06-24 VITALS
OXYGEN SATURATION: 100 % | DIASTOLIC BLOOD PRESSURE: 74 MMHG | RESPIRATION RATE: 16 BRPM | BODY MASS INDEX: 19.14 KG/M2 | HEART RATE: 66 BPM | SYSTOLIC BLOOD PRESSURE: 123 MMHG | WEIGHT: 104 LBS | HEIGHT: 62 IN | TEMPERATURE: 98 F

## 2024-06-24 DIAGNOSIS — K91.850 POUCHITIS (HCC): ICD-10-CM

## 2024-06-24 DIAGNOSIS — K50.011 CROHN'S DISEASE OF SMALL INTESTINE WITH RECTAL BLEEDING (HCC): ICD-10-CM

## 2024-06-24 LAB — GLUCOSE BLD-MCNC: 137 MG/DL (ref 70–99)

## 2024-06-24 PROCEDURE — 88305 TISSUE EXAM BY PATHOLOGIST: CPT | Performed by: INTERNAL MEDICINE

## 2024-06-24 PROCEDURE — 0DBN8ZX EXCISION OF SIGMOID COLON, VIA NATURAL OR ARTIFICIAL OPENING ENDOSCOPIC, DIAGNOSTIC: ICD-10-PCS | Performed by: INTERNAL MEDICINE

## 2024-06-24 PROCEDURE — 0DB78ZX EXCISION OF STOMACH, PYLORUS, VIA NATURAL OR ARTIFICIAL OPENING ENDOSCOPIC, DIAGNOSTIC: ICD-10-PCS | Performed by: INTERNAL MEDICINE

## 2024-06-24 PROCEDURE — 82962 GLUCOSE BLOOD TEST: CPT

## 2024-06-24 PROCEDURE — 0DB98ZX EXCISION OF DUODENUM, VIA NATURAL OR ARTIFICIAL OPENING ENDOSCOPIC, DIAGNOSTIC: ICD-10-PCS | Performed by: INTERNAL MEDICINE

## 2024-06-24 RX ORDER — SODIUM CHLORIDE, SODIUM LACTATE, POTASSIUM CHLORIDE, CALCIUM CHLORIDE 600; 310; 30; 20 MG/100ML; MG/100ML; MG/100ML; MG/100ML
INJECTION, SOLUTION INTRAVENOUS CONTINUOUS
Status: DISCONTINUED | OUTPATIENT
Start: 2024-06-24 | End: 2024-06-24

## 2024-06-24 RX ORDER — HYDROMORPHONE HYDROCHLORIDE 1 MG/ML
0.6 INJECTION, SOLUTION INTRAMUSCULAR; INTRAVENOUS; SUBCUTANEOUS EVERY 5 MIN PRN
Status: DISCONTINUED | OUTPATIENT
Start: 2024-06-24 | End: 2024-06-24

## 2024-06-24 RX ORDER — NALOXONE HYDROCHLORIDE 0.4 MG/ML
0.08 INJECTION, SOLUTION INTRAMUSCULAR; INTRAVENOUS; SUBCUTANEOUS AS NEEDED
Status: DISCONTINUED | OUTPATIENT
Start: 2024-06-24 | End: 2024-06-24

## 2024-06-24 RX ORDER — NICOTINE POLACRILEX 4 MG
30 LOZENGE BUCCAL
Status: DISCONTINUED | OUTPATIENT
Start: 2024-06-24 | End: 2024-06-24

## 2024-06-24 RX ORDER — LIDOCAINE HYDROCHLORIDE 10 MG/ML
INJECTION, SOLUTION EPIDURAL; INFILTRATION; INTRACAUDAL; PERINEURAL AS NEEDED
Status: DISCONTINUED | OUTPATIENT
Start: 2024-06-24 | End: 2024-06-24 | Stop reason: SURG

## 2024-06-24 RX ORDER — NICOTINE POLACRILEX 4 MG
15 LOZENGE BUCCAL
Status: DISCONTINUED | OUTPATIENT
Start: 2024-06-24 | End: 2024-06-24

## 2024-06-24 RX ORDER — ONDANSETRON 2 MG/ML
4 INJECTION INTRAMUSCULAR; INTRAVENOUS EVERY 6 HOURS PRN
Status: DISCONTINUED | OUTPATIENT
Start: 2024-06-24 | End: 2024-06-24

## 2024-06-24 RX ORDER — METOCLOPRAMIDE HYDROCHLORIDE 5 MG/ML
10 INJECTION INTRAMUSCULAR; INTRAVENOUS EVERY 8 HOURS PRN
Status: DISCONTINUED | OUTPATIENT
Start: 2024-06-24 | End: 2024-06-24

## 2024-06-24 RX ORDER — HYDROMORPHONE HYDROCHLORIDE 1 MG/ML
0.4 INJECTION, SOLUTION INTRAMUSCULAR; INTRAVENOUS; SUBCUTANEOUS EVERY 5 MIN PRN
Status: DISCONTINUED | OUTPATIENT
Start: 2024-06-24 | End: 2024-06-24

## 2024-06-24 RX ORDER — DEXTROSE MONOHYDRATE 25 G/50ML
50 INJECTION, SOLUTION INTRAVENOUS
Status: DISCONTINUED | OUTPATIENT
Start: 2024-06-24 | End: 2024-06-24

## 2024-06-24 RX ORDER — HYDROMORPHONE HYDROCHLORIDE 1 MG/ML
0.2 INJECTION, SOLUTION INTRAMUSCULAR; INTRAVENOUS; SUBCUTANEOUS EVERY 5 MIN PRN
Status: DISCONTINUED | OUTPATIENT
Start: 2024-06-24 | End: 2024-06-24

## 2024-06-24 RX ADMIN — SODIUM CHLORIDE, SODIUM LACTATE, POTASSIUM CHLORIDE, CALCIUM CHLORIDE: 600; 310; 30; 20 INJECTION, SOLUTION INTRAVENOUS at 07:32:00

## 2024-06-24 RX ADMIN — SODIUM CHLORIDE, SODIUM LACTATE, POTASSIUM CHLORIDE, CALCIUM CHLORIDE: 600; 310; 30; 20 INJECTION, SOLUTION INTRAVENOUS at 07:05:00

## 2024-06-24 RX ADMIN — LIDOCAINE HYDROCHLORIDE 25 MG: 10 INJECTION, SOLUTION EPIDURAL; INFILTRATION; INTRACAUDAL; PERINEURAL at 07:09:00

## 2024-06-24 NOTE — DISCHARGE INSTRUCTIONS
Home Care Instructions for Colonoscopy and/or Gastroscopy with Sedation    Diet:  - Resume your regular diet as tolerated unless otherwise instructed.  - Start with light meals to minimize bloating.  - Do not drink alcohol today.    Medication:  - If you have questions about resuming your normal medications, please contact your Primary Care Physician.    Activities:  - Take it easy today. Do not return to work today.  - Do not drive today.  - Do not operate any machinery today (including kitchen equipment).    Colonoscopy:  - You may notice some rectal \"spotting\" (a little blood on the toilet tissue) for a day or two after the exam. This is normal.  - If you experience any rectal bleeding (not spotting), persistent tenderness or sharp severe abdominal pains, oral temperature over 100 degrees Fahrenheit, light-headedness or dizziness, or any other problems, contact your doctor.    Gastroscopy:  - You may have a sore throat for 2-3 days following the exam. This is normal. Gargling with warm salt water (1/2 tsp salt to 1 glass warm water) or using throat lozenges will help.  - If you experience any sharp pain in your neck, abdomen or chest, vomiting of blood, oral temperature over 100 degrees Fahrenheit, light-headedness or dizziness, or any other problems, contact your doctor.    **If unable to reach your doctor, please go to the [facility] Emergency Room**    - Your referring physician will receive a full report of your examination.  - If you do not hear from your doctor's office within two weeks of your biopsy, please call them for your results.    You may be able to see your laboratory results in [employer] between 4 and 7 business days.  In some cases, your physician may not have viewed the results before they are released to [employer].  If you have questions regarding your results contact the physician who ordered the test/exam by phone or via [employer] by choosing \"Ask a Medical Question.\"

## 2024-06-24 NOTE — ANESTHESIA POSTPROCEDURE EVALUATION
[facility] Patient Status:  Hospital Outpatient Surgery   Age/Gender 78 year old female MRN [id number]   Location [facility] ENDOSCOPY PAIN CENTER Attending Reese Sanchez MD   Hosp Day # 0 PCP Ethan Chaparro MD       Anesthesia Post-op Note    ESOPHAGOGASTRODUODENOSCOPY (EGD) WITH BIOPSIES, FLEXIBLE SIGMOIDOSCOPY WITH BIOPSIES    Procedure Summary       Date: 06/24/24 Room / Location:  ENDOSCOPY 04 /  ENDOSCOPY    Anesthesia Start: 0709 Anesthesia Stop: 0732    Procedures:       ESOPHAGOGASTRODUODENOSCOPY (EGD) WITH BIOPSIES, FLEXIBLE SIGMOIDOSCOPY WITH BIOPSIES      FLEXIBLE SIGMOIDOSCOPY Diagnosis:       Pouchitis (HCC)      Crohn's disease of small intestine with rectal bleeding (HCC)      (EGD: GASTRITIS ERYTHEMA, POUCHITIS)    Surgeons: Reese Sanchez MD Anesthesiologist: Mateus Brewer MD    Anesthesia Type: MAC ASA Status: 2            Anesthesia Type: MAC    Vitals Value Taken Time   BP 81/35 06/24/24 0732   Temp 98.3 °F (36.8 °C) 06/24/24 0732   Pulse 60 06/24/24 0732   Resp 16 06/24/24 0732   SpO2 100 % 06/24/24 0732       Patient Location: Endoscopy    Anesthesia Type: MAC    Airway Patency: patent    Postop Pain Control: adequate    Mental Status: mildly sedated but able to meaningfully participate in the post-anesthesia evaluation    Nausea/Vomiting: none    Cardiopulmonary/Hydration status: stable euvolemic    Complications: no apparent anesthesia related complications    Postop vital signs: stable    Dental Exam: Unchanged from Preop

## 2024-06-24 NOTE — OPERATIVE REPORT
EGD Operative Report  Patient Name: Kj Condon  YOB: 1945  MRN: [id number]  Procedure: Esophagogastroduodenoscopy (EGD)  with cold forceps biopsies  Pre-operative Diagnosis & Indication: MARIANA, gastric intestinal metaplasia  Post-operative Diagnosis: mild antral erythema  Attending Endoscopist: Reese Sanchez MD  Informed Consent: The planned procedure(s), the explanation of the procedure, its expected benefits, the potential complications and risks and possible alternatives and their benefits and risks were discussed with the patient or the patient's surrogate. The discussion of risks, not limited to but including bleeding, infection, perforation, adverse effects from anesthesia, need for emergency surgery/prolonged hospitalization,  cardiac arrhythmias,  and aspiration were discussed with patient.  Pt and/or surrogate understood the proposed procedure(s), its risks, benefits and alternatives and wish to proceed with procedure(s). All questions answered in full.  After all questions were answered to their satisfaction, a signed, informed, and witnessed consent was obtained.  Physical Exam: Heart: regular rate and rhythm. No rubs, murmurs, or gallops. Lungs: Clear to auscultation bilaterally. Abdomen: Soft, non-tender, non distended. No rebound tenderness, no guarding.   A TIME OUT WAS COMPLETED prior to the procedure to confirm the patient, procedure(s) and complete endoscopy safety procedure.  Sedation: Monitored Anesthesia Care; ASA class per anesthesiology team   Monitoring: Pulsoximetry, pulse, respirations, and blood pressure , vitals were monitored throughout the entire procedure under monitored anesthesia care.   Procedure: The patient was then brought to the endoscopy suite where his/her pulse, pulse oximetry and blood pressure were monitored. The patient was placed in the left lateral decubitus position and deep sedation was administered. Once adequate sedation was achieved, a bite block was  placed and a lubricated tip of an Olympus video upper endoscope was inserted through the oropharynx and gently manipulated through the esophagus into the stomach and the second portion of the duodenum. Upon withdrawal of the endoscope, careful visualization of the mucosa was performed. The endoscope was then withdrawn into the gastric antrum and placed in a retroflexed position.  The endoscope was then righted, and air was suctioned from the stomach.  The endoscope was then withdrawn from the patient, with careful visual inspection of the mucosa. The patient left the procedure room in stable condition for recovery. Findings and endotherapy as listed below  Toleration: Patient tolerated procedure well   Complications: No immediate complications   Technical Difficulty:  The procedure was not technically difficult   Estimated Blood Loss: Minimal, less than 5mL of estimated blood loss.   Findings and Therapeutics:  Esophagus:   The mucosa was normal.   There were no strictures or stenosis. GEJ junction traversed with endoscope without resistance.  The Z-line was irregular, appreciated at 36 cm from the incisors.    Stomach:    There was mild antral erythema, but otherwise the gastric body, antrum, fundus, cardia, and angularis were normal. No ulcers, erosions or masses visualized. Endoscope was placed in a retroflexion view in the stomach. There was  no evidence of a hiatal hernia. Biopsies with cold forceps were obtained in the antrum along the lesser and greater curvatures, the incisura, and in the body along the lesser and greater curvatures.  Duodenum:   The entire examined duodenum was normal.  Biopsies with cold forceps were obtained in the bulb and 2nd portion.   Recommendations:  Post EGD precautions, watch for bleeding, infection, perforation, adverse drug reactions   Follow-up biopsies  Proceed with flex sig    Reese Sanchez MD  6/24/2024  7:31 AM

## 2024-06-24 NOTE — H&P
History & Physical Examination    Patient Name: Kj Condon  MRN: [id number]  CSN: [id number]  YOB: 1945    Diagnosis: MARIANA, Crohn's (colectomy, IPAA)    Present Illness: 79 y/o F history of MARIANA, Crohn's disease presents for EGD/flex sig.    Facility-Administered Medications Prior to Admission   Medication Dose Route Frequency Provider Last Rate Last Admin    vedolizumab (Entyvio) 300 mg in sodium chloride 0.9% 254.8 mL IVPB  300 mg Intravenous Once Reese Sanchez MD         Medications Prior to Admission   Medication Sig Dispense Refill Last Dose    glipiZIDE ER 5 MG Oral Tablet 24 Hr    6/24/2024 at 0515    pioglitazone 15 MG Oral Tab 1 tablet (15 mg total).   6/23/2024    mesalamine 1.2 g Oral Tab EC Take 1 tablet (1.2 g total) by mouth daily.   Past Month    Ferrous Sulfate 325 (65 Fe) MG Oral Tab Take 0.2 tablets (65 mg total) by mouth As Directed.   Past Month    Pantoprazole Sodium 40 MG Oral Tab EC Take 1 tablet (40 mg total) by mouth daily. Before meal (Patient taking differently: Take 1 tablet (40 mg total) by mouth every morning before breakfast. Before meal) 60 tablet 0 6/23/2024    atorvastatin 40 MG Oral Tab Take 0.5 tablets (20 mg total) by mouth nightly.  6 6/23/2024    atenolol 50 MG Oral Tab Take 0.5 tablets (25 mg total) by mouth daily.  1 6/24/2024 at 0515    Calcium Carb-Cholecalciferol (CALCIUM + D3) 600-200 MG-UNIT Oral Tab Take 1 tablet by mouth daily.   6/23/2024    Glucose Blood (FREESTYLE LITE TEST) In Vitro Strip Use as directed. 100 strip 0     Blood Glucose Monitoring Suppl (ONETOUCH VERIO REFLECT) w/Device Does not apply Kit Take 1 Bottle by mouth As Directed.       Lancets (ONETOUCH DELICA PLUS [id number]) Does not apply Misc 1 strip by In Vitro route daily.       zolpidem 10 MG Oral Tab Take 1 tablet (10 mg total) by mouth nightly as needed.   Unknown    ketoconazole 2 % External Cream Apply 1 Application topically 2 (two) times daily.   Unknown    triamcinolone 0.1 %  External Cream Apply topically 2 (two) times daily.   Unknown    nitroglycerin (RECTIV) 0.4 % Rectal Ointment 1.5 g as needed.   Unknown    Glucose Blood (FREESTYLE LITE TEST) In Vitro Strip Tests 1 x daily 100 strip 2     Multiple Vitamins-Minerals (TAB-A-SHLOMO) Oral Tab Take 1 tablet by mouth daily.   Unknown     Current Facility-Administered Medications   Medication Dose Route Frequency    glucose (Dex4) 15 GM/59ML oral liquid 15 g  15 g Oral Q15 Min PRN    Or    glucose (Glutose) 40% oral gel 15 g  15 g Oral Q15 Min PRN    Or    glucose-vitamin C (Dex-4) chewable tab 4 tablet  4 tablet Oral Q15 Min PRN    Or    dextrose 50% injection 50 mL  50 mL Intravenous Q15 Min PRN    Or    glucose (Dex4) 15 GM/59ML oral liquid 30 g  30 g Oral Q15 Min PRN    Or    glucose (Glutose) 40% oral gel 30 g  30 g Oral Q15 Min PRN    Or    glucose-vitamin C (Dex-4) chewable tab 8 tablet  8 tablet Oral Q15 Min PRN    lactated ringers infusion   Intravenous Continuous       Allergies: No Known Allergies    Past Medical History:    Blood in the stool    CAD (coronary artery disease)    Dr. Montero     Calculus of kidney    Colitis    Constipation    Coronary atherosclerosis of native coronary artery    Diabetes (HCC)    Displacement of lumbar intervertebral disc without myelopathy    Dizziness    DVT (deep venous thrombosis) ([facility])    Frequent use of laxatives    Gall stones    Hemorrhoids    High cholesterol    Hip fracture (HCC)    History of blood transfusion    Irregular bowel habits    Kidney stones    Lumbar disc disease    Osteoporosis    Senile osteoporosis    Shortness of breath    Stented coronary artery    Stool incontinence    Stress fracture, left fibula, initial encounter for fracture    Weight loss     Past Surgical History:   Procedure Laterality Date    Angioplasty (coronary)      Colectomy      Fracture surgery Right     ankle    Lithotripsy      Lmbr epidural steroid inj, physiatry (internal)       Sigmoidoscopy,diagnostic       Family History   Problem Relation Age of Onset    Heart Surgery Father         CABG    Diabetes Mother         Mellitus      Social History     Tobacco Use    Smoking status: Never    Smokeless tobacco: Never   Substance Use Topics    Alcohol use: No     Alcohol/week: 0.0 standard drinks of alcohol       SYSTEM Check if Review is Normal Check if Physical Exam is Normal If not normal, please explain:   HEENT [ x] [x ]    NECK & BACK [ x] [ x]    HEART [ x] [x ]    LUNGS [ x] [ x]    ABDOMEN [ x] [x ]    UROGENITAL [ n/a] [ n/a]    EXTREMITIES [ x] [x ]    OTHER        [ x ] I have discussed the risks and benefits and alternatives with the patient/family.  They understand and agree to proceed with plan of care.  [ x ] I have reviewed the History and Physical done within the last 30 days.  Any changes noted above.    Reese Sanchez MD  6/24/2024  6:56 AM

## 2024-06-24 NOTE — OPERATIVE REPORT
Flexible Sigmoidoscopy Operative Report  Patient Name: Kj Condon  YOB: 1945  MRN: [id number]  Procedure: flexible sigmoidoscopy with cold forceps biopsies  Pre-operative Diagnosis & Indication: rectal bleeding, Crohn's with history of colectomy s/p IPAA  Post-operative Diagnosis: pouchitis  Attending Endoscopist: Reese Sanchez MD  Informed Consent: The planned procedure(s), the explanation of the procedure, its expected benefits, the potential complications and risks and possible alternatives and their benefits and risks were discussed with the patient or the patient's surrogate. The discussion of risks, not limited to but including bleeding, infection, perforation, adverse effects from anesthesia, need for emergency surgery, medication effects, cardiac arrhythmias, missed polyps, and aspiration and death, were discussed with patient.  Pt and/or surrogate understood the proposed procedure(s), its risks, benefits and alternatives and wish to proceed with procedure(s). All questions answered in full.  After all questions were answered to their satisfaction, a signed, informed, and witnessed consent was obtained.  Physical Exam: Heart: regular rate and rhythm. No rubs, murmurs, or gallops. Lungs: Clear to auscultation bilaterally. Abdomen: Soft, non-tender, non distended. Positive bowel sounds. No rebound tenderness, no guarding.   A TIME OUT WAS COMPLETED prior to the procedure to confirm the patient, procedure and complete endoscopy safety procedure.   Sedation: Monitored Anesthesia Care; ASA class per anesthesiology team   Monitoring: Pulsoximetry, pulse, respirations, and blood pressure, vitals were monitored throughout the entire procedure under monitored anesthesia care.   Preparation Quality:  inadequate  Procedure: After achieving adequate sedation, and placing the patient in the left lateral decubitus position, a digital rectal examination was performed.  The lubricated tip of the gastroscope  was then introduced into the rectum and advanced to the jose terminal ileum.  The jose terminal ileum was intubated.  The endoscope was then carefully withdrawn from the patient with careful visualization of the colonic mucosa to the best of my ability, with bowel preparation quality as noted above.  Air was suctioned to the best of my ability, during withdrawal of the endoscope. The endoscope was then removed from the patient.  The patient tolerated the procedure without apparent procedural complications.  The patient left the procedure room in stable condition for recovery.  Toleration: Patient tolerated procedure well   Complications: No immediate complications   Technical Difficulty:  The procedure was not technically difficult   Estimated Blood Loss: Minimal, less than 5mL of estimated blood loss.   Findings and Therapeutics:  There was solid stool obscuring visualization of much of the pouch and preventing visualization of the efferent limb and the pouch inlet. The afferent limb was able to be intubated, and the mucosa of this limb appeared normal. There were several discrete shallow, clean based ulcerations, with surrounding erythema, in the J pouch body. Biopsies of these ulcerations and of the J pouch body mucosa were obtained with a cold forceps for histology.  Recommendations:    Post flex sig precautions, watch for bleeding, infection, perforation, adverse drug reactions.   Follow up pathology.  Continue Entyvio.  Will attempt to obtain images from prior flex sig to compare to this exam.  If symptoms continue to be ongoing, will need consultation with [facility] to determine if another biologic or diverting ileostomy would be possible options for her.    Reese Sanchez MD  6/24/2024  7:32 AM

## 2024-06-24 NOTE — ANESTHESIA PREPROCEDURE EVALUATION
PRE-OP EVALUATION    Patient Name: Kj Condon    Admit Diagnosis: Pouchitis (HCC) [K91.850]  Crohn's disease of small intestine with rectal bleeding (HCC) [K50.011]    Pre-op Diagnosis: Pouchitis (HCC) [K91.850]  Crohn's disease of small intestine with rectal bleeding (HCC) [K50.011]    ESOPHAGOGASTRODUODENOSCOPY (EGD), FLEXIBLE SIGMOIDOSCOPY    Anesthesia Procedure: ESOPHAGOGASTRODUODENOSCOPY (EGD), FLEXIBLE SIGMOIDOSCOPY  FLEXIBLE SIGMOIDOSCOPY    Surgeons and Role:     * Reese Sanchez MD - Primary    Pre-op vitals reviewed.  Temp: 98.1 °F (36.7 °C)  Pulse: 62  Resp: 18  BP: 171/65  SpO2: 100 %  Body mass index is 19.02 kg/m².    Current medications reviewed.  Hospital Medications:  • glucose (Dex4) 15 GM/59ML oral liquid 15 g  15 g Oral Q15 Min PRN    Or   • glucose (Glutose) 40% oral gel 15 g  15 g Oral Q15 Min PRN    Or   • glucose-vitamin C (Dex-4) chewable tab 4 tablet  4 tablet Oral Q15 Min PRN    Or   • dextrose 50% injection 50 mL  50 mL Intravenous Q15 Min PRN    Or   • glucose (Dex4) 15 GM/59ML oral liquid 30 g  30 g Oral Q15 Min PRN    Or   • glucose (Glutose) 40% oral gel 30 g  30 g Oral Q15 Min PRN    Or   • glucose-vitamin C (Dex-4) chewable tab 8 tablet  8 tablet Oral Q15 Min PRN   • lactated ringers infusion   Intravenous Continuous       Outpatient Medications:     Facility-Administered Medications Prior to Admission   Medication Dose Route Frequency Provider Last Rate Last Admin   • vedolizumab (Entyvio) 300 mg in sodium chloride 0.9% 254.8 mL IVPB  300 mg Intravenous Once Reese Sanchez MD         Medications Prior to Admission   Medication Sig Dispense Refill Last Dose   • glipiZIDE ER 5 MG Oral Tablet 24 Hr       • pioglitazone 15 MG Oral Tab 1 tablet (15 mg total).      • mesalamine 1.2 g Oral Tab EC Take 1 tablet (1.2 g total) by mouth daily.      • Ferrous Sulfate 325 (65 Fe) MG Oral Tab Take 0.2 tablets (65 mg total) by mouth As Directed.      • Pantoprazole Sodium 40 MG Oral Tab EC  Take 1 tablet (40 mg total) by mouth daily. Before meal (Patient taking differently: Take 1 tablet (40 mg total) by mouth every morning before breakfast. Before meal) 60 tablet 0    • Multiple Vitamins-Minerals (TAB-A-SHLOMO) Oral Tab Take 1 tablet by mouth daily.      • atorvastatin 40 MG Oral Tab Take 0.5 tablets (20 mg total) by mouth nightly.  6    • atenolol 50 MG Oral Tab Take 0.5 tablets (25 mg total) by mouth daily.  1    • Calcium Carb-Cholecalciferol (CALCIUM + D3) 600-200 MG-UNIT Oral Tab Take 1 tablet by mouth daily.      • Glucose Blood (FREESTYLE LITE TEST) In Vitro Strip Use as directed. 100 strip 0    • Blood Glucose Monitoring Suppl (ONETOUCH VERIO REFLECT) w/Device Does not apply Kit Take 1 Bottle by mouth As Directed.      • Lancets (ONETOUCH DELICA PLUS [id number]) Does not apply Misc 1 strip by In Vitro route daily.      • zolpidem 10 MG Oral Tab Take 1 tablet (10 mg total) by mouth nightly as needed.      • ketoconazole 2 % External Cream Apply 1 Application topically 2 (two) times daily.      • triamcinolone 0.1 % External Cream Apply topically 2 (two) times daily.      • nitroglycerin (RECTIV) 0.4 % Rectal Ointment 1.5 g as needed.      • Glucose Blood (FREESTYLE LITE TEST) In Vitro Strip Tests 1 x daily 100 strip 2        Allergies: Patient has no known allergies.      Anesthesia Evaluation    Patient summary reviewed.    Anesthetic Complications           GI/Hepatic/Renal                                 Cardiovascular                  (+) hypertension     (+) CAD                                Endo/Other      (+) diabetes                            Pulmonary               (+) shortness of breath            Neuro/Psych                                  Past Surgical History:   Procedure Laterality Date   • Angioplasty (coronary)     • Colectomy     • Fracture surgery Right     ankle   • Lithotripsy     • Lmbr epidural steroid inj, physiatry (internal)     • Sigmoidoscopy,diagnostic       Social  History     Socioeconomic History   • Marital status:    Tobacco Use   • Smoking status: Never   • Smokeless tobacco: Never   Vaping Use   • Vaping status: Never Used   Substance and Sexual Activity   • Alcohol use: No     Alcohol/week: 0.0 standard drinks of alcohol   • Drug use: No   • Sexual activity: Never   Other Topics Concern   • Caffeine Concern Yes     Comment: 1 cup tea once per day    • Exercise Yes     Comment: x7 days per week      History   Drug Use No     Available pre-op labs reviewed.  Lab Results   Component Value Date    WBC 4.8 06/19/2024    RBC 4.22 06/19/2024    HGB 10.6 (L) 06/19/2024    HCT 34.9 (L) 06/19/2024    MCV 82.7 06/19/2024    MCH 25.1 (L) 06/19/2024    MCHC 30.4 (L) 06/19/2024    RDW 21.3 06/19/2024    .0 06/19/2024     Lab Results   Component Value Date     (L) 04/29/2024    K 4.4 04/29/2024     04/29/2024    CO2 24.0 04/29/2024    BUN 21 04/29/2024    CREATSERUM 1.13 (H) 04/29/2024     (H) 04/29/2024    CA 8.8 04/29/2024            Airway      Mallampati: I  Mouth opening: >3 FB  TM distance: > 6 cm  Neck ROM: full Cardiovascular    Cardiovascular exam normal.  Rhythm: regular  Rate: normal     Dental             Pulmonary    Pulmonary exam normal.                 Other findings        ASA: 2   Plan: MAC  NPO status verified and patient meets guidelines.          Plan/risks discussed with: patient and significant other            Present on Admission:  **None**

## 2024-06-25 ENCOUNTER — APPOINTMENT (OUTPATIENT)
Dept: URBAN - METROPOLITAN AREA CLINIC 242 | Age: 79
Setting detail: DERMATOLOGY
End: 2024-06-25

## 2024-06-25 ENCOUNTER — TELEPHONE (OUTPATIENT)
Dept: HEMATOLOGY/ONCOLOGY | Facility: HOSPITAL | Age: 79
End: 2024-06-25

## 2024-06-25 DIAGNOSIS — L20.89 OTHER ATOPIC DERMATITIS: ICD-10-CM

## 2024-06-25 PROCEDURE — OTHER PRESCRIPTION: OTHER

## 2024-06-25 PROCEDURE — OTHER PRESCRIPTION MEDICATION MANAGEMENT: OTHER

## 2024-06-25 PROCEDURE — 99213 OFFICE O/P EST LOW 20 MIN: CPT

## 2024-06-25 PROCEDURE — OTHER COUNSELING: OTHER

## 2024-06-25 RX ORDER — TRIAMCINOLONE ACETONIDE 1 MG/G
CREAM TOPICAL
Qty: 80 | Refills: 0 | Status: ERX

## 2024-06-25 NOTE — TELEPHONE ENCOUNTER
Spoke with patient. All questions and concerns addressed. She will repeat her labs end of July/ early August.

## 2024-06-25 NOTE — TELEPHONE ENCOUNTER
Kj  [phone] I would like to know if I Would be taking the same dose of Iron when I get my infusion. I see my dermatologist this after noon about the dark spot on my arm and face. Thanks Lovely

## 2024-06-25 NOTE — PROCEDURE: PRESCRIPTION MEDICATION MANAGEMENT
Initiate Treatment: Triamcinolone 0.1% cream BID x 2 weeks.
Detail Level: Zone
Render In Strict Bullet Format?: No

## 2024-07-05 ENCOUNTER — TELEPHONE (OUTPATIENT)
Dept: INTERNAL MEDICINE CLINIC | Facility: CLINIC | Age: 79
End: 2024-07-05

## 2024-07-05 NOTE — TELEPHONE ENCOUNTER
Pt requesting a Xray, due to her side pain that started Tuesday night. Pain is on the right side and arm with bruising.     Cb# [phone]

## 2024-07-05 NOTE — TELEPHONE ENCOUNTER
Called patient back to let her know Dr. Chaparro's recommendations. Patient verbalized understanding and will go to [facility] to be evaluated.

## 2024-07-05 NOTE — TELEPHONE ENCOUNTER
Spoke with patient to discuss pain on right side of body. Patient clarified that she fell off her bed on Tuesday night as she thought she was on her usual side of the bed. She turned over twice, fell off the bed, hit a table that hit her right rib. She states there are two bruises next to each other that is purple and red. One bruise is 2-3 inches long and the other is one and a half inches long. With movement, she has pain 7/10 and sharp, and at rest it's 2/10. She does have a history of anemia. She isn't taking any blood thinners. She isn't experiencing any other symptoms. She is requesting to get an Xray. This RN told patient that Dr. Chaparro is booked for today and will ask for recommendations. Patient was told to go to [facility] to be evaluated as they can do Xrays there and if pain gets worse and if bruise becomes larger, to be seen in [facility]. Patient verbalized understanding, but will await recommendations per Dr. Chaparro.    : This RN recommended pt to go to [facility] to get xray completed, but pt is awaiting further recs from . Any further recs? Would you like to see pt in [facility]? TY!    LOV 12/19/23 w/ Dr. Chaparro:  Senile osteoporosis  -     Vitamin D [E]; Future

## 2024-07-11 NOTE — TELEPHONE ENCOUNTER
Pt informed again to be seen at [facility] or for ofv in order to receive an x-ray, she will go to [facility]

## 2024-07-12 ENCOUNTER — OFFICE VISIT (OUTPATIENT)
Dept: HEMATOLOGY/ONCOLOGY | Facility: HOSPITAL | Age: 79
End: 2024-07-12
Attending: INTERNAL MEDICINE
Payer: MEDICARE

## 2024-07-12 VITALS
RESPIRATION RATE: 18 BRPM | DIASTOLIC BLOOD PRESSURE: 68 MMHG | OXYGEN SATURATION: 100 % | HEART RATE: 70 BPM | HEIGHT: 62.01 IN | TEMPERATURE: 97 F | SYSTOLIC BLOOD PRESSURE: 149 MMHG | WEIGHT: 106 LBS | BODY MASS INDEX: 19.26 KG/M2

## 2024-07-12 DIAGNOSIS — K91.850 POUCHITIS (HCC): Primary | ICD-10-CM

## 2024-07-12 PROCEDURE — 96365 THER/PROPH/DIAG IV INF INIT: CPT

## 2024-07-12 NOTE — PROGRESS NOTES
Education Record    Learner:  Patient    Disease / Diagnosis: UC. Here for Entyvio,    Barriers / Limitations:  None   Comments:    Method:  Discussion   Comments:    General Topics:  Medication and Plan of care reviewed   Comments:    Outcome:  Shows understanding   Comments:    Entyvio infused without incident. Patient discharged in stable condition.

## 2024-07-31 ENCOUNTER — HOSPITAL ENCOUNTER (OUTPATIENT)
Dept: LAB | Facility: HOSPITAL | Age: 79
Discharge: HOME | End: 2024-07-31
Attending: INTERNAL MEDICINE
Payer: MEDICARE

## 2024-07-31 DIAGNOSIS — K50.819 CROHN'S DISEASE OF SMALL AND LARGE INTESTINES WITH COMPLICATION (HCC): ICD-10-CM

## 2024-07-31 DIAGNOSIS — K51.019 CHRONIC ULCERATIVE ENTEROCOLITIS WITH COMPLICATION (HCC): ICD-10-CM

## 2024-07-31 DIAGNOSIS — D50.0 IRON DEFICIENCY ANEMIA DUE TO CHRONIC BLOOD LOSS: ICD-10-CM

## 2024-07-31 LAB
BASOPHILS # BLD AUTO: 0.02 X10(3) UL (ref 0–0.2)
BASOPHILS NFR BLD AUTO: 0.3 %
DEPRECATED HBV CORE AB SER IA-ACNC: 63.3 NG/ML
EOSINOPHIL # BLD AUTO: 0.3 X10(3) UL (ref 0–0.7)
EOSINOPHIL NFR BLD AUTO: 4.8 %
ERYTHROCYTE [DISTWIDTH] IN BLOOD BY AUTOMATED COUNT: 17.4 %
HCT VFR BLD AUTO: 35.6 %
HGB BLD-MCNC: 11.1 G/DL
IMM GRANULOCYTES # BLD AUTO: 0 X10(3) UL (ref 0–1)
IMM GRANULOCYTES NFR BLD: 0 %
IRON SATN MFR SERPL: 16 %
IRON SERPL-MCNC: 60 UG/DL
LYMPHOCYTES # BLD AUTO: 1.04 X10(3) UL (ref 1–4)
LYMPHOCYTES NFR BLD AUTO: 16.7 %
MCH RBC QN AUTO: 25.6 PG (ref 26–34)
MCHC RBC AUTO-ENTMCNC: 31.2 G/DL (ref 31–37)
MCV RBC AUTO: 82.2 FL
MONOCYTES # BLD AUTO: 0.38 X10(3) UL (ref 0.1–1)
MONOCYTES NFR BLD AUTO: 6.1 %
NEUTROPHILS # BLD AUTO: 4.48 X10 (3) UL (ref 1.5–7.7)
NEUTROPHILS # BLD AUTO: 4.48 X10(3) UL (ref 1.5–7.7)
NEUTROPHILS NFR BLD AUTO: 72.1 %
PLATELET # BLD AUTO: 284 10(3)UL (ref 150–450)
RBC # BLD AUTO: 4.33 X10(6)UL
TOTAL IRON BINDING CAPACITY: 370 UG/DL (ref 250–425)
TRANSFERRIN SERPL-MCNC: 281 MG/DL (ref 250–380)
WBC # BLD AUTO: 6.2 X10(3) UL (ref 4–11)

## 2024-07-31 PROCEDURE — 85025 COMPLETE CBC W/AUTO DIFF WBC: CPT | Performed by: INTERNAL MEDICINE

## 2024-07-31 PROCEDURE — 83550 IRON BINDING TEST: CPT | Performed by: INTERNAL MEDICINE

## 2024-07-31 PROCEDURE — 82728 ASSAY OF FERRITIN: CPT | Performed by: INTERNAL MEDICINE

## 2024-07-31 PROCEDURE — 83540 ASSAY OF IRON: CPT | Performed by: INTERNAL MEDICINE

## 2024-07-31 PROCEDURE — 36415 COLL VENOUS BLD VENIPUNCTURE: CPT | Performed by: INTERNAL MEDICINE

## 2024-09-05 ENCOUNTER — APPOINTMENT (OUTPATIENT)
Dept: HEMATOLOGY/ONCOLOGY | Facility: HOSPITAL | Age: 79
End: 2024-09-05
Attending: INTERNAL MEDICINE
Payer: MEDICARE

## 2024-09-06 ENCOUNTER — APPOINTMENT (OUTPATIENT)
Dept: HEMATOLOGY/ONCOLOGY | Facility: HOSPITAL | Age: 79
End: 2024-09-06
Attending: INTERNAL MEDICINE
Payer: MEDICARE

## 2024-09-09 ENCOUNTER — APPOINTMENT (OUTPATIENT)
Dept: HEMATOLOGY/ONCOLOGY | Facility: HOSPITAL | Age: 79
End: 2024-09-09
Attending: INTERNAL MEDICINE
Payer: MEDICARE

## 2024-09-11 ENCOUNTER — OFFICE VISIT (OUTPATIENT)
Dept: HEMATOLOGY/ONCOLOGY | Facility: HOSPITAL | Age: 79
End: 2024-09-11
Attending: INTERNAL MEDICINE
Payer: MEDICARE

## 2024-09-11 VITALS
SYSTOLIC BLOOD PRESSURE: 137 MMHG | HEART RATE: 55 BPM | TEMPERATURE: 97 F | RESPIRATION RATE: 16 BRPM | DIASTOLIC BLOOD PRESSURE: 60 MMHG | WEIGHT: 106 LBS | HEIGHT: 62.01 IN | OXYGEN SATURATION: 100 % | BODY MASS INDEX: 19.26 KG/M2

## 2024-09-11 DIAGNOSIS — K91.850 POUCHITIS (HCC): Primary | ICD-10-CM

## 2024-09-11 PROCEDURE — 96365 THER/PROPH/DIAG IV INF INIT: CPT

## 2024-09-11 NOTE — PROGRESS NOTES
Pt here for Entyvio . Pt denies any issues or concerns.      Ordering Provider: Dr. Sanchez  Order Exp: 3/21/25     Pt tolerated infusion without difficulty or complaint. Reviewed next apt date/time: Yes      Education Record  Learner:  Patient  Disease / Diagnosis: Crohn's  Barriers / Limitations:  None  Method:  Brief focused and Reinforcement  General Topics:  Medication, Side effects and symptom management, and Plan of care reviewed  Outcome:  Shows understanding

## 2024-10-15 ENCOUNTER — TELEPHONE (OUTPATIENT)
Dept: INTERNAL MEDICINE CLINIC | Facility: CLINIC | Age: 79
End: 2024-10-15

## 2024-10-15 DIAGNOSIS — E11.9 TYPE 2 DIABETES MELLITUS WITHOUT COMPLICATION, WITHOUT LONG-TERM CURRENT USE OF INSULIN (HCC): Primary | ICD-10-CM

## 2024-10-15 NOTE — TELEPHONE ENCOUNTER
Pt is calling for referral for DM eye exam and for itchy and dry eye issues. Pt is wanting something close to [place].

## 2024-11-04 ENCOUNTER — TELEPHONE (OUTPATIENT)
Dept: INTERNAL MEDICINE CLINIC | Facility: CLINIC | Age: 79
End: 2024-11-04

## 2024-11-06 ENCOUNTER — OFFICE VISIT (OUTPATIENT)
Dept: HEMATOLOGY/ONCOLOGY | Facility: HOSPITAL | Age: 79
End: 2024-11-06
Attending: INTERNAL MEDICINE
Payer: MEDICARE

## 2024-11-06 VITALS
WEIGHT: 113.5 LBS | TEMPERATURE: 98 F | DIASTOLIC BLOOD PRESSURE: 83 MMHG | HEART RATE: 76 BPM | RESPIRATION RATE: 16 BRPM | HEIGHT: 62.01 IN | BODY MASS INDEX: 20.62 KG/M2 | SYSTOLIC BLOOD PRESSURE: 159 MMHG | OXYGEN SATURATION: 100 %

## 2024-11-06 DIAGNOSIS — K91.850 POUCHITIS (HCC): Primary | ICD-10-CM

## 2024-11-06 PROCEDURE — 96365 THER/PROPH/DIAG IV INF INIT: CPT

## 2024-11-06 NOTE — PROGRESS NOTES
Education Record    Learner:  Patient    Disease / Diagnosis: pt here for entyvio infusion    Barriers / Limitations:  None   Comments:    Method:  Brief focused   Comments:    General Topics:  Side effects and symptom management   Comments:    Outcome:  Shows understanding   Comments:

## 2024-12-17 ENCOUNTER — LAB ENCOUNTER (OUTPATIENT)
Dept: LAB | Age: 79
End: 2024-12-17
Attending: INTERNAL MEDICINE
Payer: MEDICARE

## 2024-12-17 ENCOUNTER — OFFICE VISIT (OUTPATIENT)
Dept: INTERNAL MEDICINE CLINIC | Facility: CLINIC | Age: 79
End: 2024-12-17
Payer: MEDICARE

## 2024-12-17 VITALS
TEMPERATURE: 98 F | HEART RATE: 78 BPM | SYSTOLIC BLOOD PRESSURE: 126 MMHG | WEIGHT: 116 LBS | OXYGEN SATURATION: 99 % | BODY MASS INDEX: 21.08 KG/M2 | HEIGHT: 62.01 IN | DIASTOLIC BLOOD PRESSURE: 62 MMHG

## 2024-12-17 DIAGNOSIS — I10 PRIMARY HYPERTENSION: ICD-10-CM

## 2024-12-17 DIAGNOSIS — E11.9 TYPE 2 DIABETES MELLITUS WITHOUT COMPLICATION, WITHOUT LONG-TERM CURRENT USE OF INSULIN (HCC): ICD-10-CM

## 2024-12-17 DIAGNOSIS — M81.0 AGE-RELATED OSTEOPOROSIS WITHOUT CURRENT PATHOLOGICAL FRACTURE: ICD-10-CM

## 2024-12-17 DIAGNOSIS — R53.83 OTHER FATIGUE: ICD-10-CM

## 2024-12-17 DIAGNOSIS — Z00.00 ANNUAL PHYSICAL EXAM: ICD-10-CM

## 2024-12-17 DIAGNOSIS — E11.9 TYPE 2 DIABETES MELLITUS WITHOUT COMPLICATION, WITHOUT LONG-TERM CURRENT USE OF INSULIN (HCC): Primary | ICD-10-CM

## 2024-12-17 LAB
ALBUMIN SERPL-MCNC: 4.2 G/DL (ref 3.2–4.8)
ALBUMIN/GLOB SERPL: 1.3 {RATIO} (ref 1–2)
ALP LIVER SERPL-CCNC: 79 U/L
ALT SERPL-CCNC: 17 U/L
ANION GAP SERPL CALC-SCNC: 6 MMOL/L (ref 0–18)
AST SERPL-CCNC: 26 U/L (ref ?–34)
ATRIAL RATE: 65 BPM
BASOPHILS # BLD AUTO: 0.05 X10(3) UL (ref 0–0.2)
BASOPHILS NFR BLD AUTO: 0.9 %
BILIRUB SERPL-MCNC: 0.7 MG/DL (ref 0.2–1.1)
BUN BLD-MCNC: 21 MG/DL (ref 9–23)
CALCIUM BLD-MCNC: 9.3 MG/DL (ref 8.7–10.4)
CHLORIDE SERPL-SCNC: 107 MMOL/L (ref 98–112)
CHOLEST SERPL-MCNC: 111 MG/DL (ref ?–200)
CO2 SERPL-SCNC: 24 MMOL/L (ref 21–32)
CREAT BLD-MCNC: 1.18 MG/DL
EGFRCR SERPLBLD CKD-EPI 2021: 47 ML/MIN/1.73M2 (ref 60–?)
EOSINOPHIL # BLD AUTO: 0.26 X10(3) UL (ref 0–0.7)
EOSINOPHIL NFR BLD AUTO: 4.4 %
ERYTHROCYTE [DISTWIDTH] IN BLOOD BY AUTOMATED COUNT: 15.6 %
EST. AVERAGE GLUCOSE BLD GHB EST-MCNC: 174 MG/DL (ref 68–126)
FASTING PATIENT LIPID ANSWER: NO
FASTING STATUS PATIENT QL REPORTED: NO
GLOBULIN PLAS-MCNC: 3.2 G/DL (ref 2–3.5)
GLUCOSE BLD-MCNC: 161 MG/DL (ref 70–99)
HBA1C MFR BLD: 7.7 % (ref ?–5.7)
HCT VFR BLD AUTO: 28.2 %
HDLC SERPL-MCNC: 62 MG/DL (ref 40–59)
HGB BLD-MCNC: 8.4 G/DL
IMM GRANULOCYTES # BLD AUTO: 0.01 X10(3) UL (ref 0–1)
IMM GRANULOCYTES NFR BLD: 0.2 %
LDLC SERPL CALC-MCNC: 33 MG/DL (ref ?–100)
LYMPHOCYTES # BLD AUTO: 1.75 X10(3) UL (ref 1–4)
LYMPHOCYTES NFR BLD AUTO: 29.9 %
MCH RBC QN AUTO: 21.6 PG (ref 26–34)
MCHC RBC AUTO-ENTMCNC: 29.8 G/DL (ref 31–37)
MCV RBC AUTO: 72.7 FL
MONOCYTES # BLD AUTO: 0.57 X10(3) UL (ref 0.1–1)
MONOCYTES NFR BLD AUTO: 9.7 %
NEUTROPHILS # BLD AUTO: 3.22 X10 (3) UL (ref 1.5–7.7)
NEUTROPHILS # BLD AUTO: 3.22 X10(3) UL (ref 1.5–7.7)
NEUTROPHILS NFR BLD AUTO: 54.9 %
NONHDLC SERPL-MCNC: 49 MG/DL (ref ?–130)
OSMOLALITY SERPL CALC.SUM OF ELEC: 290 MOSM/KG (ref 275–295)
P AXIS: 61 DEGREES
P-R INTERVAL: 166 MS
PLATELET # BLD AUTO: 337 10(3)UL (ref 150–450)
POTASSIUM SERPL-SCNC: 5 MMOL/L (ref 3.5–5.1)
PROT SERPL-MCNC: 7.4 G/DL (ref 5.7–8.2)
Q-T INTERVAL: 386 MS
QRS DURATION: 64 MS
QTC CALCULATION (BEZET): 401 MS
R AXIS: 61 DEGREES
RBC # BLD AUTO: 3.88 X10(6)UL
SODIUM SERPL-SCNC: 137 MMOL/L (ref 136–145)
T AXIS: 22 DEGREES
TRIGL SERPL-MCNC: 82 MG/DL (ref 30–149)
TSI SER-ACNC: 1.68 UIU/ML (ref 0.55–4.78)
VENTRICULAR RATE: 65 BPM
VIT D+METAB SERPL-MCNC: 34.2 NG/ML (ref 30–100)
VLDLC SERPL CALC-MCNC: 11 MG/DL (ref 0–30)
WBC # BLD AUTO: 5.9 X10(3) UL (ref 4–11)

## 2024-12-17 PROCEDURE — 80061 LIPID PANEL: CPT

## 2024-12-17 PROCEDURE — 85025 COMPLETE CBC W/AUTO DIFF WBC: CPT

## 2024-12-17 PROCEDURE — 80053 COMPREHEN METABOLIC PANEL: CPT

## 2024-12-17 PROCEDURE — 36415 COLL VENOUS BLD VENIPUNCTURE: CPT

## 2024-12-17 PROCEDURE — 82306 VITAMIN D 25 HYDROXY: CPT

## 2024-12-17 PROCEDURE — 99397 PER PM REEVAL EST PAT 65+ YR: CPT | Performed by: INTERNAL MEDICINE

## 2024-12-17 PROCEDURE — 93000 ELECTROCARDIOGRAM COMPLETE: CPT | Performed by: INTERNAL MEDICINE

## 2024-12-17 PROCEDURE — 84443 ASSAY THYROID STIM HORMONE: CPT

## 2024-12-17 PROCEDURE — 83036 HEMOGLOBIN GLYCOSYLATED A1C: CPT

## 2024-12-17 RX ORDER — ASPIRIN 81 MG/1
81 TABLET ORAL DAILY
Qty: 90 TABLET | Refills: 3 | Status: SHIPPED | OUTPATIENT
Start: 2024-12-17 | End: 2025-12-12

## 2024-12-17 NOTE — PROGRESS NOTES
Subjective:   Kj Condon is a 79 year old female who presents for a Medicare Initial Annual Wellness visit (Once after 12 month Medicare anniversary)  and scheduled follow up of multiple significant but stable problems.     The patient is here for annual Medicare visit.  The patient did not have any fall this year but had fall in 2023.  Patient has a history of coronary artery disease with stent in the left main artery ( information from the patient) .  She has not been taking baby aspirin for last 1 year.  I advised her that she takes the aspirin.  Her ulcerative colitis is well-controlled now.    Patient works as an  as part-time.  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  History/Other:   Fall Risk Assessment:   She has been screened for Falls and is low risk.      Cognitive Assessment:   She had a completely normal cognitive assessment - see flowsheet entries     Functional Ability/Status:   Kj Condon has a completely normal functional assessment. See flowsheet for details.      Depression Screening (PHQ):  PHQ-2 SCORE: 0  , done 12/17/2024   Last [place] Suicide Screening on 12/17/2024 was No Risk.         Advanced Directives:   She does have a Living Will but we do NOT have it on file in Epic.    She does have a POA but we do NOT have it on file in Epic.    Discussed Advance Care Planning with patient (and family/surrogate if present). Standard forms made available to patient in After Visit Summary.      Patient Active Problem List   Diagnosis    Senile osteoporosis    Coronary atherosclerosis of native coronary artery    Ulcerative (chronic) enterocolitis (HCC)    Hyperlipidemia    Trigger ring finger of right hand    Stented coronary artery    Type 2 diabetes mellitus without complication, without long-term current use of insulin (HCC)    Pouchitis (HCC)    Pruritus    Iron deficiency anemia due to chronic blood loss    Gallstones    Renal stones     Protein-calorie malnutrition, unspecified severity (HCC)    Other osteoporosis without current pathological fracture    Syncope    Gastritis    Essential hypertension     Allergies:  She has No Known Allergies.    Current Medications:  Outpatient Medications Marked as Taking for the 12/17/24 encounter (Office Visit) with Avinash You MD   Medication Sig    Glucose Blood (FREESTYLE LITE TEST) In Vitro Strip Use as directed.    Blood Glucose Monitoring Suppl (ONETOUCH VERIO REFLECT) w/Device Does not apply Kit Take 1 Bottle by mouth As Directed.    Lancets (ONETOUCH DELICA PLUS [id number]) Does not apply Misc 1 strip by In Vitro route daily.    triamcinolone 0.1 % External Cream Apply topically 2 (two) times daily.    glipiZIDE ER 5 MG Oral Tablet 24 Hr     pioglitazone 15 MG Oral Tab 1 tablet (15 mg total).    Glucose Blood (FREESTYLE LITE TEST) In Vitro Strip Tests 1 x daily    Pantoprazole Sodium 40 MG Oral Tab EC Take 1 tablet (40 mg total) by mouth daily. Before meal (Patient taking differently: Take 1 tablet (40 mg total) by mouth every morning before breakfast. Before meal)    Multiple Vitamins-Minerals (TAB-A-SHLOMO) Oral Tab Take 1 tablet by mouth daily.    atorvastatin 40 MG Oral Tab Take 0.5 tablets (20 mg total) by mouth nightly.    atenolol 50 MG Oral Tab Take 0.5 tablets (25 mg total) by mouth daily.    Calcium Carb-Cholecalciferol (CALCIUM + D3) 600-200 MG-UNIT Oral Tab Take 1 tablet by mouth daily.     Current Facility-Administered Medications for the 12/17/24 encounter (Office Visit) with Avinash You MD   Medication    vedolizumab (Entyvio) 300 mg in sodium chloride 0.9% 254.8 mL IVPB       Medical History:  She  has a past medical history of Blood in the stool, CAD (coronary artery disease), Calculus of kidney, Colitis, Constipation, Coronary atherosclerosis of native coronary artery, Diabetes (HCC), Displacement of lumbar intervertebral disc without myelopathy, Dizziness, DVT (deep venous  thrombosis) ([facility]), Frequent use of laxatives, Gall stones, Hemorrhoids, High cholesterol, Hip fracture (HCC) (12/2020), History of blood transfusion, Irregular bowel habits, Kidney stones, Lumbar disc disease, Osteoporosis, Senile osteoporosis, Shortness of breath, Stented coronary artery, Stool incontinence, Stress fracture, left fibula, initial encounter for fracture (06/08/2017), and Weight loss.  Surgical History:  She  has a past surgical history that includes angioplasty (coronary); Colectomy; sigmoidoscopy,diagnostic; lithotripsy; Fracture surgery (Right); lmbr epidural steroid inj, physiatry (internal); and egd.   Family History:  Her family history includes Diabetes in her mother; Heart Surgery in her father.  Social History:  She  reports that she has never smoked. She has never used smokeless tobacco. She reports that she does not drink alcohol and does not use drugs.    Tobacco:  She has never smoked tobacco.    CAGE Alcohol Screen:   CAGE screening score of 0 on 12/17/2024, showing low risk of alcohol abuse.      Patient Care Team:  Ethan Chaparro MD as PCP - General  Camila Sarah MD (CARDIOLOGY)  Marino Pan MD (GASTROENTEROLOGY)  Sam Pozo MD (GASTROENTEROLOGY)  Dewey Bernal MD (GASTROENTEROLOGY)  Antoinette Joya, PT (Physical Therapy)  Sunshine Miranda MD (Hematology and Oncology)  Peter Jorge, PT, DPT, OCS, Cert MDT  as Physical Therapist (Physical Therapy)  Jerilyn Dumont, PT as Physical Therapist (Physical Therapy)  Flaquito Mares, PTA as Physical Therapy Assistant (Physical Therapy)  Reese Sanchez MD (GASTROENTEROLOGY)    Review of Systems     Negative except fatigue.  Cough no chest pain.  Mild pain behind right knee    Objective:   Physical Exam      /62 (BP Location: Right arm, Patient Position: Sitting, Cuff Size: adult)   Pulse 78   Temp 97.7 °F (36.5 °C) (Temporal)   Ht 5' 2.01\" (1.575 m)   Wt 116 lb (52.6 kg)   SpO2 99%   BMI 21.21 kg/m²  Estimated  body mass index is 21.21 kg/m² as calculated from the following:    Height as of this encounter: 5' 2.01\" (1.575 m).    Weight as of this encounter: 116 lb (52.6 kg).  HEENT: Atraumatic normocephalic  CVS: S1-S2 no murmur  Chest: Clear to auscultate  PA: Soft nontender.  There is midline scar which is healed.  Hernial orifices are intact  Legs: No edema  Medicare Hearing Assessment:   Hearing Screening    Time taken: 12/17/2024  2:44 PM  Screening Method: Finger Rub  Finger Rub Result: Pass         Visual Acuity:   Right Eye Visual Acuity: Corrected Right Eye Chart Acuity: 20/30   Left Eye Visual Acuity: Corrected Left Eye Chart Acuity: 20/70   Both Eyes Visual Acuity: Corrected Both Eyes Chart Acuity: 20/30   Able To Tolerate Visual Acuity: Yes        Assessment & Plan:   Kj Condon is a 79 year old female who presents for a Medicare Assessment.     There are no diagnoses linked to this encounter.  The patient indicates understanding of these issues and agrees to the plan.  Patient reassured.  Reinforced healthy diet, lifestyle, and exercise.      No follow-ups on file.     Avinash You MD, 12/17/2024     Supplementary Documentation:   General Health:  In the past six months, have you lost more than 10 pounds without trying?: 2 - No  Has your appetite been poor?: No  Type of Diet: Balanced  How does the patient maintain a good energy level?: Appropriate Exercise;Daily Walks;Stretching  How would you describe your daily physical activity?: Moderate  How would you describe your current health state?: Fair  How do you maintain positive mental well-being?: Visiting Family  On a scale of 0 to 10, with 0 being no pain and 10 being severe pain, what is your pain level?: 1 - (Mild)  In the past six months, have you experienced urine leakage?: 0-No  At any time do you feel concerned for the safety/well-being of yourself and/or your children, in your home or elsewhere?: No  Have you had any immunizations at another  office such as Influenza, Hepatitis B, Tetanus, or Pneumococcal?: Yes    Health Maintenance   Topic Date Due    Zoster Vaccines (2 of 3) 11/10/2017    Diabetes Care A1C  03/20/2024    Diabetes Care Foot Exam  12/19/2024    Annual Physical  12/19/2024    Diabetes Care: Microalb/Creat Ratio  12/20/2024    Diabetes Care: GFR  04/29/2025    Diabetes Care Dilated Eye Exam  10/31/2025    Influenza Vaccine  Completed    DEXA Scan  Completed    Annual Depression Screening  Completed    Fall Risk Screening (Annual)  Completed    Pneumococcal Vaccine: 65+ Years  Completed    COVID-19 Vaccine  Completed    Colorectal Cancer Screening  Discontinued    Mammogram  Discontinued     A/P    1.  Ulcerative colitis: Status post colectomy.  Currently on Entyvio-of biological agent for ulcerative colitis.  It is well-controlled.    2.  Diabetes mellitus: On glipizide and pioglitazone.  Will check A1c.    3.  Hypertension:  well-controlled.  Will check EKG.    4.  Osteoporosis: Check vitamin D levels continue with Prolia    5.  Coronary artery disease with status post stent: Continue with high intensity statin.  The patient currently was not taking aspirin for last 1 year.  I have advised to start low-dose aspirin-enteric-coated.  I prescribed it.    6.  Fall prevention: No falls in 2024.  Treat osteoporosis.    7.  History of iron deficiency anemia: Will check ferritin, TIBC and iron levels.    8.  Fatigue: Check CBC and TSH levels

## 2024-12-18 ENCOUNTER — TELEPHONE (OUTPATIENT)
Dept: INTERNAL MEDICINE CLINIC | Facility: CLINIC | Age: 79
End: 2024-12-18

## 2024-12-18 ENCOUNTER — HOSPITAL ENCOUNTER (EMERGENCY)
Facility: HOSPITAL | Age: 79
Discharge: HOME | End: 2024-12-18
Attending: EMERGENCY MEDICINE
Payer: MEDICARE

## 2024-12-18 ENCOUNTER — TELEPHONE (OUTPATIENT)
Age: 79
End: 2024-12-18

## 2024-12-18 VITALS
BODY MASS INDEX: 20.8 KG/M2 | TEMPERATURE: 96 F | HEART RATE: 79 BPM | RESPIRATION RATE: 22 BRPM | HEIGHT: 62 IN | SYSTOLIC BLOOD PRESSURE: 140 MMHG | OXYGEN SATURATION: 100 % | WEIGHT: 113 LBS | DIASTOLIC BLOOD PRESSURE: 76 MMHG

## 2024-12-18 DIAGNOSIS — D50.9 IRON DEFICIENCY ANEMIA, UNSPECIFIED IRON DEFICIENCY ANEMIA TYPE: Primary | ICD-10-CM

## 2024-12-18 DIAGNOSIS — K51.919 ULCERATIVE COLITIS WITH COMPLICATION, UNSPECIFIED LOCATION (HCC): ICD-10-CM

## 2024-12-18 LAB
ALBUMIN SERPL-MCNC: 4.2 G/DL (ref 3.2–4.8)
ALBUMIN/GLOB SERPL: 1.2 {RATIO} (ref 1–2)
ALP LIVER SERPL-CCNC: 80 U/L
ALT SERPL-CCNC: 18 U/L
ANION GAP SERPL CALC-SCNC: 7 MMOL/L (ref 0–18)
ANTIBODY SCREEN: NEGATIVE
AST SERPL-CCNC: 40 U/L (ref ?–34)
BASOPHILS # BLD AUTO: 0.04 X10(3) UL (ref 0–0.2)
BASOPHILS NFR BLD AUTO: 0.7 %
BILIRUB SERPL-MCNC: 0.9 MG/DL (ref 0.2–1.1)
BUN BLD-MCNC: 17 MG/DL (ref 9–23)
CALCIUM BLD-MCNC: 9.4 MG/DL (ref 8.7–10.4)
CHLORIDE SERPL-SCNC: 105 MMOL/L (ref 98–112)
CO2 SERPL-SCNC: 25 MMOL/L (ref 21–32)
CREAT BLD-MCNC: 1.1 MG/DL
DEPRECATED HBV CORE AB SER IA-ACNC: 7 NG/ML
EGFRCR SERPLBLD CKD-EPI 2021: 51 ML/MIN/1.73M2 (ref 60–?)
EOSINOPHIL # BLD AUTO: 0.26 X10(3) UL (ref 0–0.7)
EOSINOPHIL NFR BLD AUTO: 4.3 %
ERYTHROCYTE [DISTWIDTH] IN BLOOD BY AUTOMATED COUNT: 15.7 %
GLOBULIN PLAS-MCNC: 3.4 G/DL (ref 2–3.5)
GLUCOSE BLD-MCNC: 120 MG/DL (ref 70–99)
HCT VFR BLD AUTO: 31.7 %
HGB BLD-MCNC: 9.4 G/DL
IMM GRANULOCYTES # BLD AUTO: 0.01 X10(3) UL (ref 0–1)
IMM GRANULOCYTES NFR BLD: 0.2 %
IRON SATN MFR SERPL: 1 %
IRON SERPL-MCNC: 6 UG/DL
LYMPHOCYTES # BLD AUTO: 2.08 X10(3) UL (ref 1–4)
LYMPHOCYTES NFR BLD AUTO: 34.3 %
MCH RBC QN AUTO: 21.4 PG (ref 26–34)
MCHC RBC AUTO-ENTMCNC: 29.7 G/DL (ref 31–37)
MCV RBC AUTO: 72.2 FL
MONOCYTES # BLD AUTO: 0.48 X10(3) UL (ref 0.1–1)
MONOCYTES NFR BLD AUTO: 7.9 %
NEUTROPHILS # BLD AUTO: 3.19 X10 (3) UL (ref 1.5–7.7)
NEUTROPHILS # BLD AUTO: 3.19 X10(3) UL (ref 1.5–7.7)
NEUTROPHILS NFR BLD AUTO: 52.6 %
OSMOLALITY SERPL CALC.SUM OF ELEC: 287 MOSM/KG (ref 275–295)
PLATELET # BLD AUTO: 367 10(3)UL (ref 150–450)
POTASSIUM SERPL-SCNC: 5.2 MMOL/L (ref 3.5–5.1)
PROT SERPL-MCNC: 7.6 G/DL (ref 5.7–8.2)
RBC # BLD AUTO: 4.39 X10(6)UL
RH BLOOD TYPE: POSITIVE
SODIUM SERPL-SCNC: 137 MMOL/L (ref 136–145)
TOTAL IRON BINDING CAPACITY: 504 UG/DL (ref 250–425)
TRANSFERRIN SERPL-MCNC: 407 MG/DL (ref 250–380)
WBC # BLD AUTO: 6.1 X10(3) UL (ref 4–11)

## 2024-12-18 PROCEDURE — 86900 BLOOD TYPING SEROLOGIC ABO: CPT | Performed by: EMERGENCY MEDICINE

## 2024-12-18 PROCEDURE — 99284 EMERGENCY DEPT VISIT MOD MDM: CPT

## 2024-12-18 PROCEDURE — 80053 COMPREHEN METABOLIC PANEL: CPT | Performed by: EMERGENCY MEDICINE

## 2024-12-18 PROCEDURE — 82272 OCCULT BLD FECES 1-3 TESTS: CPT

## 2024-12-18 PROCEDURE — 85025 COMPLETE CBC W/AUTO DIFF WBC: CPT | Performed by: EMERGENCY MEDICINE

## 2024-12-18 PROCEDURE — 96365 THER/PROPH/DIAG IV INF INIT: CPT

## 2024-12-18 PROCEDURE — 86901 BLOOD TYPING SEROLOGIC RH(D): CPT | Performed by: EMERGENCY MEDICINE

## 2024-12-18 PROCEDURE — 86850 RBC ANTIBODY SCREEN: CPT | Performed by: EMERGENCY MEDICINE

## 2024-12-18 PROCEDURE — 82728 ASSAY OF FERRITIN: CPT | Performed by: EMERGENCY MEDICINE

## 2024-12-18 PROCEDURE — 83550 IRON BINDING TEST: CPT | Performed by: EMERGENCY MEDICINE

## 2024-12-18 PROCEDURE — 83540 ASSAY OF IRON: CPT | Performed by: EMERGENCY MEDICINE

## 2024-12-18 RX ORDER — FERROUS SULFATE 325(65) MG
325 TABLET, DELAYED RELEASE (ENTERIC COATED) ORAL 2 TIMES DAILY WITH MEALS
Qty: 60 TABLET | Refills: 0 | Status: SHIPPED | OUTPATIENT
Start: 2024-12-18

## 2024-12-18 NOTE — TELEPHONE ENCOUNTER
The patient has symptomatic anemia. Hgb is dropped from 11.2 in July to 8.2.    May benefit from iron infusion.  In the past , it helped her significantly. Patient is unable to reach out to infusion center.     May resolve the problem in two ways     #1- Reach out to infusion center and get iron transfused within 24 hours  # 2- Go to ER, and get iron transfusion.     The patient prefers the later.    I am starting the patient in on iron pills    Will need repeat Hgb in 4 weeks.    The patient is arranging fu with GI. I have advised to make the appointment ASAP.    The patient is expecting our call back as soon as we can.

## 2024-12-18 NOTE — ED INITIAL ASSESSMENT (HPI)
Pt states feeling fatigued and dizzy x 1 week. Pt went for a physical and had labs drawn. Pt received call to come to ER for low hgb. Per chart, hgb was 8.4. Pt states \"they told me I need a transfusion and iron.\"

## 2024-12-18 NOTE — TELEPHONE ENCOUNTER
Got a call back from Beni Funk RN from [facility]. Pt did receive dose IV iron in ER, but it wasn't enough of a dose per Dr. Miranda. Pt is scheduled for an appointment with Dr. Miranda tomorrow for another IV iron infusion tomorrow.    Future Appointments   Date Time Provider Department Center   12/19/2024  2:30 PM NP [place] RN9 NP [facility] Christiana [facility]ILL[facility]   1/2/2025 11:00 AM NP [place] RN2 NP [facility]     TJ: ANAHI

## 2024-12-18 NOTE — ED QUICK NOTES
Noted IV to be infiltrated, IV line to left upper arm d/c'd, warm pack applied to arm.  MD notified

## 2024-12-18 NOTE — TELEPHONE ENCOUNTER
Kj called from the  ER. She said she is getting an iron infusion. Dr Scales told her that she spoke with Dr Miranda. Someone from Dr Miranda's office is suppose to get her an appointment for  tomorrow, Friday or next Monday and arrange a blood transfusion.     I told Kj I will forward the message to Dr Miranda and his nurse now.

## 2024-12-18 NOTE — DISCHARGE INSTRUCTIONS
You should receive a phone call from Dr. Miranda's office within the next 24 hours to schedule you for an evaluation and additional iron infusion either tomorrow or early next week.    Diet and activity as tolerated.

## 2024-12-18 NOTE — ED PROVIDER NOTES
Patient Seen in: [facility] Emergency Department      History     Chief Complaint   Patient presents with    Abnormal Labs     Stated Complaint: low hgb 8.4, needs a blood transfusion, unable to get in.    Subjective:   HPI      79-year-old female with a history of ulcerative colitis status post subtotal colectomy presents to the emergency department for evaluation of anemia.  The patient states that over the past several days she has been feeling lightheaded especially when she stands up and somewhat fatigued.  She had a an annual visit to her primary care physician yesterday and lab work revealed a hemoglobin of 8.4.  Prior hemoglobin was 11.1 in late July of this year.  The patient typically has a handful of formed stools each day but has not noticed a change in the color or consistency.  She is not currently anticoagulated.  No abdominal pain.  She was directed to the emergency department for a blood and iron transfusion.    Objective:     Past Medical History:    Blood in the stool    CAD (coronary artery disease)    Dr. Montero     Calculus of kidney    Colitis    Constipation    Coronary atherosclerosis of native coronary artery    Diabetes (HCC)    Displacement of lumbar intervertebral disc without myelopathy    Dizziness    DVT (deep venous thrombosis) ([facility])    Frequent use of laxatives    Gall stones    Hemorrhoids    High cholesterol    Hip fracture (HCC)    History of blood transfusion    Irregular bowel habits    Kidney stones    Lumbar disc disease    Osteoporosis    Senile osteoporosis    Shortness of breath    Stented coronary artery    Stool incontinence    Stress fracture, left fibula, initial encounter for fracture    Weight loss              Past Surgical History:   Procedure Laterality Date    Angioplasty (coronary)      Colectomy      Egd      Fracture surgery Right     ankle    Lithotripsy      Lmbr epidural steroid inj, physiatry (internal)      Sigmoidoscopy,diagnostic                   Social History     Socioeconomic History    Marital status:    Tobacco Use    Smoking status: Never    Smokeless tobacco: Never   Vaping Use    Vaping status: Never Used   Substance and Sexual Activity    Alcohol use: No     Alcohol/week: 0.0 standard drinks of alcohol    Drug use: No    Sexual activity: Never   Other Topics Concern    Caffeine Concern Yes     Comment: 1 cup tea once per day     Exercise Yes     Comment: x7 days per week    Social History Narrative    The patient does not use an assistive device..      The patient does live in a home with stairs.     Social Drivers of Health     Food Insecurity: Low Risk  (12/22/2023)    Received from [facility] Food Security     Within the past 12 months, the food you bought just didn't last and you didn't have money to get more.: 3     Within the past 12 months, you worried that your food would run out before you got money to buy more.: 3   Transportation Needs: Not At Risk (12/22/2023)    Received from [facility] Transportation Needs     In the past 12 months, has lack of reliable transportation kept you from medical appointments, meetings, work or from getting things needed for daily living?: No   Housing Stability: Not At Risk (12/22/2023)    Received from [facility] Housing     What is your living situation today?: I have a steady place to live     Think about the place you live. Do you have problems with any of the following?: None of the above                  Physical Exam     ED Triage Vitals [12/18/24 1226]   /49   Pulse 79   Resp 17   Temp (!) 96.2 °F (35.7 °C)   Temp src    SpO2 100 %   O2 Device None (Room air)       Current Vitals:   Vital Signs  BP: 140/76  Pulse: 79  Resp: 22  Temp: (!) 96.2 °F (35.7 °C)  MAP (mmHg): 93    Oxygen Therapy  SpO2: 100 %  O2 Device: None (Room air)        Physical Exam     General Appearance: This is an older female lying on a gurney  who is anxious.  Vital signs were reviewed per nurses notes.  Monitor reveals a sinus rhythm rate in the 70s.  Initial blood pressure was 127/49.  HEENT: Normocephalic/atraumatic.  Anicteric sclera.  Oral mucosa is moist.  Oropharynx is normal.  Neck: No adenopathy or thyromegaly.  Lungs are clear to auscultation.  Heart exam: Normal S1-S2 without extra sounds or murmurs.  Regular rate and rhythm.  Abdomen is nontender.  Extremities are atraumatic.  Skin is dry without rashes or lesions.  Neuroexam: Awake and oriented x 4.  Speech is fluent.  Moving all 4 extremities well.  Rectal exam revealed brown stool Hemoccult negative.  ED Course     Labs Reviewed   COMP METABOLIC PANEL (14) - Abnormal; Notable for the following components:       Result Value    Glucose 120 (*)     Potassium 5.2 (*)     Creatinine 1.10 (*)     eGFR-Cr 51 (*)     AST 40 (*)     All other components within normal limits   FERRITIN - Abnormal; Notable for the following components:    Ferritin 7 (*)     All other components within normal limits   IRON AND TIBC - Abnormal; Notable for the following components:    Iron 6 (*)     Transferrin 407 (*)     Total Iron Binding Capacity 504 (*)     % Saturation 1 (*)     All other components within normal limits   CBC WITH DIFFERENTIAL WITH PLATELET   TYPE AND SCREEN    Narrative:     The following orders were created for panel order Type and screen.  Procedure                               Abnormality         Status                     ---------                               -----------         ------                     ABORH (Blood Type)[[id number]]                               In process                 Antibody Screen[[id number]]                                  In process                   Please view results for these tests on the individual orders.   ABORH (BLOOD TYPE)   ANTIBODY SCREEN   RAINBOW DRAW LAVENDER   RAINBOW DRAW LIGHT GREEN   RAINBOW DRAW BLUE            Intravenous access was  obtained.  Laboratory studies were drawn.    Repeat hemoglobin here in the emergency department is pending however iron studies are consistent with an iron deficiency anemia.    Case was discussed with oncologist Dr. Miranda.  Iron infusion was administered in the emergency department.  Outpatient follow-up will be arranged by his office for the patient to be seen within the next several days prior to the holiday.    Exam findings, test results and treatment plan were discussed with the patient prior to discharge.   MDM      #1.  Iron deficiency anemia.  No evidence of active GI bleeding.  Iron infusion administered and oral iron initiated.  Patient will follow-up short-term with hematology/oncology for additional iron evaluation.  Hemoglobin is 8.4 yesterday and not low enough to require blood transfusion at this time.    2.  Ulcerative colitis.        Medical Decision Making      Disposition and Plan     Clinical Impression:  1. Iron deficiency anemia, unspecified iron deficiency anemia type    2. Ulcerative colitis with complication, unspecified location (HCC)         Disposition:  Discharge  12/18/2024  2:43 pm    Follow-up:  Sunshine Miranda MD  [address] [place] [address] [place] [id number]  [phone]    Call in 1 day(s)            Medications Prescribed:  Current Discharge Medication List        START taking these medications    Details   ferrous sulfate 325 (65 FE) MG Oral Tab EC Take 1 tablet (325 mg total) by mouth 2 (two) times daily with meals.  Qty: 60 tablet, Refills: 0                 Supplementary Documentation:

## 2024-12-18 NOTE — TELEPHONE ENCOUNTER
Spoke with patient. Patient scheduled for additional IV iron tomorrow 12/19/24 @2:30 PM at the [facility] location. Patient verbalized understanding.

## 2024-12-18 NOTE — TELEPHONE ENCOUNTER
Attempted to call [facility] to set up appointment for iron infusion. Unable to reach a representative to schedule patient today for an infusion. Will attempt again later.     Outgoing call (waiting) 13 min.

## 2024-12-18 NOTE — TELEPHONE ENCOUNTER
Pt called in and said that she's tried to contact [facility] and wasn't able to get a hold of anyone. Pt is currently in ER trying to get iron infusion with no luck. She is waiting right now and is getting blood tests done (hard stick). Pt cannot walk and is dizzy. Requesting if RN can reach out to [facility] so she can get her iron infusion.    Called [facility] and spoke with representative, Cristy, to schedule patient for symptomatic anemia for IV iron infusion. This RN's call was transferred to charge RN, Blessing, of [facility]. Per charge RN, as it's 1PM, may be difficult to get pt scheduled. [facility] has changed to [facility] now, don't take STAT pt or add on pts. Orders are not placed for iron infusion.    Iron infusion orders still has to go through same process. Orders need to be placed. [employer], iron form, have doctor fill out form, new NPI and tax ID number. The form is for generic iron- if provider has specific type of iron needed to fill out from with type of dosage and frequency. This RN mentioned PCP is not in office at this time. Charge RN offered to reach out to Dr. Miranda's nurse, Tati, for further instructions and orders for iron infusion.     Pt now in a room in ER.     VANDANA: ANAHI

## 2024-12-19 ENCOUNTER — OFFICE VISIT (OUTPATIENT)
Age: 79
End: 2024-12-19
Attending: INTERNAL MEDICINE
Payer: MEDICARE

## 2024-12-19 VITALS — DIASTOLIC BLOOD PRESSURE: 66 MMHG | SYSTOLIC BLOOD PRESSURE: 149 MMHG | HEART RATE: 83 BPM

## 2024-12-19 DIAGNOSIS — D50.0 IRON DEFICIENCY ANEMIA DUE TO CHRONIC BLOOD LOSS: Primary | ICD-10-CM

## 2024-12-19 NOTE — PROGRESS NOTES
Education Record    Learner:  Patient and Spouse    Disease / Diagnosis: pt here for infed    Barriers / Limitations:  None   Comments:    Method:  Understands   Comments:    General Topics:  Side effects and symptom management   Comments:    Outcome:  Shows understanding   Comments:

## 2025-01-02 ENCOUNTER — OFFICE VISIT (OUTPATIENT)
Age: 80
End: 2025-01-02
Attending: INTERNAL MEDICINE
Payer: MEDICARE

## 2025-01-02 VITALS
HEIGHT: 62.01 IN | OXYGEN SATURATION: 100 % | BODY MASS INDEX: 21.19 KG/M2 | DIASTOLIC BLOOD PRESSURE: 82 MMHG | RESPIRATION RATE: 18 BRPM | WEIGHT: 116.63 LBS | TEMPERATURE: 96 F | SYSTOLIC BLOOD PRESSURE: 128 MMHG | HEART RATE: 78 BPM

## 2025-01-02 DIAGNOSIS — K91.850 POUCHITIS (HCC): Primary | ICD-10-CM

## 2025-01-02 NOTE — PROGRESS NOTES
Pt here for Entyvio infusion . Pt denies any issues or concerns.      Ordering Provider: Dr. Reese Sanchez  Order Exp: 3/21/25     Pt tolerated infusion without difficulty or complaint. Reviewed next apt date/time: 8 weeks - 2/27/25      Education Record  Learner:  Patient  Disease / Diagnosis:  ulcerative enterocolitis   Barriers / Limitations:  None  Method:  Discussion and Printed material  General Topics:  Medication, Side effects and symptom management, Plan of care reviewed, and Fall risk and prevention  Outcome:  Shows understanding

## 2025-01-27 ENCOUNTER — APPOINTMENT (OUTPATIENT)
Age: 80
End: 2025-01-27
Attending: INTERNAL MEDICINE
Payer: MEDICARE

## 2025-01-31 DIAGNOSIS — D50.0 IRON DEFICIENCY ANEMIA DUE TO CHRONIC BLOOD LOSS: Primary | ICD-10-CM

## 2025-02-04 ENCOUNTER — HOSPITAL ENCOUNTER (OUTPATIENT)
Dept: LAB | Facility: HOSPITAL | Age: 80
Discharge: HOME | End: 2025-02-04
Attending: INTERNAL MEDICINE
Payer: MEDICARE

## 2025-02-04 DIAGNOSIS — D50.0 IRON DEFICIENCY ANEMIA DUE TO CHRONIC BLOOD LOSS: ICD-10-CM

## 2025-02-04 LAB
BASOPHILS # BLD AUTO: 0.03 X10(3) UL (ref 0–0.2)
BASOPHILS NFR BLD AUTO: 0.6 %
DEPRECATED HBV CORE AB SER IA-ACNC: 184 NG/ML
EOSINOPHIL # BLD AUTO: 0.25 X10(3) UL (ref 0–0.7)
EOSINOPHIL NFR BLD AUTO: 5.1 %
ERYTHROCYTE [DISTWIDTH] IN BLOOD BY AUTOMATED COUNT: 23.4 %
HCT VFR BLD AUTO: 35.8 %
HGB BLD-MCNC: 10.8 G/DL
IMM GRANULOCYTES # BLD AUTO: 0.01 X10(3) UL (ref 0–1)
IMM GRANULOCYTES NFR BLD: 0.2 %
IRON SATN MFR SERPL: 21 %
IRON SERPL-MCNC: 81 UG/DL
LYMPHOCYTES # BLD AUTO: 1.12 X10(3) UL (ref 1–4)
LYMPHOCYTES NFR BLD AUTO: 22.8 %
MCH RBC QN AUTO: 25 PG (ref 26–34)
MCHC RBC AUTO-ENTMCNC: 30.2 G/DL (ref 31–37)
MCV RBC AUTO: 82.9 FL
MONOCYTES # BLD AUTO: 0.34 X10(3) UL (ref 0.1–1)
MONOCYTES NFR BLD AUTO: 6.9 %
NEUTROPHILS # BLD AUTO: 3.16 X10 (3) UL (ref 1.5–7.7)
NEUTROPHILS # BLD AUTO: 3.16 X10(3) UL (ref 1.5–7.7)
NEUTROPHILS NFR BLD AUTO: 64.4 %
PLATELET # BLD AUTO: 271 10(3)UL (ref 150–450)
PLATELET MORPHOLOGY: NORMAL
RBC # BLD AUTO: 4.32 X10(6)UL
TOTAL IRON BINDING CAPACITY: 378 UG/DL (ref 250–425)
TRANSFERRIN SERPL-MCNC: 301 MG/DL (ref 250–380)
WBC # BLD AUTO: 4.9 X10(3) UL (ref 4–11)

## 2025-02-04 PROCEDURE — 82728 ASSAY OF FERRITIN: CPT | Performed by: INTERNAL MEDICINE

## 2025-02-04 PROCEDURE — 83540 ASSAY OF IRON: CPT | Performed by: INTERNAL MEDICINE

## 2025-02-04 PROCEDURE — 83550 IRON BINDING TEST: CPT | Performed by: INTERNAL MEDICINE

## 2025-02-04 PROCEDURE — 85025 COMPLETE CBC W/AUTO DIFF WBC: CPT | Performed by: INTERNAL MEDICINE

## 2025-02-04 PROCEDURE — 36415 COLL VENOUS BLD VENIPUNCTURE: CPT | Performed by: INTERNAL MEDICINE

## 2025-02-26 ENCOUNTER — OFFICE VISIT (OUTPATIENT)
Age: 80
End: 2025-02-26
Attending: INTERNAL MEDICINE
Payer: MEDICARE

## 2025-02-26 VITALS
BODY MASS INDEX: 20.86 KG/M2 | SYSTOLIC BLOOD PRESSURE: 167 MMHG | HEART RATE: 80 BPM | WEIGHT: 114.81 LBS | OXYGEN SATURATION: 100 % | HEIGHT: 62.01 IN | TEMPERATURE: 97 F | RESPIRATION RATE: 16 BRPM | DIASTOLIC BLOOD PRESSURE: 80 MMHG

## 2025-02-26 DIAGNOSIS — K91.850 POUCHITIS (HCC): Primary | ICD-10-CM

## 2025-02-26 NOTE — PROGRESS NOTES
Pt here for entyvio. Pt denies any issues or concerns.      Ordering Provider: Dr. Sanchez  Order Exp: after today's dose. Explained to patient that we will need a new order for next infusion and to contact Dr. Sanchez's office.      Pt tolerated infusion without difficulty or complaint. Reviewed next apt date/time: not scheduled at this time, will need new order      Education Record  Learner:  Patient  Disease / Diagnosis: pouchitis  Barriers / Limitations:  None  Method:  Reinforcement  General Topics:  Medication and Plan of care reviewed  Outcome:  Shows understanding

## 2025-02-27 ENCOUNTER — APPOINTMENT (OUTPATIENT)
Age: 80
End: 2025-02-27
Attending: INTERNAL MEDICINE
Payer: MEDICARE

## 2025-03-05 ENCOUNTER — TELEPHONE (OUTPATIENT)
Age: 80
End: 2025-03-05

## 2025-03-05 NOTE — TELEPHONE ENCOUNTER
Pt is calling and would like to know if the upcoming appt is needed due to she unable to make appt. She needs a appt time around 4 pm.      Please contact patient.   (1) More than 48 hours/None

## 2025-03-10 ENCOUNTER — TELEPHONE (OUTPATIENT)
Age: 80
End: 2025-03-10

## 2025-03-10 NOTE — TELEPHONE ENCOUNTER
Patient called and cancelled follow-up.  I tried to get her reschedule and offered her at least 4 different dates but they did not work for her.

## 2025-03-12 ENCOUNTER — TELEPHONE (OUTPATIENT)
Dept: INTERNAL MEDICINE CLINIC | Facility: CLINIC | Age: 80
End: 2025-03-12

## 2025-03-12 DIAGNOSIS — Z11.1 SCREENING FOR TUBERCULOSIS: ICD-10-CM

## 2025-03-12 NOTE — TELEPHONE ENCOUNTER
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 Patient states that she gets this done every year.     Writer unable to find previous documentation on this. Last chest xray shows from 2021.       Please call back regarding next steps.

## 2025-03-12 NOTE — TELEPHONE ENCOUNTER
Spoke to patient to clarify request. Patient is requesting to complete TB test either blood or skin test as a requirement for work.       Dr. You- Pended Quantiferon TB. Please sign if agree. TY      *ok to send patient mychart message

## 2025-03-13 ENCOUNTER — APPOINTMENT (OUTPATIENT)
Age: 80
End: 2025-03-13
Attending: INTERNAL MEDICINE
Payer: MEDICARE

## 2025-03-17 ENCOUNTER — LAB ENCOUNTER (OUTPATIENT)
Dept: LAB | Facility: HOSPITAL | Age: 80
End: 2025-03-17
Attending: INTERNAL MEDICINE
Payer: MEDICARE

## 2025-03-17 DIAGNOSIS — D50.9 IRON DEFICIENCY ANEMIA, UNSPECIFIED IRON DEFICIENCY ANEMIA TYPE: ICD-10-CM

## 2025-03-17 DIAGNOSIS — Z11.1 SCREENING FOR TUBERCULOSIS: ICD-10-CM

## 2025-03-17 LAB
DEPRECATED HBV CORE AB SER IA-ACNC: 57 NG/ML
IRON SATN MFR SERPL: 13 %
IRON SERPL-MCNC: 48 UG/DL
TOTAL IRON BINDING CAPACITY: 359 UG/DL (ref 250–425)
TRANSFERRIN SERPL-MCNC: 275 MG/DL (ref 250–380)

## 2025-03-17 PROCEDURE — 83550 IRON BINDING TEST: CPT

## 2025-03-17 PROCEDURE — 86480 TB TEST CELL IMMUN MEASURE: CPT

## 2025-03-17 PROCEDURE — 83540 ASSAY OF IRON: CPT

## 2025-03-17 PROCEDURE — 82728 ASSAY OF FERRITIN: CPT

## 2025-03-17 PROCEDURE — 36415 COLL VENOUS BLD VENIPUNCTURE: CPT

## 2025-03-19 LAB
M TB IFN-G CD4+ T-CELLS BLD-ACNC: 0.1 IU/ML
M TB TUBERC IFN-G BLD QL: NEGATIVE
M TB TUBERC IGNF/MITOGEN IGNF CONTROL: >10 IU/ML
QFT TB1 AG MINUS NIL: -0.01 IU/ML
QFT TB2 AG MINUS NIL: -0.02 IU/ML

## 2025-03-21 ENCOUNTER — TELEPHONE (OUTPATIENT)
Age: 80
End: 2025-03-21

## 2025-03-21 NOTE — TELEPHONE ENCOUNTER
Patient is calling to schedule Entyvio infusion.    Patient states her iron has been low, and she states her GI doctor recommend she talk to her hematologist about iron infusions. Please contact patient.

## 2025-04-03 ENCOUNTER — TELEPHONE (OUTPATIENT)
Age: 80
End: 2025-04-03

## 2025-04-04 ENCOUNTER — TELEPHONE (OUTPATIENT)
Age: 80
End: 2025-04-04

## 2025-04-05 ENCOUNTER — ANESTHESIA (OUTPATIENT)
Dept: SURGERY | Facility: HOSPITAL | Age: 80
End: 2025-04-05
Payer: MEDICARE

## 2025-04-05 ENCOUNTER — APPOINTMENT (OUTPATIENT)
Dept: GENERAL RADIOLOGY | Facility: HOSPITAL | Age: 80
End: 2025-04-05
Attending: EMERGENCY MEDICINE
Payer: MEDICARE

## 2025-04-05 ENCOUNTER — HOSPITAL ENCOUNTER (INPATIENT)
Facility: HOSPITAL | Age: 80
LOS: 4 days | Discharge: HOME | End: 2025-04-09
Attending: EMERGENCY MEDICINE | Admitting: STUDENT IN AN ORGANIZED HEALTH CARE EDUCATION/TRAINING PROGRAM
Payer: MEDICARE

## 2025-04-05 ENCOUNTER — APPOINTMENT (OUTPATIENT)
Dept: GENERAL RADIOLOGY | Facility: HOSPITAL | Age: 80
End: 2025-04-05
Attending: UROLOGY
Payer: MEDICARE

## 2025-04-05 ENCOUNTER — ANESTHESIA EVENT (OUTPATIENT)
Dept: SURGERY | Facility: HOSPITAL | Age: 80
End: 2025-04-05
Payer: MEDICARE

## 2025-04-05 ENCOUNTER — APPOINTMENT (OUTPATIENT)
Dept: CT IMAGING | Facility: HOSPITAL | Age: 80
End: 2025-04-05
Attending: EMERGENCY MEDICINE
Payer: MEDICARE

## 2025-04-05 DIAGNOSIS — K92.0 HEMATEMESIS WITH NAUSEA: ICD-10-CM

## 2025-04-05 DIAGNOSIS — R50.81 NEUTROPENIC FEVER: Primary | ICD-10-CM

## 2025-04-05 DIAGNOSIS — N17.9 ACUTE RENAL FAILURE SUPERIMPOSED ON CHRONIC KIDNEY DISEASE, UNSPECIFIED ACUTE RENAL FAILURE TYPE, UNSPECIFIED CKD STAGE: ICD-10-CM

## 2025-04-05 DIAGNOSIS — K80.20 CALCULUS OF GALLBLADDER WITHOUT CHOLECYSTITIS WITHOUT OBSTRUCTION: ICD-10-CM

## 2025-04-05 DIAGNOSIS — L29.9 PRURITUS: ICD-10-CM

## 2025-04-05 DIAGNOSIS — N20.0 KIDNEY STONE: ICD-10-CM

## 2025-04-05 DIAGNOSIS — N18.9 ACUTE RENAL FAILURE SUPERIMPOSED ON CHRONIC KIDNEY DISEASE, UNSPECIFIED ACUTE RENAL FAILURE TYPE, UNSPECIFIED CKD STAGE: ICD-10-CM

## 2025-04-05 DIAGNOSIS — E87.1 HYPONATREMIA: ICD-10-CM

## 2025-04-05 DIAGNOSIS — D70.9 NEUTROPENIC FEVER: Primary | ICD-10-CM

## 2025-04-05 DIAGNOSIS — K50.919 CROHN'S DISEASE WITH COMPLICATION, UNSPECIFIED GASTROINTESTINAL TRACT LOCATION (HCC): ICD-10-CM

## 2025-04-05 DIAGNOSIS — R63.4 WEIGHT LOSS: ICD-10-CM

## 2025-04-05 PROBLEM — R65.21 SEPTIC SHOCK (HCC): Status: ACTIVE | Noted: 2025-04-05

## 2025-04-05 PROBLEM — N30.00 ACUTE CYSTITIS WITHOUT HEMATURIA: Status: ACTIVE | Noted: 2025-04-05

## 2025-04-05 PROBLEM — K51.919 ULCERATIVE COLITIS WITH COMPLICATION (HCC): Status: ACTIVE | Noted: 2025-04-05

## 2025-04-05 PROBLEM — A41.9 SEPTIC SHOCK (HCC): Status: ACTIVE | Noted: 2025-04-05

## 2025-04-05 LAB
ALBUMIN SERPL-MCNC: 4.3 G/DL (ref 3.2–4.8)
ALBUMIN/GLOB SERPL: 1.5 {RATIO} (ref 1–2)
ALP LIVER SERPL-CCNC: 98 U/L
ALT SERPL-CCNC: 18 U/L
ANION GAP SERPL CALC-SCNC: 15 MMOL/L (ref 0–18)
ANTIBODY SCREEN: NEGATIVE
APTT PPP: 33 SECONDS (ref 23–36)
AST SERPL-CCNC: 31 U/L (ref ?–34)
BASOPHILS # BLD AUTO: 0.01 X10(3) UL (ref 0–0.2)
BASOPHILS # BLD AUTO: 0.02 X10(3) UL (ref 0–0.2)
BASOPHILS # BLD AUTO: 0.03 X10(3) UL (ref 0–0.2)
BASOPHILS NFR BLD AUTO: 0.2 %
BASOPHILS NFR BLD AUTO: 0.2 %
BASOPHILS NFR BLD AUTO: 1 %
BILIRUB SERPL-MCNC: 2.4 MG/DL (ref 0.2–1.1)
BILIRUB UR QL STRIP.AUTO: NEGATIVE
BUN BLD-MCNC: 22 MG/DL (ref 9–23)
CALCIUM BLD-MCNC: 10 MG/DL (ref 8.7–10.6)
CHLORIDE SERPL-SCNC: 93 MMOL/L (ref 98–112)
CO2 SERPL-SCNC: 22 MMOL/L (ref 21–32)
COLOR UR AUTO: YELLOW
CREAT BLD-MCNC: 1.86 MG/DL
EGFRCR SERPLBLD CKD-EPI 2021: 27 ML/MIN/1.73M2 (ref 60–?)
EOSINOPHIL # BLD AUTO: 0 X10(3) UL (ref 0–0.7)
EOSINOPHIL # BLD AUTO: 0.01 X10(3) UL (ref 0–0.7)
EOSINOPHIL # BLD AUTO: 0.01 X10(3) UL (ref 0–0.7)
EOSINOPHIL NFR BLD AUTO: 0 %
EOSINOPHIL NFR BLD AUTO: 0.1 %
EOSINOPHIL NFR BLD AUTO: 1 %
ERYTHROCYTE [DISTWIDTH] IN BLOOD BY AUTOMATED COUNT: 14.8 %
ERYTHROCYTE [DISTWIDTH] IN BLOOD BY AUTOMATED COUNT: 14.9 %
FOLATE SERPL-MCNC: 15.2 NG/ML (ref 5.4–?)
GLOBULIN PLAS-MCNC: 2.8 G/DL (ref 2–3.5)
GLUCOSE BLD-MCNC: 134 MG/DL (ref 70–99)
GLUCOSE BLD-MCNC: 134 MG/DL (ref 70–99)
GLUCOSE BLD-MCNC: 148 MG/DL (ref 70–99)
GLUCOSE UR STRIP.AUTO-MCNC: NORMAL MG/DL
HCT VFR BLD AUTO: 25.3 %
HCT VFR BLD AUTO: 27.7 %
HCT VFR BLD AUTO: 28.8 %
HCT VFR BLD AUTO: 34.7 %
HGB BLD-MCNC: 11.4 G/DL
HGB BLD-MCNC: 8.4 G/DL
HGB BLD-MCNC: 9.4 G/DL
HGB BLD-MCNC: 9.6 G/DL
HYALINE CASTS #/AREA URNS AUTO: PRESENT /LPF
IMM GRANULOCYTES # BLD AUTO: 0.02 X10(3) UL (ref 0–1)
IMM GRANULOCYTES # BLD AUTO: 0.07 X10(3) UL (ref 0–1)
IMM GRANULOCYTES # BLD AUTO: 0.17 X10(3) UL (ref 0–1)
IMM GRANULOCYTES NFR BLD: 0.8 %
IMM GRANULOCYTES NFR BLD: 1.3 %
IMM GRANULOCYTES NFR BLD: 2.1 %
INR BLD: 1.24 (ref 0.8–1.2)
LACTATE SERPL-SCNC: 2.2 MMOL/L (ref 0.5–2)
LACTATE SERPL-SCNC: 2.6 MMOL/L (ref 0.5–2)
LACTATE SERPL-SCNC: 7 MMOL/L (ref 0.5–2)
LDH SERPL L TO P-CCNC: 190 U/L
LEUKOCYTE ESTERASE UR QL STRIP.AUTO: 500
LIPASE SERPL-CCNC: 48 U/L (ref 12–53)
LYMPHOCYTES # BLD AUTO: 0.18 X10(3) UL (ref 1–4)
LYMPHOCYTES # BLD AUTO: 0.37 X10(3) UL (ref 1–4)
LYMPHOCYTES # BLD AUTO: 0.5 X10(3) UL (ref 1–4)
LYMPHOCYTES NFR BLD AUTO: 2 %
LYMPHOCYTES NFR BLD AUTO: 2.8 %
LYMPHOCYTES NFR BLD AUTO: 51.5 %
MCH RBC QN AUTO: 26.8 PG (ref 26–34)
MCH RBC QN AUTO: 27 PG (ref 26–34)
MCH RBC QN AUTO: 27 PG (ref 26–34)
MCH RBC QN AUTO: 27.1 PG (ref 26–34)
MCHC RBC AUTO-ENTMCNC: 32.9 G/DL (ref 31–37)
MCHC RBC AUTO-ENTMCNC: 33.2 G/DL (ref 31–37)
MCHC RBC AUTO-ENTMCNC: 33.3 G/DL (ref 31–37)
MCHC RBC AUTO-ENTMCNC: 33.9 G/DL (ref 31–37)
MCV RBC AUTO: 79.8 FL
MCV RBC AUTO: 81.1 FL
MCV RBC AUTO: 81.4 FL
MCV RBC AUTO: 81.6 FL
MONOCYTES # BLD AUTO: 0.01 X10(3) UL (ref 0.1–1)
MONOCYTES # BLD AUTO: 0.1 X10(3) UL (ref 0.1–1)
MONOCYTES # BLD AUTO: 0.56 X10(3) UL (ref 0.1–1)
MONOCYTES NFR BLD AUTO: 1 %
MONOCYTES NFR BLD AUTO: 1.1 %
MONOCYTES NFR BLD AUTO: 4.2 %
NEUTROPHILS # BLD AUTO: 0.42 X10 (3) UL (ref 1.5–7.7)
NEUTROPHILS # BLD AUTO: 0.42 X10(3) UL (ref 1.5–7.7)
NEUTROPHILS # BLD AUTO: 12.17 X10 (3) UL (ref 1.5–7.7)
NEUTROPHILS # BLD AUTO: 12.17 X10(3) UL (ref 1.5–7.7)
NEUTROPHILS # BLD AUTO: 8.52 X10 (3) UL (ref 1.5–7.7)
NEUTROPHILS # BLD AUTO: 8.52 X10(3) UL (ref 1.5–7.7)
NEUTROPHILS NFR BLD AUTO: 43.4 %
NEUTROPHILS NFR BLD AUTO: 91.4 %
NEUTROPHILS NFR BLD AUTO: 95.9 %
NITRITE UR QL STRIP.AUTO: NEGATIVE
OSMOLALITY SERPL CALC.SUM OF ELEC: 275 MOSM/KG (ref 275–295)
PH UR STRIP.AUTO: 5.5 [PH] (ref 5–8)
PLATELET # BLD AUTO: 140 10(3)UL (ref 150–450)
PLATELET # BLD AUTO: 142 10(3)UL (ref 150–450)
PLATELET # BLD AUTO: 146 10(3)UL (ref 150–450)
PLATELET # BLD AUTO: 178 10(3)UL (ref 150–450)
POTASSIUM SERPL-SCNC: 4.2 MMOL/L (ref 3.5–5.1)
PROT SERPL-MCNC: 7.1 G/DL (ref 5.7–8.2)
PROT UR STRIP.AUTO-MCNC: 50 MG/DL
PROTHROMBIN TIME: 15.7 SECONDS (ref 11.6–14.8)
RBC # BLD AUTO: 3.11 X10(6)UL
RBC # BLD AUTO: 3.47 X10(6)UL
RBC # BLD AUTO: 3.55 X10(6)UL
RBC # BLD AUTO: 4.25 X10(6)UL
RH BLOOD TYPE: POSITIVE
SODIUM SERPL-SCNC: 130 MMOL/L (ref 136–145)
SP GR UR STRIP.AUTO: 1.02 (ref 1–1.03)
UROBILINOGEN UR STRIP.AUTO-MCNC: NORMAL MG/DL
VIT B12 SERPL-MCNC: 286 PG/ML (ref 211–911)
WBC # BLD AUTO: 1 X10(3) UL (ref 4–11)
WBC # BLD AUTO: 13.3 X10(3) UL (ref 4–11)
WBC # BLD AUTO: 7.1 X10(3) UL (ref 4–11)
WBC # BLD AUTO: 8.9 X10(3) UL (ref 4–11)
WBC #/AREA URNS AUTO: >50 /HPF
WBC CLUMPS UR QL AUTO: PRESENT /HPF

## 2025-04-05 PROCEDURE — 99291 CRITICAL CARE FIRST HOUR: CPT | Performed by: INTERNAL MEDICINE

## 2025-04-05 PROCEDURE — 0T778DZ DILATION OF LEFT URETER WITH INTRALUMINAL DEVICE, VIA NATURAL OR ARTIFICIAL OPENING ENDOSCOPIC: ICD-10-PCS | Performed by: UROLOGY

## 2025-04-05 PROCEDURE — BT1F1ZZ FLUOROSCOPY OF LEFT KIDNEY, URETER AND BLADDER USING LOW OSMOLAR CONTRAST: ICD-10-PCS | Performed by: UROLOGY

## 2025-04-05 PROCEDURE — 99291 CRITICAL CARE FIRST HOUR: CPT | Performed by: STUDENT IN AN ORGANIZED HEALTH CARE EDUCATION/TRAINING PROGRAM

## 2025-04-05 PROCEDURE — 74177 CT ABD & PELVIS W/CONTRAST: CPT | Performed by: EMERGENCY MEDICINE

## 2025-04-05 PROCEDURE — 71045 X-RAY EXAM CHEST 1 VIEW: CPT | Performed by: EMERGENCY MEDICINE

## 2025-04-05 DEVICE — URETERAL STENT
Type: IMPLANTABLE DEVICE | Site: URETER | Status: FUNCTIONAL
Brand: CONTOUR™

## 2025-04-05 RX ORDER — BISACODYL 10 MG
10 SUPPOSITORY, RECTAL RECTAL
Status: DISCONTINUED | OUTPATIENT
Start: 2025-04-05 | End: 2025-04-09

## 2025-04-05 RX ORDER — METOCLOPRAMIDE HYDROCHLORIDE 5 MG/ML
5 INJECTION INTRAMUSCULAR; INTRAVENOUS EVERY 8 HOURS PRN
Status: DISCONTINUED | OUTPATIENT
Start: 2025-04-05 | End: 2025-04-09

## 2025-04-05 RX ORDER — DEXTROSE MONOHYDRATE 25 G/50ML
50 INJECTION, SOLUTION INTRAVENOUS
Status: DISCONTINUED | OUTPATIENT
Start: 2025-04-05 | End: 2025-04-09

## 2025-04-05 RX ORDER — SODIUM CHLORIDE, SODIUM LACTATE, POTASSIUM CHLORIDE, CALCIUM CHLORIDE 600; 310; 30; 20 MG/100ML; MG/100ML; MG/100ML; MG/100ML
INJECTION, SOLUTION INTRAVENOUS CONTINUOUS PRN
Status: DISCONTINUED | OUTPATIENT
Start: 2025-04-05 | End: 2025-04-05 | Stop reason: SURG

## 2025-04-05 RX ORDER — ZOLPIDEM TARTRATE 5 MG/1
10 TABLET ORAL NIGHTLY PRN
Status: DISCONTINUED | OUTPATIENT
Start: 2025-04-05 | End: 2025-04-05 | Stop reason: SDUPTHER

## 2025-04-05 RX ORDER — METOCLOPRAMIDE HYDROCHLORIDE 5 MG/ML
5 INJECTION INTRAMUSCULAR; INTRAVENOUS EVERY 8 HOURS PRN
Status: DISCONTINUED | OUTPATIENT
Start: 2025-04-05 | End: 2025-04-05

## 2025-04-05 RX ORDER — OXYBUTYNIN CHLORIDE 5 MG/1
5 TABLET ORAL EVERY 6 HOURS PRN
Status: DISCONTINUED | OUTPATIENT
Start: 2025-04-05 | End: 2025-04-09

## 2025-04-05 RX ORDER — ENOXAPARIN SODIUM 100 MG/ML
30 INJECTION SUBCUTANEOUS DAILY
Status: DISCONTINUED | OUTPATIENT
Start: 2025-04-06 | End: 2025-04-09

## 2025-04-05 RX ORDER — NICOTINE POLACRILEX 4 MG
30 LOZENGE BUCCAL
Status: DISCONTINUED | OUTPATIENT
Start: 2025-04-05 | End: 2025-04-09

## 2025-04-05 RX ORDER — SENNOSIDES 8.6 MG
17.2 TABLET ORAL NIGHTLY
Status: DISCONTINUED | OUTPATIENT
Start: 2025-04-05 | End: 2025-04-09

## 2025-04-05 RX ORDER — POLYETHYLENE GLYCOL 3350 17 G/17G
17 POWDER, FOR SOLUTION ORAL DAILY PRN
Status: DISCONTINUED | OUTPATIENT
Start: 2025-04-05 | End: 2025-04-09

## 2025-04-05 RX ORDER — ACETAMINOPHEN 500 MG
1000 TABLET ORAL EVERY 8 HOURS PRN
Status: DISCONTINUED | OUTPATIENT
Start: 2025-04-05 | End: 2025-04-09

## 2025-04-05 RX ORDER — PHENYLEPHRINE HCL 10 MG/ML
VIAL (ML) INJECTION AS NEEDED
Status: DISCONTINUED | OUTPATIENT
Start: 2025-04-05 | End: 2025-04-05 | Stop reason: SURG

## 2025-04-05 RX ORDER — SODIUM CHLORIDE, SODIUM LACTATE, POTASSIUM CHLORIDE, CALCIUM CHLORIDE 600; 310; 30; 20 MG/100ML; MG/100ML; MG/100ML; MG/100ML
INJECTION, SOLUTION INTRAVENOUS CONTINUOUS
Status: DISCONTINUED | OUTPATIENT
Start: 2025-04-05 | End: 2025-04-07

## 2025-04-05 RX ORDER — CEFAZOLIN SODIUM 1 G/3ML
INJECTION, POWDER, FOR SOLUTION INTRAMUSCULAR; INTRAVENOUS AS NEEDED
Status: DISCONTINUED | OUTPATIENT
Start: 2025-04-05 | End: 2025-04-05 | Stop reason: SURG

## 2025-04-05 RX ORDER — NICOTINE POLACRILEX 4 MG
15 LOZENGE BUCCAL
Status: DISCONTINUED | OUTPATIENT
Start: 2025-04-05 | End: 2025-04-09

## 2025-04-05 RX ORDER — ECHINACEA PURPUREA EXTRACT 125 MG
1 TABLET ORAL
Status: DISCONTINUED | OUTPATIENT
Start: 2025-04-05 | End: 2025-04-09

## 2025-04-05 RX ORDER — ONDANSETRON 2 MG/ML
4 INJECTION INTRAMUSCULAR; INTRAVENOUS EVERY 6 HOURS PRN
Status: DISCONTINUED | OUTPATIENT
Start: 2025-04-05 | End: 2025-04-09

## 2025-04-05 RX ORDER — ENOXAPARIN SODIUM 100 MG/ML
40 INJECTION SUBCUTANEOUS DAILY
Status: DISCONTINUED | OUTPATIENT
Start: 2025-04-06 | End: 2025-04-05

## 2025-04-05 RX ORDER — ONDANSETRON 2 MG/ML
4 INJECTION INTRAMUSCULAR; INTRAVENOUS EVERY 6 HOURS PRN
Status: DISCONTINUED | OUTPATIENT
Start: 2025-04-05 | End: 2025-04-05

## 2025-04-05 RX ORDER — BENZONATATE 100 MG/1
200 CAPSULE ORAL 3 TIMES DAILY PRN
Status: DISCONTINUED | OUTPATIENT
Start: 2025-04-05 | End: 2025-04-09

## 2025-04-05 RX ORDER — ONDANSETRON 2 MG/ML
4 INJECTION INTRAMUSCULAR; INTRAVENOUS EVERY 4 HOURS PRN
Status: DISCONTINUED | OUTPATIENT
Start: 2025-04-05 | End: 2025-04-05

## 2025-04-05 RX ORDER — VANCOMYCIN HYDROCHLORIDE
25 ONCE
Status: COMPLETED | OUTPATIENT
Start: 2025-04-05 | End: 2025-04-05

## 2025-04-05 RX ORDER — SODIUM CHLORIDE 450 MG/100ML
INJECTION, SOLUTION INTRAVENOUS CONTINUOUS
Status: DISCONTINUED | OUTPATIENT
Start: 2025-04-05 | End: 2025-04-05

## 2025-04-05 RX ORDER — SENNOSIDES 8.6 MG
17.2 TABLET ORAL NIGHTLY PRN
Status: DISCONTINUED | OUTPATIENT
Start: 2025-04-05 | End: 2025-04-09

## 2025-04-05 RX ORDER — ATORVASTATIN CALCIUM 20 MG/1
20 TABLET, FILM COATED ORAL NIGHTLY
Status: DISCONTINUED | OUTPATIENT
Start: 2025-04-05 | End: 2025-04-09

## 2025-04-05 RX ORDER — ZOLPIDEM TARTRATE 5 MG/1
5 TABLET ORAL NIGHTLY PRN
Status: DISCONTINUED | OUTPATIENT
Start: 2025-04-05 | End: 2025-04-09

## 2025-04-05 RX ADMIN — PHENYLEPHRINE HCL 200 MCG: 10 MG/ML VIAL (ML) INJECTION at 20:18:00

## 2025-04-05 RX ADMIN — SODIUM CHLORIDE, SODIUM LACTATE, POTASSIUM CHLORIDE, CALCIUM CHLORIDE: 600; 310; 30; 20 INJECTION, SOLUTION INTRAVENOUS at 20:01:00

## 2025-04-05 RX ADMIN — CEFAZOLIN SODIUM 2 G: 1 INJECTION, POWDER, FOR SOLUTION INTRAMUSCULAR; INTRAVENOUS at 20:09:00

## 2025-04-05 NOTE — ED PROVIDER NOTES
Patient Seen in: [facility] Emergency Department      History     Chief Complaint   Patient presents with    Syncope    Fever     Stated Complaint: syncope    Subjective:   HPI      79-year-old female who has a known history of Crohn's disease presents to the emergency department with fever for the last few days and just not feeling well.  She is feeling lightheaded today.  She has had some intermittent vomiting.  She states initially her pain was in her abdomen in the left lower quadrant but now she is having epigastric pain.  She states that she was taking medication for her abdominal pain that was Tylenol.  I could not confirm if she had taken any NSAIDs.  She states that she has had issues with an ulcer in the past.  She states she has had ongoing issues with anemia and did require transfusion.  She believes her last transfusion was around February.  No significant cough cold or congestion.    Patient was brought in by car by her .  Patient was very pale and had an episode of hematemesis/coffee-ground emesis and was near syncopal.  She did not lose consciousness.    Objective:     No pertinent past medical history.            No pertinent past surgical history.              No pertinent social history.                Physical Exam     ED Triage Vitals [04/05/25 1401]   /61   Pulse 91   Resp 19   Temp 97.5 °F (36.4 °C)   Temp src Oral   SpO2 98 %   O2 Device None (Room air)       Current Vitals:   Vital Signs  BP: 113/70  Pulse: 91  Resp: 23  Temp: 99.8 °F (37.7 °C)  Temp src: Oral  MAP (mmHg): 84    Oxygen Therapy  SpO2: 100 %  O2 Device: None (Room air)        Physical Exam  Vitals and nursing note reviewed.   Constitutional:       Appearance: She is well-developed. She is ill-appearing.   HENT:      Head: Normocephalic and atraumatic.   Cardiovascular:      Rate and Rhythm: Normal rate and regular rhythm.      Pulses: Normal pulses.      Heart sounds: Normal heart sounds.   Pulmonary:       Effort: Pulmonary effort is normal.      Breath sounds: Normal breath sounds. No stridor. No wheezing.   Abdominal:      General: Bowel sounds are normal.      Palpations: Abdomen is soft.      Tenderness: There is abdominal tenderness. There is no rebound.      Comments: Patient has some minimal epigastric tenderness but states that her more severe pain was in the left lower quadrant.  At this time I am unable to reproduce pain with deep palpation on that left side   Musculoskeletal:         General: No tenderness. Normal range of motion.      Cervical back: Normal range of motion and neck supple.   Lymphadenopathy:      Cervical: No cervical adenopathy.   Skin:     General: Skin is warm and dry.      Coloration: Skin is pale.   Neurological:      General: No focal deficit present.      Mental Status: She is alert and oriented to person, place, and time.      Cranial Nerves: No cranial nerve deficit.      Coordination: Coordination normal.            ED Course     Labs Reviewed   COMP METABOLIC PANEL (14) - Abnormal; Notable for the following components:       Result Value    Glucose 134 (*)     Sodium 130 (*)     Chloride 93 (*)     Creatinine 1.86 (*)     eGFR-Cr 27 (*)     Bilirubin, Total 2.4 (*)     All other components within normal limits   CBC WITH DIFFERENTIAL WITH PLATELET - Abnormal; Notable for the following components:    WBC 1.0 (*)     HGB 11.4 (*)     HCT 34.7 (*)     Neutrophil Absolute Prelim 0.42 (*)     Neutrophil Absolute 0.42 (*)     Lymphocyte Absolute 0.50 (*)     Monocyte Absolute 0.01 (*)     All other components within normal limits   URINALYSIS WITH CULTURE REFLEX - Abnormal; Notable for the following components:    Clarity Urine Turbid (*)     Ketones Urine Trace (*)     Blood Urine 1+ (*)     Protein Urine 50 (*)     Leukocyte Esterase Urine 500 (*)     WBC Urine >50 (*)     RBC Urine 6-10 (*)     Bacteria Urine Rare (*)     Squamous Epi. Cells Few (*)     Transitional Cells Moderate  (*)     Hyaline Casts Present (*)     WBC Clump Present (*)     All other components within normal limits   LACTIC ACID, PLASMA - Abnormal; Notable for the following components:    Lactic Acid 7.0 (*)     All other components within normal limits   LACTIC ACID REFLEX POST POSTIVE - Abnormal; Notable for the following components:    Lactic Acid 2.2 (*)     All other components within normal limits   PROTHROMBIN TIME (PT) - Abnormal; Notable for the following components:    PT 15.7 (*)     INR 1.24 (*)     All other components within normal limits   CBC, PLATELET; NO DIFFERENTIAL - Abnormal; Notable for the following components:    RBC 3.47 (*)     HGB 9.4 (*)     HCT 27.7 (*)     .0 (*)     MCV 79.8 (*)     All other components within normal limits   CBC WITH DIFFERENTIAL WITH PLATELET - Abnormal; Notable for the following components:    RBC 3.11 (*)     HGB 8.4 (*)     HCT 25.3 (*)     .0 (*)     Neutrophil Absolute Prelim 8.52 (*)     Neutrophil Absolute 8.52 (*)     Lymphocyte Absolute 0.18 (*)     All other components within normal limits   POCT GLUCOSE - Abnormal; Notable for the following components:    POC Glucose 134 (*)     All other components within normal limits   LIPASE - Normal   LDH - Normal   PTT, ACTIVATED - Normal   VITAMIN B12 - Normal   FOLIC ACID SERUM(FOLATE) - Normal   SCAN SLIDE   SCAN SLIDE   LACTIC ACID REFLEX POST POSITIVE [id number]   TYPE AND SCREEN    Narrative:     The following orders were created for panel order Type and screen.  Procedure                               Abnormality         Status                     ---------                               -----------         ------                     ABORH (Blood Type)[[id number]]                               Final result               Antibody Screen[[id number]]                                  Final result                 Please view results for these tests on the individual orders.   ABORH (BLOOD TYPE)   ANTIBODY SCREEN    TYPE AND SCREEN    Narrative:     The following orders were created for panel order Type and screen.  Procedure                               Abnormality         Status                     ---------                               -----------         ------                     ABORH (Blood Type)[[id number]]                               In process                 Antibody Screen[[id number]]                                  In process                   Please view results for these tests on the individual orders.   ABORH (BLOOD TYPE)   ANTIBODY SCREEN   RAINBOW DRAW LAVENDER   RAINBOW DRAW LIGHT GREEN   RAINBOW DRAW BLUE   BLOOD CULTURE   BLOOD CULTURE   URINE CULTURE, ROUTINE     EKG    Rate, intervals and axes as noted on EKG Report.  Rate: 92  Rhythm: Sinus Rhythm  Reading: No acute ST segment changes                XR CHEST AP PORTABLE  (CPT=71045)    Result Date: 4/5/2025  PROCEDURE:  XR CHEST AP PORTABLE  (CPT=71045)  TECHNIQUE:  AP chest radiograph was obtained.  COMPARISON:  [facility], XR, XR CHEST PA + LAT CHEST (CPT=71046), 12/20/2022, 11:48 AM.  [facility] , XR, XR CHEST AP PORTABLE  (CPT=71045), 3/04/2021, 5:06 PM.  INDICATIONS:  syncope  PATIENT STATED HISTORY: (As transcribed by Technologist)  Per family, patient has had a fever along with nausea and vomiting.    FINDINGS:  Stable cardiac size.  Small left-sided pleural effusion with minimal left basilar atelectasis/infiltrates.  Hyperexpansion of the lungs.  Atheromatous calcifications of the aorta.            CONCLUSION:  1. Hyperexpansion of the lungs. 2. Small left-sided pleural effusion with minimal left basilar atelectasis/infiltrates.    LOCATION:  [facility]      Dictated by (CST): Luis Armando Dash MD on 4/05/2025 at 3:31 PM     Finalized by (CST): Luis Armando Dash MD on 4/05/2025 at 3:31 PM            MDM      Patient had an IV established and blood work obtained.  We initiated fluid resuscitation.  Overall patient is some what ill-appearing.  She is  pale.  However, her hemoglobin is higher than her baseline and she is most likely volume contracted as her BUN and creatinine are higher than her baseline as well.  Patient was found to be markedly neutropenic.  There was concern that she was having neutropenic fevers and could potentially be higher risk of sepsis particularly with the medications that she is on for Crohn's disease.  However, we will repeated a CBC her hemoglobin had dropped 2 g but her white count was completely normal.  Subsequently was concerned that there was issues with one of the specimens so a third specimen was obtained.  She continues to have a stable white count on this with mild decrease in her hemoglobin.  She has not had any further episodes of hematemesis.  Regardless we did do sepsis protocol she received a 30/kg bolus and had dropped her blood pressure despite near completion of the 30/kg bolus and subsequently was started on Levophed.  She was given Zosyn.  Patient had a urinalysis that had greater than 50 WBCs and certainly this was a concern of a possible infectious etiology and her fever.  With her having a history of Crohn's disease as well as the urinalysis there is concern with potential pyelonephritis obstructing stone or potential abscess or perforation related to her Crohn's disease.  She underwent a CT scan of her abdomen pelvis.  I reviewed the films not appreciating obvious free air.  Radiology report she was noted to have some incidental gallstones she is not have any right upper quadrant pain.  She does not have any elevation in her liver function test.  She had incidental isolated elevated bilirubin but there is no signs of ductal dilatation on CT.  Patient did however have a 5 mm stone on the left.  This coupled with her fever greater than 50 WBCs in her urine and signs concerning for sepsis is most likely the etiology.  Urology was contacted and will be taking patient to the OR.  Prior to all this I had contacted  critical care as well as the [facility].  We had contacted hematology with the initial CBC.  We also contacted gastroenterology for combination of her Crohn's and the episode of hematemesis.  She was given a dose of Protonix and had no further episodes of hematemesis.  Patient continues to be mentating normally and appears comfortable.  Is anticipated that she will go to the OR and then to the ICU    A total of 55 minutes of critical care time (exclusive of billable procedures) was administered to manage the patient's unstable vital signs due to her sepsis and multiple medical issues.  This involved direct patient intervention, complex decision making, and/or extensive discussions with the patient, family, and clinical staff.      Admission disposition: 4/5/2025  4:35 PM           Medical Decision Making      Disposition and Plan     Clinical Impression:  1. Neutropenic fever    2. Crohn's disease with complication, unspecified gastrointestinal tract location (HCC)    3. Hematemesis with nausea    4. Hyponatremia    5. Acute renal failure superimposed on chronic kidney disease, unspecified acute renal failure type, unspecified CKD stage    6. Kidney stone    7. Calculus of gallbladder without cholecystitis without obstruction         Disposition:  Admit  4/5/2025  4:35 pm    Follow-up:  No follow-up provider specified.        Medications Prescribed:  Current Discharge Medication List              Supplementary Documentation:     [facility]   part of [facility]      Sepsis Reassessment Note    /56   Pulse 88   Temp 99.8 °F (37.7 °C) (Oral)   Resp 21   Ht 160 cm (5' 3\")   Wt 52.2 kg   SpO2 98%   BMI 20.37 kg/m²      I completed the sepsis reassessment at 18:55    Cardiac:  Regularity: Regular  Rate: Normal  Heart Sounds: S1,S2    Lungs:   Right: Clear  Left: Clear    Peripheral Pulses:  Radial: Left 1+      Capillary Refill:  <3 Secs    Skin:  Temp/Moisture: Warm and Dry  Color: Normal        Josie Dawson MD  4/5/2025  5:10 PM             Hospital Problems       Present on Admission  Date Reviewed: 12/17/2024            ICD-10-CM Noted POA    * (Principal) Neutropenic fever D70.9, R50.81 4/5/2025 Unknown    Acute cystitis without hematuria N30.00 4/5/2025 Unknown    Hematemesis with nausea K92.0 4/5/2025 Unknown    Septic shock (HCC) A41.9, R65.21 4/5/2025 Unknown    Ulcerative colitis with complication (HCC) K51.919 4/5/2025 Unknown

## 2025-04-05 NOTE — CONSULTS
[facility] Pulmonary Critical Care Consult Note    NAME: Kj Condon - ROOM: A8/A8 - MRN: [id number] - Age: 79 year old - :10/15/1945    History Of Present Illness:  Kj Condon is a 79 year old female with PMHx significant for crohn's disease who presents with 3-4 days of generally feeling unwell and left lower quadrant abdominal pain.  Patient noticed temp of 100.2 at home, initialyl got better with tylenol then had recurrent significant chills and feeling unwell so came in.    Past Medical History:  Past Medical History:    Blood in the stool    CAD (coronary artery disease)    Dr. Montero     Calculus of kidney    Colitis    Constipation    Coronary atherosclerosis of native coronary artery    Diabetes (HCC)    Displacement of lumbar intervertebral disc without myelopathy    Dizziness    DVT (deep venous thrombosis) ([facility])    Frequent use of laxatives    Gall stones    Hemorrhoids    High cholesterol    Hip fracture ([facility])    History of blood transfusion    Irregular bowel habits    Kidney stones    Lumbar disc disease    Osteoporosis    Senile osteoporosis    Shortness of breath    Stented coronary artery    Stool incontinence    Stress fracture, left fibula, initial encounter for fracture    Weight loss     Past Surgical History:   Past Surgical History:   Procedure Laterality Date    Angioplasty (coronary)      Colectomy      Egd      Fracture surgery Right     ankle    Lithotripsy      Lmbr epidural steroid inj, physiatry (internal)      Sigmoidoscopy,diagnostic        Family History:   Family History   Problem Relation Age of Onset    Heart Surgery Father         CABG    Diabetes Mother         Mellitus      Social History:    reports that she has never smoked. She has never used smokeless tobacco. She reports that she does not drink alcohol and does not use drugs.     Review of Systems:   A comprehensive 10 point review of systems was completed.  Pertinent positives and negatives noted in the  HPI.    Current Facility-Administered Medications   Medication Dose Route Frequency    norepinephrine (Levophed) 4 mg/250mL infusion premix  0.5-50 mcg/min Intravenous Continuous    vedolizumab (Entyvio) 300 mg in sodium chloride 0.9% 254.8 mL IVPB  300 mg Intravenous Once     OBJECTIVE  Vitals:  /56   Pulse 88   Temp 99.8 °F (37.7 °C) (Oral)   Resp 21   Ht 5' 3\" (1.6 m)   Wt 115 lb (52.2 kg)   SpO2 98%   BMI 20.37 kg/m²             Physical Exam:    General Appearance: appears pale, in no distress but sick  Neck: no adenopathy, no carotid bruit, no JVD, supple, symmetrical, trachea midline, and thyroid not enlarged, symmetric, no tenderness/mass/nodules  Lungs: clear to auscultation bilaterally  Heart: regular rate and rhythm, S1, S2 normal, no murmur, click, rub or gallop  Abdomen:  mildly tender diffusely but no guarding/rigidity  Extremities: Atraumatic, no cyanosis or edema  Neurologic: Grossly normalCNII-XII intact, normal strength    Data this admission:  XR CHEST AP PORTABLE  (CPT=71045)    Result Date: 4/5/2025  CONCLUSION:  1. Hyperexpansion of the lungs. 2. Small left-sided pleural effusion with minimal left basilar atelectasis/infiltrates.    LOCATION:  [facility]      Dictated by (CST): Luis Armando Dash MD on 4/05/2025 at 3:31 PM     Finalized by (CST): Luis Armando Dash MD on 4/05/2025 at 3:31 PM       Labs:  Lab Results   Component Value Date    WBC 1.0 04/05/2025    HGB 11.4 04/05/2025    HCT 34.7 04/05/2025    .0 04/05/2025    CREATSERUM 1.86 04/05/2025    BUN 22 04/05/2025     04/05/2025    K 4.2 04/05/2025    CL 93 04/05/2025    CO2 22.0 04/05/2025     04/05/2025    CA 10.0 04/05/2025    ALB 4.3 04/05/2025    ALKPHO 98 04/05/2025    BILT 2.4 04/05/2025    TP 7.1 04/05/2025    AST 31 04/05/2025    ALT 18 04/05/2025    PTT 33.0 04/05/2025    INR 1.24 04/05/2025       Assessment/Plan:    Septic Shock 2/2 presumed gram negative sepsis, UTI positive, possible intraabdominal  source as well   - Sepsis protocol begun   - Continue broad spectrum antibiotics agree with cefepime   - Follow cultures   - Follow up CT abdomen/pelvis   - Goal MAP > 65   - Serial lactates    Neutropenic sepsis   - management as above    Possible hemeatemsis?   - ER noted melena but family saying no blood on vomit   - GI consulted for crohn's disease, appreciate their input   - likely to trend hgb and scoe if rebleeds    UC on entyvio as outpatient   - consult GI appreciate recs    Near syncope   - probable due to sepsis    VIC   - likely prerenal   - received 30 ml/kg fluid bolus    UTI noted on UA   - on cefepime      Dispo:     Code Status: Prior  -ICU for close monitoring      Upon my evaluation, this patient had a high probability of imminent or life-threatening deterioration due to sepsis, which required my direct attention, intervention, and personal management.    I have personally provided 40 minutes of critical care time, exclusive of time spent on separately billable procedures.  Time includes review of all pertinent laboratory/radiology results, discussion with consultants, and monitoring for potential decompensation.  Performed interventions included fluids and pressor drugs.      The 21st Century Cures Act makes medical notes like these available to patients in the interest of transparency. Please be advised this is a medical document. Medical documents are intended to carry relevant information, facts as evident, and the clinical opinion of the practitioner. The medical note is intended as peer to peer communication and may appear blunt or direct. It is written in medical language and may contain abbreviations or verbiage that are unfamiliar.

## 2025-04-05 NOTE — ED INITIAL ASSESSMENT (HPI)
PT arrives via car, lethargic, near syncope at . PT's  states that the PT has been presenting fever ( yesterday), currently taking antibiotics to treat C Duff. Gastric infection. PT's  states weakness, about to pass out, dizzy prior to arrival.

## 2025-04-05 NOTE — CONSULTS
Gastroenterology Initial Consultation    Kj Condon Patient Status:  Emergency    10/15/1945 MRN [id number]   [facility] EMERGENCY DEPARTMENT Attending Josie Dawson MD   Hosp Day # 0 PCP Avinash You MD       Reason for Consultation/Chief Complaint: Coffee ground emesis, history of UC/Crohn's     Date: 25    History of Present Illness:    Ms. Kj Condon is a 79 year-old female being seen in consultation for coffee ground emesis and history of UC/Crohn's. Patient admitted with near syncope and fever found to be in septic shock likely due to UTI. Patient hypotensive, elevated lactic acid requiring pressor support. ICU consulted. Abx initiated. Patient also to have neutropenia. Heme consulted. Patient reported to have coffee ground emesis in ED. Hgb stable. No further hematemesis. Last EGD 2024 w/ mild gastritis. Patient has known long standing history of UC s/p colectomy w/ IPAA c/b recurrent pouchitis requiring multiple rounds of abx eventual initiation of Entyvio with significant clinical benefit. She also has history of anal stricture requiring anal dilation under anesthesia at [facility].     History:  Past Medical History:    Blood in the stool    CAD (coronary artery disease)    Dr. Montero     Calculus of kidney    Colitis    Constipation    Coronary atherosclerosis of native coronary artery    Diabetes (HCC)    Displacement of lumbar intervertebral disc without myelopathy    Dizziness    DVT (deep venous thrombosis) (Formerly [facility])    Frequent use of laxatives    Gall stones    Hemorrhoids    High cholesterol    Hip fracture (HCC)    History of blood transfusion    Irregular bowel habits    Kidney stones    Lumbar disc disease    Osteoporosis    Senile osteoporosis    Shortness of breath    Stented coronary artery    Stool incontinence    Stress fracture, left fibula, initial encounter for fracture    Weight loss     Past Surgical History:   Procedure Laterality Date    Angioplasty  (coronary)      Colectomy      Egd      Fracture surgery Right     ankle    Lithotripsy      Lmbr epidural steroid inj, physiatry (internal)      Sigmoidoscopy,diagnostic       Family History   Problem Relation Age of Onset    Heart Surgery Father         CABG    Diabetes Mother         Mellitus       reports that she has never smoked. She has never used smokeless tobacco. She reports that she does not drink alcohol and does not use drugs.    Allergies:  Allergies[1]    Medications:    Current Facility-Administered Medications:     norepinephrine (Levophed) 4 mg/250mL infusion premix, 0.5-50 mcg/min, Intravenous, Continuous    vedolizumab (Entyvio) 300 mg in sodium chloride 0.9% 254.8 mL IVPB, 300 mg, Intravenous, Once    Review of Systems:    Except for what is noted on the HPI the remainder 10 point review of systems is negative.    Gastrointestinal: Except for what is noted on the HPI the remainder 10 point review of systems is negative  General: Denies fatigue, chills/fever, night sweats, weight loss, loss of appetite, weight gain, sleep disturbance.  Neurological: Denies frequent headaches, recent passing out, recent dizziness, convulsions/seizures.  Cardiovascular: Denies history of heart murmur, leg swelling, chest pain or pressure after eating or when upset, angina, irregular heart rate/palpitations.  Respiratory: Denies shortness of breath, chronic/frequent hoarseness, wheezing, chronic cough, spitting up blood, cough up sputum.  Genitourinary: Denies painful/difficult urination, frequent urinary infections, frequent urination, blood in urine, incontinence.  Psychosocial: Denies usually feeling lonely or depressed, denies anxiety, stress, history of eating disorder.  Skin: Denies severe itching, rash, flushing, change in hair or nails.  Bone/joint: Denies back pain, joint pain.  Heme/Lymphatic: Denies easy bruising, excessive bleeding, enlarging or painful lymph nodes.  Allergy: Denies latex/rubber allergy,  anaphylactic or other reaction to anesthesia, food allergy.   Eyes: Denies blurred/double vision, eye disease, glasses or contacts, glaucoma.  ENT: Denies nose or gums bleeding, mouth sores, bad breath or bad taste in mouth, hearing loss.      Physical Exam:    Blood pressure 100/56, pulse 88, temperature 99.8 °F (37.7 °C), temperature source Oral, resp. rate 21, height 5' 3\" (1.6 m), weight 115 lb (52.2 kg), SpO2 98%, not currently breastfeeding.    Constitutional: Normal body habitus  Ears, Nose, Mouth, Throat: Normal appearance of nose and ears. Normal appearance of lips, teeth, gums and oral mucosa  Neck: Normal neck inspection  Respiratory: Normal respiratory effort  Cardiovascular: no lower extremity edema  Gastrointestinal: Soft, mild discomfort centrally, non-guarded on palpation. No mass or hernia palpated.   Skin: No rashes, induration, nodules or tightening of examined abdominal skin  Psychiatric: Normal judgement and insight. Normal mood and affect.    Data:  Relevant labs, imaging and medications reviewed.     Lab Results   Component Value Date    WBC 7.1 04/05/2025    HGB 9.4 04/05/2025    HCT 27.7 04/05/2025    .0 04/05/2025    CREATSERUM 1.86 04/05/2025    BUN 22 04/05/2025     04/05/2025    K 4.2 04/05/2025    CL 93 04/05/2025    CO2 22.0 04/05/2025     04/05/2025    CA 10.0 04/05/2025    ALB 4.3 04/05/2025    ALKPHO 98 04/05/2025    BILT 2.4 04/05/2025    TP 7.1 04/05/2025    AST 31 04/05/2025    ALT 18 04/05/2025    PTT 33.0 04/05/2025    INR 1.24 04/05/2025    PTP 15.7 04/05/2025    LIP 48 04/05/2025    PGLU 134 04/05/2025           Assessment/Plan:    Coffee Ground Emesis  -suspect related to nausea/vomiting in setting of UTI  -no role for urgent EGD given stable hgb, vitals and infectious instrumentation risk given neutropenia   -cnt to monitor hgb  -IV PPI BID    History of pouchitis/Crohn's w/ anal stricture  -CT A/P pending rule out intra-abdominal source     UTI  -cnt IV  abx, supportive care ,trend lactic acid  -appreciate icu eval     Neutropenia  -suspect due to sepsis  -appreciate heme eval       Narayan Harvey D.O.   Gastroenterology   [facility]                            [1] No Known Allergies

## 2025-04-05 NOTE — H&P
[facility]     Kj Condon Patient Status:  Emergency    10/15/1945 MRN [id number]   Location [facility] EMERGENCY DEPARTMENT Attending Joise Dawson MD   Hosp Day # 0 PCP Avinash You MD     Chief Complaint: Near syncope    History of Present Illness: Kj Condon is a 79 year old female with PMHx CAD, DM2, Ulcerative colitis, HLD, who presents for near syncope.     Patient with  at bedside.  Patient noted to have onset of suprapubic and left lower quadrant abdominal pain starting yesterday.  Pain kidney acute progressively worse and was having associated nausea and vomiting.  Patient also noted to be febrile at home to 100.2 with associated chills and rigors.  Denies associated chest pain, cough, congestion, shortness of breath.  Patient Entyvio.  Denies any associated hematochezia or melena, denies diarrhea.  On arrival to ER patient noted to have copious hematemesis and coffee-ground emesis on her gown.    Past Medical History:  Past Medical History:    Blood in the stool    CAD (coronary artery disease)    Dr. Montero     Calculus of kidney    Colitis    Constipation    Coronary atherosclerosis of native coronary artery    Diabetes (HCC)    Displacement of lumbar intervertebral disc without myelopathy    Dizziness    DVT (deep venous thrombosis) (HCC)    Frequent use of laxatives    Gall stones    Hemorrhoids    High cholesterol    Hip fracture (HCC)    History of blood transfusion    Irregular bowel habits    Kidney stones    Lumbar disc disease    Osteoporosis    Senile osteoporosis    Shortness of breath    Stented coronary artery    Stool incontinence    Stress fracture, left fibula, initial encounter for fracture    Weight loss        Past Surgical History:   Past Surgical History:   Procedure Laterality Date    Angioplasty (coronary)      Colectomy      Egd      Fracture surgery Right     ankle    Lithotripsy      Lmbr epidural steroid inj, physiatry  (internal)      Sigmoidoscopy,diagnostic         Social History:  reports that she has never smoked. She has never used smokeless tobacco. She reports that she does not drink alcohol and does not use drugs.    Family History:   Family History   Problem Relation Age of Onset    Heart Surgery Father         CABG    Diabetes Mother         Mellitus        Allergies: Allergies[1]    Medications:  Medications Ordered Prior to Encounter[2]    Review of Systems:   A comprehensive 14 point review of systems was completed.    Pertinent positives and negatives noted in the HPI.    Physical Exam:    /51   Pulse 89   Temp 99.8 °F (37.7 °C) (Oral)   Resp (!) 28   Ht 5' 3\" (1.6 m)   Wt 115 lb (52.2 kg)   SpO2 99%   BMI 20.37 kg/m²   General: No acute distress. Alert and oriented x 3.  HEENT: Normocephalic atraumatic. Moist mucous membranes. EOM-I. PERRLA. Anicteric.  Neck: No lymphadenopathy. No JVD. No carotid bruits.  Respiratory: Clear to auscultation bilaterally. No wheezes. No rhonchi.  Cardiovascular: S1, S2. Regular rate and rhythm. No murmurs, rubs or gallops. Equal pulses.   Chest and Back: No tenderness or deformity.  Abdomen: Soft, mild ttp in suprapubic region, nondistended.  Positive bowel sounds. No rebound, guarding or organomegaly. No CVA tenderness   Neurologic: No focal neurological deficits. CNII-XII grossly intact.  Musculoskeletal: Moves all extremities.  Extremities: No edema or cyanosis.  Integument: No rashes or lesions.   Psychiatric: Appropriate mood and affect.    Diagnostic Data:      Labs:  Recent Labs   Lab 04/05/25  1406   WBC 1.0*   HGB 11.4*   MCV 81.6   .0   INR 1.24*       Recent Labs   Lab 04/05/25  1406   *   BUN 22   CREATSERUM 1.86*   CA 10.0   ALB 4.3   *   K 4.2   CL 93*   CO2 22.0   ALKPHO 98   AST 31   ALT 18   BILT 2.4*   TP 7.1       Estimated Creatinine Clearance: 20.5 mL/min (A) (based on SCr of 1.86 mg/dL (H)).    Recent Labs   Lab 04/05/25  1406    PTP 15.7*   INR 1.24*       No results for input(s): \"TROP\", \"CK\" in the last 168 hours.    Imaging: Imaging data reviewed in [facility].  XR CHEST AP PORTABLE  (CPT=71045)    Result Date: 4/5/2025  CONCLUSION:  1. Hyperexpansion of the lungs. 2. Small left-sided pleural effusion with minimal left basilar atelectasis/infiltrates.    LOCATION:  [facility]      Dictated by (CST): Luis Armando Dash MD on 4/05/2025 at 3:31 PM     Finalized by (CST): Luis Armando Dash MD on 4/05/2025 at 3:31 PM          ASSESSMENT / PLAN:     # Septic shock 2/2 Neutropenic fever  # UTI    - IV abx continued, f/u Bcx    - s/p sepsis bolus in ER    - pressors per pulm/critical care    - CT A/P pending in ER    - UA positive for UTI, pending Ucx    - CXR without overt consolidations noted   - ID/Hematology consulted   - Trend CBC; defer c-GSF to hematology     # Hematemesis   # Ulcerative colitis    - On Entyvio as outpatient, holding    - NPO, IVF, PPI BID   - Hemoglobin Q12 hours, transfuse pRBC for Hgb < 7    - GI on consult    # Near syncope - likely 2/2 hypotension on presentation, CTM with IVF    # Hyponatremia - IVF, trend chem  # VIC, likely hypovolemic - IVF, trend chem   # Lactic acidosis - trend with IVF     # CAD - holding ASA; continue statin   # HTN - hold home BP meds while hypotensive   # DM2 - LDSSI; Last A1c value was 7.7% done 12/17/2024.  # Hyperlipidemia - continue home statin       Code Status: Prior    Plan of care discussed with patient, ED/critical care physician    Jeromy Marcelo MD  4/5/2025    Upon my evaluation, this patient had a high probability of imminent or life-threatening deterioration due to hypotension, which required my direct attention, intervention, and personal management.    I have personally provided 35 minutes of critical care time, exclusive of time spent on separately billable procedures.  Time includes review of all pertinent laboratory/radiology results, discussion with consultants, and  monitoring for potential decompensation.  Performed interventions included fluids, pressor drugs, and IV abx, discussion with ER and critical care physician .       [1] No Known Allergies  [2]   Current Facility-Administered Medications on File Prior to Encounter   Medication Dose Route Frequency Provider Last Rate Last Admin    [COMPLETED] vedolizumab (Entyvio) 300 mg in sodium chloride 0.9% 255 mL IVPB  300 mg Intravenous Once Reese aSnchez MD   Stopped at 25 1613    vedolizumab (Entyvio) 300 mg in sodium chloride 0.9% 254.8 mL IVPB  300 mg Intravenous Once Reese Sanchez MD         Current Outpatient Medications on File Prior to Encounter   Medication Sig Dispense Refill    [] Iron, Ferrous Sulfate, 325 (65 Fe) MG Oral Tab Take 1 tablet by mouth every other day. 30 tablet 0    ferrous sulfate 325 (65 FE) MG Oral Tab EC Take 1 tablet (325 mg total) by mouth 2 (two) times daily with meals. 60 tablet 0    aspirin (ASPIRIN EC LOW DOSE) 81 MG Oral Tab EC Take 1 tablet (81 mg total) by mouth daily. 90 tablet 3    [] Glucose Blood (FREESTYLE LITE TEST) In Vitro Strip Use as directed. 100 strip 0    Blood Glucose Monitoring Suppl (ONETOUCH VERIO REFLECT) w/Device Does not apply Kit Take 1 Bottle by mouth As Directed.      Lancets (ONETOUCH DELICA PLUS [id number]) Does not apply Misc 1 strip by In Vitro route daily.      zolpidem 10 MG Oral Tab Take 1 tablet (10 mg total) by mouth nightly as needed. (Patient not taking: Reported on 2024)      ketoconazole 2 % External Cream Apply 1 Application topically 2 (two) times daily.      triamcinolone 0.1 % External Cream Apply topically 2 (two) times daily.      glipiZIDE ER 5 MG Oral Tablet 24 Hr       pioglitazone 15 MG Oral Tab 1 tablet (15 mg total).      Glucose Blood (FREESTYLE LITE TEST) In Vitro Strip Tests 1 x daily 100 strip 2    Pantoprazole Sodium 40 MG Oral Tab EC Take 1 tablet (40 mg total) by mouth daily. Before meal (Patient taking  differently: Take 1 tablet (40 mg total) by mouth every morning before breakfast. Before meal) 60 tablet 0    Multiple Vitamins-Minerals (TAB-A-SHLOMO) Oral Tab Take 1 tablet by mouth daily.      atorvastatin 40 MG Oral Tab Take 0.5 tablets (20 mg total) by mouth nightly.  6    atenolol 50 MG Oral Tab Take 0.5 tablets (25 mg total) by mouth daily.  1    Calcium Carb-Cholecalciferol (CALCIUM + D3) 600-200 MG-UNIT Oral Tab Take 1 tablet by mouth daily.

## 2025-04-06 LAB
ALBUMIN SERPL-MCNC: 3.2 G/DL (ref 3.2–4.8)
ALBUMIN/GLOB SERPL: 1.5 {RATIO} (ref 1–2)
ALP LIVER SERPL-CCNC: 54 U/L
ALT SERPL-CCNC: 15 U/L
ANION GAP SERPL CALC-SCNC: 8 MMOL/L (ref 0–18)
ANION GAP SERPL CALC-SCNC: 8 MMOL/L (ref 0–18)
AST SERPL-CCNC: 34 U/L (ref ?–34)
ATRIAL RATE: 92 BPM
BASOPHILS # BLD AUTO: 0.04 X10(3) UL (ref 0–0.2)
BASOPHILS NFR BLD AUTO: 0.3 %
BILIRUB SERPL-MCNC: 1.7 MG/DL (ref 0.2–1.1)
BUN BLD-MCNC: 17 MG/DL (ref 9–23)
BUN BLD-MCNC: 17 MG/DL (ref 9–23)
C DIFF TOX B STL QL: NEGATIVE
CALCIUM BLD-MCNC: 8.7 MG/DL (ref 8.7–10.6)
CALCIUM BLD-MCNC: 8.7 MG/DL (ref 8.7–10.6)
CHLORIDE SERPL-SCNC: 106 MMOL/L (ref 98–112)
CHLORIDE SERPL-SCNC: 106 MMOL/L (ref 98–112)
CO2 SERPL-SCNC: 24 MMOL/L (ref 21–32)
CO2 SERPL-SCNC: 24 MMOL/L (ref 21–32)
CREAT BLD-MCNC: 1.77 MG/DL
CREAT BLD-MCNC: 1.77 MG/DL
EGFRCR SERPLBLD CKD-EPI 2021: 29 ML/MIN/1.73M2 (ref 60–?)
EGFRCR SERPLBLD CKD-EPI 2021: 29 ML/MIN/1.73M2 (ref 60–?)
EOSINOPHIL # BLD AUTO: 0.07 X10(3) UL (ref 0–0.7)
EOSINOPHIL NFR BLD AUTO: 0.5 %
ERYTHROCYTE [DISTWIDTH] IN BLOOD BY AUTOMATED COUNT: 14.9 %
EST. AVERAGE GLUCOSE BLD GHB EST-MCNC: 143 MG/DL (ref 68–126)
GLOBULIN PLAS-MCNC: 2.2 G/DL (ref 2–3.5)
GLUCOSE BLD-MCNC: 135 MG/DL (ref 70–99)
GLUCOSE BLD-MCNC: 149 MG/DL (ref 70–99)
GLUCOSE BLD-MCNC: 149 MG/DL (ref 70–99)
GLUCOSE BLD-MCNC: 249 MG/DL (ref 70–99)
HBA1C MFR BLD: 6.6 % (ref ?–5.7)
HCT VFR BLD AUTO: 27.6 %
HGB BLD-MCNC: 9.1 G/DL
IMM GRANULOCYTES # BLD AUTO: 0.42 X10(3) UL (ref 0–1)
IMM GRANULOCYTES NFR BLD: 3.3 %
INR BLD: 1.6 (ref 0.8–1.2)
LYMPHOCYTES # BLD AUTO: 0.66 X10(3) UL (ref 1–4)
LYMPHOCYTES NFR BLD AUTO: 5.1 %
MAGNESIUM SERPL-MCNC: 1.8 MG/DL (ref 1.6–2.6)
MCH RBC QN AUTO: 26.8 PG (ref 26–34)
MCHC RBC AUTO-ENTMCNC: 33 G/DL (ref 31–37)
MCV RBC AUTO: 81.4 FL
MONOCYTES # BLD AUTO: 0.38 X10(3) UL (ref 0.1–1)
MONOCYTES NFR BLD AUTO: 3 %
NEUTROPHILS # BLD AUTO: 11.31 X10 (3) UL (ref 1.5–7.7)
NEUTROPHILS # BLD AUTO: 11.31 X10(3) UL (ref 1.5–7.7)
NEUTROPHILS NFR BLD AUTO: 87.8 %
OSMOLALITY SERPL CALC.SUM OF ELEC: 290 MOSM/KG (ref 275–295)
OSMOLALITY SERPL CALC.SUM OF ELEC: 290 MOSM/KG (ref 275–295)
P AXIS: 59 DEGREES
P-R INTERVAL: 150 MS
PHOSPHATE SERPL-MCNC: 3.2 MG/DL (ref 2.4–5.1)
PLATELET # BLD AUTO: 155 10(3)UL (ref 150–450)
POTASSIUM SERPL-SCNC: 4.2 MMOL/L (ref 3.5–5.1)
POTASSIUM SERPL-SCNC: 4.2 MMOL/L (ref 3.5–5.1)
PROT SERPL-MCNC: 5.4 G/DL (ref 5.7–8.2)
PROTHROMBIN TIME: 19.2 SECONDS (ref 11.6–14.8)
Q-T INTERVAL: 362 MS
QRS DURATION: 66 MS
QTC CALCULATION (BEZET): 447 MS
R AXIS: 9 DEGREES
RBC # BLD AUTO: 3.39 X10(6)UL
SODIUM SERPL-SCNC: 138 MMOL/L (ref 136–145)
SODIUM SERPL-SCNC: 138 MMOL/L (ref 136–145)
T AXIS: 75 DEGREES
VENTRICULAR RATE: 92 BPM
WBC # BLD AUTO: 12.9 X10(3) UL (ref 4–11)

## 2025-04-06 PROCEDURE — 99232 SBSQ HOSP IP/OBS MODERATE 35: CPT | Performed by: INTERNAL MEDICINE

## 2025-04-06 PROCEDURE — 99233 SBSQ HOSP IP/OBS HIGH 50: CPT | Performed by: INTERNAL MEDICINE

## 2025-04-06 RX ORDER — MAGNESIUM OXIDE 400 MG/1
400 TABLET ORAL ONCE
Status: COMPLETED | OUTPATIENT
Start: 2025-04-06 | End: 2025-04-06

## 2025-04-06 RX ORDER — TRIAMCINOLONE ACETONIDE 1 MG/G
CREAM TOPICAL 2 TIMES DAILY
Status: DISCONTINUED | OUTPATIENT
Start: 2025-04-06 | End: 2025-04-09

## 2025-04-06 RX ORDER — ASPIRIN 81 MG/1
81 TABLET ORAL DAILY
Status: DISCONTINUED | OUTPATIENT
Start: 2025-04-06 | End: 2025-04-09

## 2025-04-06 RX ORDER — PANTOPRAZOLE SODIUM 40 MG/1
40 TABLET, DELAYED RELEASE ORAL
Qty: 60 TABLET | Refills: 1 | Status: SHIPPED | OUTPATIENT
Start: 2025-04-06

## 2025-04-06 RX ORDER — PANTOPRAZOLE SODIUM 40 MG/1
40 TABLET, DELAYED RELEASE ORAL
Status: DISCONTINUED | OUTPATIENT
Start: 2025-04-06 | End: 2025-04-09

## 2025-04-06 RX ORDER — HYDRALAZINE HYDROCHLORIDE 20 MG/ML
10 INJECTION INTRAMUSCULAR; INTRAVENOUS EVERY 4 HOURS PRN
Status: DISCONTINUED | OUTPATIENT
Start: 2025-04-06 | End: 2025-04-09

## 2025-04-06 NOTE — PROGRESS NOTES
[facility] Pharmacy Dosing Service      Initial Pharmacokinetic Consult for Vancomycin Dosing     Kj Condon is a 79 year old female who is being initiated on vancomycin therapy for  Septic Shock 2/2 presumed gram negative sepsis, UTI positive, possible intraabdominal source as well .  Pharmacy has been asked to dose vancomycin by Dr. Baeza.  The initial treatment and monitoring approach will be non-AUC strategy.        Weight and Temperature:    Wt Readings from Last 1 Encounters:   25 52.2 kg (115 lb)        Temp Readings from Last 1 Encounters:   25 100.4 °F (38 °C) (Temporal)      Labs:   Recent Labs   Lab 25  1406   CREATSERUM 1.86*      Estimated Creatinine Clearance: 20.5 mL/min (A) (based on SCr of 1.86 mg/dL (H)).     Recent Labs   Lab 25  1406 25  1648 25  1745   WBC 1.0* 7.1 8.9          The Pharmacokinetic Target is:    Trough/random 10-15 mg/L    Renal Dosing Considerations:    VIC/ARF     Assessment/Plan:   Initial/Loading dose: Will receive 1250 mg IV (25 mg/kg, capped at 2250 mg) x 1 loading dose.      Maintenance dose: Pharmacy will dose vancomycin at 750 mg IV every 36 hours    Monitorin) Plan for vancomycin trough to be obtained at steady state    2) Pharmacy will order SCr as clinically indicated to assess renal function.    3) Pharmacy will monitor for toxicity and efficacy, adjust vancomycin dose and/or frequency, and order vancomycin levels as appropriate per the Pharmacy and Therapeutics Committee approved protocol until discontinuation of the medication.       We appreciate the opportunity to assist in the care of this patient.     Racheal Biswas, PharmD  2025  9:01 PM  [facility]  Pharmacy Extension: [phone]

## 2025-04-06 NOTE — ANESTHESIA POSTPROCEDURE EVALUATION
[facility] Patient Status:  Inpatient   Age/Gender 79 year old female MRN [id number]   Location [facility] 4SW-A Attending Galileo Baeza*   Hosp Day # 0 PCP Avinash You MD       Anesthesia Post-op Note    CYSTOSCOPY, LEFT RETROGRADE PYELOGRAM, LEFT URETERAL STENT INSERTION    Procedure Summary       Date: 04/05/25 Room / Location:  MAIN OR 07 / [facility] MAIN OR    Anesthesia Start: 2001 Anesthesia Stop: 2042    Procedure: CYSTOSCOPY, LEFT RETROGRADE PYELOGRAM, LEFT URETERAL STENT INSERTION (Left) Diagnosis:       Sepsis (HCC)      (Sepsis (HCC) [A41.9])    Surgeons: Galileo Baeza MD Anesthesiologist: Abilio Snyder MD    Anesthesia Type: MAC ASA Status: 3            Anesthesia Type: MAC    Vitals Value Taken Time   /45 04/05/25 2048   Temp 100.4 °F (38 °C) 04/05/25 2048   Pulse 84 04/05/25 2059   Resp 30 04/05/25 2059   SpO2 99 % 04/05/25 2059   Vitals shown include unfiled device data.        Patient Location: ICU    Anesthesia Type: MAC    Airway Patency: patent    Postop Pain Control: adequate    Mental Status: preanesthetic baseline    Nausea/Vomiting: none    Cardiopulmonary/Hydration status: stable euvolemic    Complications: no apparent anesthesia related complications    Postop vital signs: stable    Dental Exam: Unchanged from Preop    Sign out given to ICU staff.

## 2025-04-06 NOTE — PLAN OF CARE
Problem: CARDIOVASCULAR - ADULT  Goal: Maintains optimal cardiac output and hemodynamic stability  Description: INTERVENTIONS:- Monitor vital signs, rhythm, and trends- Monitor for bleeding, hypotension and signs of decreased cardiac output- Evaluate effectiveness of vasoactive medications to optimize hemodynamic stability- Monitor arterial and/or venous puncture sites for bleeding and/or hematoma- Assess quality of pulses, skin color and temperature- Assess for signs of decreased coronary artery perfusion - ex. Angina- Evaluate fluid balance, assess for edema, trend weights  Outcome: Progressing  Goal: Absence of cardiac arrhythmias or at baseline  Description: INTERVENTIONS:- Continuous cardiac monitoring, monitor vital signs, obtain 12 lead EKG if indicated- Evaluate effectiveness of antiarrhythmic and heart rate control medications as ordered- Initiate emergency measures for life threatening arrhythmias- Monitor electrolytes and administer replacement therapy as ordered  Outcome: Progressing     Problem: GASTROINTESTINAL - ADULT  Goal: Minimal or absence of nausea and vomiting  Description: INTERVENTIONS:- Maintain adequate hydration with IV or PO as ordered and tolerated- Nasogastric tube to low intermittent suction as ordered- Evaluate effectiveness of ordered antiemetic medications- Provide nonpharmacologic comfort measures as appropriate- Advance diet as tolerated, if ordered- Obtain nutritional consult as needed- Evaluate fluid balance  Outcome: Progressing     Problem: GENITOURINARY - ADULT  Goal: Absence of urinary retention  Description: INTERVENTIONS:- Assess patient’s ability to void and empty bladder- Monitor intake/output and perform bladder scan as needed- Follow urinary retention protocol/standard of care- Consider collaborating with pharmacy to review patient's medication profile- Implement strategies to promote bladder emptying  Outcome: Progressing   Received patient this a.m. off levophed.  Denies pain. On room air. Nava discontinue.  Will continue to monitor patient. Ok to transfer.

## 2025-04-06 NOTE — PROGRESS NOTES
Gastroenterology Follow-Up Note      Kj Condon Patient Status:  Inpatient    10/15/1945 MRN [id number]   Former[facility] 4SW-A Attending Galileo Baeza*   Hosp Day # 1 PCP Avinash You MD     Chief Complaint/Reason for Follow Up: 79 year-old female being seen in follow up for coffee ground emesis and history of Crohn's disease    HPI/Subjective:      Patient underwent left ureteral stent placement yesterday for obstructing left ureteral stone seen on CT. No acute complaints. Today. No melena or hematochezia. No hematemesis.     PMed/Soc/Family:  No change since initial consult note     ROS:  Constitutional: Negative unless specified in HPI  GI: See HPI    Objective:  Blood pressure 129/50, pulse 79, temperature 99.4 °F (37.4 °C), temperature source Temporal, resp. rate 25, height 5' 3\" (1.6 m), weight 119 lb 0.8 oz (54 kg), SpO2 99%, not currently breastfeeding.    Constitutional: Normal general appearance  Eyes: Normal conjunctiva   Ears and nose: Normal appearing ears and nose  Respiratory: Normal respiratory effort   Cardiovascular: Normal carotid pulses amplitude. No lower extremity edema  Gastrointestinal: Soft, mild tenderness, non-guarded, normal bowel sounds. No mass palpated. Normal liver edge and spleen.   Skin: No jaundice.   Psychiatric: alert and oriented to person, place and time. Normal insight and judgment.     Data:  Relevant labs, imaging and medications reviewed.     Lab Results   Component Value Date    WBC 12.9 2025    HGB 9.1 2025    HCT 27.6 2025    .0 2025    CREATSERUM 1.77 2025    CREATSERUM 1.77 2025    BUN 17 2025    BUN 17 2025     2025     2025    K 4.2 2025    K 4.2 2025     2025     2025    CO2 24.0 2025    CO2 24.0 2025     2025     2025    CA 8.7 2025    CA 8.7 2025    ALB 3.2 2025     ALKPHO 54 04/06/2025    BILT 1.7 04/06/2025    TP 5.4 04/06/2025    AST 34 04/06/2025    ALT 15 04/06/2025    PTT 33.0 04/05/2025    INR 1.60 04/06/2025    PTP 19.2 04/06/2025    LIP 48 04/05/2025    MG 1.8 04/06/2025    PHOS 3.2 04/06/2025    B12 286 04/05/2025    PGLU 148 04/05/2025           Assessment/Plan:    Coffee Ground Emesis  -suspect related to nausea/vomiting in setting of UTI  -Hgb relatively stable, no melena no further emesis  -no role for EGD at this time   -BID PPI x 8 weeks  -will schedule outpatient follow up    History of pouchitis/Crohn's w/ anal stricture  -no obstructive process on CT  -hold Entyvio until acute infection resolved  -outpatient follow up      UTI  -abx, supportive care   -appreciate icu/uro   -s/p left ureteral stent placement  -outpatient uro f/u     Will sign off. Please call with any questions.        MARLA Harvey  Gastroenterology/Advanced Endoscopy  [facility]

## 2025-04-06 NOTE — ANESTHESIA PREPROCEDURE EVALUATION
PRE-OP EVALUATION    Patient Name: Kj Condon    Admit Diagnosis: No admission diagnoses are documented for this encounter.    Pre-op Diagnosis: Sepsis (HCC) [A41.9]    CYSTOSCOPY, LEFT RETROGRADE PYELOGRAM, LEFT URETERAL STENT INSERTION    Anesthesia Procedure: CYSTOSCOPY, LEFT RETROGRADE PYELOGRAM, LEFT URETERAL STENT INSERTION (Left)    Surgeons and Role:     * Galileo Baeza MD - Primary    Pre-op vitals reviewed.  Temp: 99.8 °F (37.7 °C)  Pulse: 91  Resp: 23  BP: 113/70  SpO2: 100 %  Body mass index is 20.37 kg/m².    Current medications reviewed.  Hospital Medications:   [COMPLETED] sodium chloride 0.9 % IV bolus 1,000 mL  1,000 mL Intravenous Once    [COMPLETED] pantoprazole (Protonix) 40 mg in sodium chloride 0.9% PF 10 mL IV push  40 mg Intravenous Once    [COMPLETED] sodium chloride 0.9 % IV bolus 1,566 mL  30 mL/kg Intravenous Once    [COMPLETED] piperacillin-tazobactam (Zosyn) 4.5 g in dextrose 5% 100 mL IVPB-ADDV  4.5 g Intravenous Once    norepinephrine (Levophed) 4 mg/250mL infusion premix  0.5-50 mcg/min Intravenous Continuous    [COMPLETED] iopamidol 76% (ISOVUE-370) injection for power injector  80 mL Intravenous ONCE PRN    vedolizumab (Entyvio) 300 mg in sodium chloride 0.9% 254.8 mL IVPB  300 mg Intravenous Once       Outpatient Medications:   Prescriptions Prior to Admission[1]    Allergies: Patient has no known allergies.      Anesthesia Evaluation    Patient summary reviewed.    Anesthetic Complications  (-) history of anesthetic complications         GI/Hepatic/Renal                                 Cardiovascular        Exercise tolerance: good     MET: >4      (+) hypertension     (+) CAD  (-) past MI  (-) CABG/stent                            Endo/Other      (+) diabetes   not using insulin  (-) hypothyroidism  (-) hyperthyroidism                     Pulmonary               (-) shortness of breath            Neuro/Psych          (-) CVA   (-) TIA  (-) seizures                        Past Surgical History:   Procedure Laterality Date    Angioplasty (coronary)      Colectomy      Egd      Fracture surgery Right     ankle    Lithotripsy      Lmbr epidural steroid inj, physiatry (internal)      Sigmoidoscopy,diagnostic       Social History     Socioeconomic History    Marital status:    Tobacco Use    Smoking status: Never    Smokeless tobacco: Never   Vaping Use    Vaping status: Never Used   Substance and Sexual Activity    Alcohol use: No     Alcohol/week: 0.0 standard drinks of alcohol    Drug use: No    Sexual activity: Never   Other Topics Concern    Caffeine Concern Yes     Comment: 1 cup tea once per day     Exercise Yes     Comment: x7 days per week      History   Drug Use No     Available pre-op labs reviewed.  Lab Results   Component Value Date    WBC 8.9 04/05/2025    RBC 3.11 (L) 04/05/2025    HGB 8.4 (L) 04/05/2025    HCT 25.3 (L) 04/05/2025    MCV 81.4 04/05/2025    MCH 27.0 04/05/2025    MCHC 33.2 04/05/2025    RDW 14.9 04/05/2025    .0 (L) 04/05/2025     Lab Results   Component Value Date     (L) 04/05/2025    K 4.2 04/05/2025    CL 93 (L) 04/05/2025    CO2 22.0 04/05/2025    BUN 22 04/05/2025    CREATSERUM 1.86 (H) 04/05/2025     (H) 04/05/2025    CA 10.0 04/05/2025     Lab Results   Component Value Date    INR 1.24 (H) 04/05/2025         Airway      Mallampati: III  Mouth opening: >3 FB  TM distance: > 6 cm  Neck ROM: full Cardiovascular    Cardiovascular exam normal.         Dental    Dentition appears grossly intact         Pulmonary    Pulmonary exam normal.                 Other findings              ASA: 3 and emergent  Plan: MAC  NPO status verified and           Plan/risks discussed with: patient and child/children                Present on Admission:  **None**             [1] (Not in a hospital admission)     Opt out

## 2025-04-06 NOTE — CONSULTS
[facility] CONSULT NOTE     Kj Condon Patient Status:  Emergency    10/15/1945 MRN [id number]   Location [facility] EMERGENCY DEPARTMENT Attending Josie Dawson MD   Hosp Day # 0 PCP Avinash You MD     Reason for Consultation:  SIRS and septic  stone     History of Present Illness:  Kj Condon is a a(n) 79 year old female.     Multiple medical problems including CAD and IBD  on meds   Now  with dirty urine and Ct  w  hydro and   5 mm ureteral stone       History:  Past Medical History:    Blood in the stool    CAD (coronary artery disease)    Dr. Montero     Calculus of kidney    Colitis    Constipation    Coronary atherosclerosis of native coronary artery    Diabetes (HCC)    Displacement of lumbar intervertebral disc without myelopathy    Dizziness    DVT (deep venous thrombosis) (HCC)    Frequent use of laxatives    Gall stones    Hemorrhoids    High cholesterol    Hip fracture (HCC)    History of blood transfusion    Irregular bowel habits    Kidney stones    Lumbar disc disease    Osteoporosis    Senile osteoporosis    Shortness of breath    Stented coronary artery    Stool incontinence    Stress fracture, left fibula, initial encounter for fracture    Weight loss     Past Surgical History:   Procedure Laterality Date    Angioplasty (coronary)      Colectomy      Egd      Fracture surgery Right     ankle    Lithotripsy      Lmbr epidural steroid inj, physiatry (internal)      Sigmoidoscopy,diagnostic       Family History   Problem Relation Age of Onset    Heart Surgery Father         CABG    Diabetes Mother         Mellitus       reports that she has never smoked. She has never used smokeless tobacco. She reports that she does not drink alcohol and does not use drugs.    Allergies:  Allergies[1]    Medications:    Current Facility-Administered Medications:     norepinephrine (Levophed) 4 mg/250mL infusion premix, 0.5-50 mcg/min, Intravenous, Continuous    vedolizumab (Entyvio) 300 mg in  sodium chloride 0.9% 254.8 mL IVPB, 300 mg, Intravenous, Once    Review of Systems:  Pertinent items are noted in HPI.    Physical Exam:  /70   Pulse 91   Temp 99.8 °F (37.7 °C) (Oral)   Resp 23   Ht 5' 3\" (1.6 m)   Wt 115 lb (52.2 kg)   SpO2 100%   BMI 20.37 kg/m²   GENERAL: well developed, well nourished, no apparent distress  EYES: sclera non-icteric, no redness   MOUTH:  moist oral mucosa, no lesions  LUNGS: normal respiratory motion without distress  GI: Abdomen soft without organomegally, no tenderness, normal bowel sounds, ++ n CVAT       NEURO: Alert and Oriented, motor and sensory grossly non-focal   LYMPH:  No appreciable axillary, neck, or inguinal  Adenopathy  SKIN: No rashes, no discoloration  EXTREMITIES: No edema or cyanosis, no calf pain, no appreciable joint deformities     Laboratory Data:  Lab Results   Component Value Date    WBC 8.9 04/05/2025    HGB 8.4 04/05/2025    HCT 25.3 04/05/2025    .0 04/05/2025    CREATSERUM 1.86 04/05/2025    BUN 22 04/05/2025     04/05/2025    K 4.2 04/05/2025    CL 93 04/05/2025    CO2 22.0 04/05/2025     04/05/2025    CA 10.0 04/05/2025    ALB 4.3 04/05/2025    ALKPHO 98 04/05/2025    BILT 2.4 04/05/2025    TP 7.1 04/05/2025    AST 31 04/05/2025    ALT 18 04/05/2025    PTT 33.0 04/05/2025    INR 1.24 04/05/2025    PTP 15.7 04/05/2025    LIP 48 04/05/2025    B12 286 04/05/2025    PGLU 134 04/05/2025     Lab Results   Component Value Date    COLORUR Yellow 04/05/2025    CLARITY Turbid 04/05/2025    SPECGRAVITY 1.018 04/05/2025    GLUUR Normal 04/05/2025    BILUR Negative 04/05/2025    KETUR Trace 04/05/2025    BLOODURINE 1+ 04/05/2025    PHURINE 5.5 04/05/2025    PROUR 50 04/05/2025    UROBILINOGEN Normal 04/05/2025    NITRITE Negative 04/05/2025    LEUUR 500 04/05/2025     No results found for: \"PSA\"      Intake/Output Summary (Last 24 hours) at 4/5/2025 2001  Last data filed at 4/5/2025 1900  Gross per 24 hour   Intake 1566 ml    Output 200 ml   Net 1366 ml         Imaging:    CT personal review      RLP branching stone 1.8 cm  LEFT 1 cm UP and 1 cm LP and 5 mm distal ureter   Hydro ureter   Impression and Plan:  Patient Active Problem List   Diagnosis    Senile osteoporosis    Coronary atherosclerosis of native coronary artery    Ulcerative (chronic) enterocolitis (HCC)    Hyperlipidemia    Trigger ring finger of right hand    Stented coronary artery    Type 2 diabetes mellitus without complication, without long-term current use of insulin (HCC)    Pouchitis (HCC)    Pruritus    Iron deficiency anemia due to chronic blood loss    Gallstones    Renal stones    Protein-calorie malnutrition, unspecified severity (HCC)    Other osteoporosis without current pathological fracture    Syncope    Gastritis    Essential hypertension    Neutropenic fever    Septic shock (HCC)    Acute cystitis without hematuria    Hematemesis with nausea    Ulcerative colitis with complication (HCC)        1. RLP branching stone 1.8 cm  2.  LEFT 1 cm UP and 1 cm LP and 5 mm distal ureter   3, SIRS  4. Dirty urine       Will place a stent and do a very gentle retrograde   Unwise to do URS   Treat the infection and then will come back in 2-3 weeks with a negative CX for definite stone surgery     FU as out pt in 2-3 weeks w  Dr. Queen or Dr. Almendarez       All risks, benefits and alternatives to surgery were discusssed in detail with the patient. Complications such as urosepsis, bleeding, infection, hematuria, urinary retention, clot retention, and the rare risk of urethral, bladder and/ or ureteral injury were detailed to the patient. The patient was given ample time to ask questions. All questions were answered.  After weighing the risks, benefits and alternatives, patient elects to proceed with surgery as planned.           Thank you for allowing me to participate in the care of your patient.    Please give me a call on my cell if you have any questions of  concerns.     Barak Baeza MD   [facility]  Department of Urology  Cell: [phone]  Office :[phone]        4/5/2025  8:01 PM        [1] No Known Allergies

## 2025-04-06 NOTE — PROGRESS NOTES
[facility] PROGRESS NOTE     Kj Condon Patient Status:  Inpatient    10/15/1945 MRN [id number]   [facility] 4SW-A Attending Galileo Baeza*   Hosp Day # 1 PCP Avinash You MD     Subjective:  Kj Condon is a(n) 79 year old female, POD  1  -- JJ stent for SIRS     Hospital course to date: Doing much better and off pressors         Objective:  Blood pressure 101/56, pulse 71, temperature 98.1 °F (36.7 °C), temperature source Temporal, resp. rate 23, height 5' 3\" (1.6 m), weight 119 lb 0.8 oz (54 kg), SpO2 98%, not currently breastfeeding.  General appearance: alert, appears stated age and cooperative  Eyes: sclera non-icteric, no redness   Mouth:  moist oral mucosa, no lesions  Lungs: Unlabored respirations  Abdomen: Soft, non-tender, not distended  NO CVAT   Extremities: no edema, no calf pain, no skin discoloration      Lab Results   Component Value Date    WBC 12.9 2025    HGB 9.1 2025    HCT 27.6 2025    .0 2025    CREATSERUM 1.77 2025    CREATSERUM 1.77 2025    BUN 17 2025    BUN 17 2025     2025     2025    K 4.2 2025    K 4.2 2025     2025     2025    CO2 24.0 2025    CO2 24.0 2025     2025     2025    CA 8.7 2025    CA 8.7 2025    ALB 3.2 2025    ALKPHO 54 2025    BILT 1.7 2025    TP 5.4 2025    AST 34 2025    ALT 15 2025    PTT 33.0 2025    INR 1.60 2025    PTP 19.2 2025    LIP 48 2025    MG 1.8 2025    PHOS 3.2 2025    B12 286 2025    PGLU 249 2025     Lab Results   Component Value Date    COLORUR Yellow 2025    CLARITY Turbid 2025    SPECGRAVITY 1.018 2025    GLUUR Normal 2025    BILUR Negative 2025    KETUR Trace 2025    BLOODURINE 1+ 2025    PHURINE 5.5 2025    PROUR 50  04/05/2025    UROBILINOGEN Normal 04/05/2025    NITRITE Negative 04/05/2025    LEUUR 500 04/05/2025     No results found for: \"PSA\"      Intake/Output Summary (Last 24 hours) at 4/6/2025 1247  Last data filed at 4/6/2025 1200  Gross per 24 hour   Intake 4276 ml   Output 2450 ml   Net 1826 ml       Images:     Assessment and Plan:       SIRS   L obstructing ureteral stone  POD 1 JJ stent   UTI     OK to remove Nava   Ditropan PRN for bladder spasms   FU in 2 weeks as outpt  with Urology - after infection treated   Once she has a repeat negative CX as outpt - can get stone surgery in 2-3 weeks   Hold on Flomax and AMP just 60     Signing off.   Please re consult us again if there is a pressing Urologic issue.               Please give me a call on my cell if you have any questions of concerns.     Barak Baeza MD   [facility]  Department of Urology  Cell: [phone]  Office :[phone]    12:47 PM

## 2025-04-06 NOTE — PLAN OF CARE
Report received from Anesthesia and ER nurse.  Pt A&0 x 4. Pt on RA and off levo in the OR,. Pt c/o slight   headache medicated as per orders.  Low grade fever . See flowsheet for further assessment  Updates given to son.  2300 restarted Levo goal MAP > 65  LR bolus.

## 2025-04-06 NOTE — PROGRESS NOTES
[facility]   part of [facility]     Hospitalist Progress Note     Kj Condon Patient Status:  Inpatient    10/15/1945 MRN [id number]   Location [facility] 4SW-A Attending Galileo Baeza*   Hosp Day # 1 PCP Avinash Yuo MD     Chief Complaint: near syncope    Subjective:     Patient reports some upper sternal pain with palpation. Reports stool incontinence which is new. She has 5-6 Bms daily but is usually continent. Denies fevers, chills.     Objective:    Review of Systems:   A comprehensive review of systems was completed; pertinent positive and negatives stated in subjective.    Vital signs:  Temp:  [98.1 °F (36.7 °C)-100.4 °F (38 °C)] 98.1 °F (36.7 °C)  Pulse:  [64-96] 71  Resp:  [15-36] 27  BP: ()/(36-91) 113/50  SpO2:  [90 %-100 %] 99 %    Physical Exam:    General: No acute distress  Respiratory: No wheezes, no rhonchi  Cardiovascular: S1, S2, regular rate and rhythm  Abdomen: Soft, Non-tender, non-distended, positive bowel sounds  Neuro: No new focal deficits.   Extremities: No edema    Diagnostic Data:    Labs:  Recent Labs   Lab 25  1406 25  1648 25  1745 25  2120 25  0451   WBC 1.0* 7.1 8.9 13.3* 12.9*   HGB 11.4* 9.4* 8.4* 9.6* 9.1*   MCV 81.6 79.8* 81.4 81.1 81.4   .0 142.0* 140.0* 146.0* 155.0   INR 1.24*  --   --   --  1.60*       Recent Labs   Lab 25  1406 25  0451   * 149*  149*   BUN 22 17  17   CREATSERUM 1.86* 1.77*  1.77*   CA 10.0 8.7  8.7   ALB 4.3 3.2   * 138  138   K 4.2 4.2  4.2   CL 93* 106  106   CO2 22.0 24.0  24.0   ALKPHO 98 54*   AST 31 34*   ALT 18 15   BILT 2.4* 1.7*   TP 7.1 5.4*       Estimated Glomerular Filtration Rate: 29 mL/min/1.73m2 (A) (result from lab).    No results for input(s): \"TROP\", \"TROPHS\", \"CK\" in the last 168 hours.    Recent Labs   Lab 25  1406 25  0451   PTP 15.7* 19.2*   INR 1.24* 1.60*                  Microbiology    No results found for  this visit on 04/05/25.      Imaging: Reviewed in Epic.    Medications:    pantoprazole  40 mg Oral BID AC    aspirin  81 mg Oral Daily    atorvastatin  20 mg Oral Nightly    insulin aspart  1-10 Units Subcutaneous TID AC and HS    sennosides  17.2 mg Oral Nightly    piperacillin-tazobactam  4.5 g Intravenous Q8H    [START ON 4/7/2025] vancomycin  750 mg Intravenous Q36H    enoxaparin  30 mg Subcutaneous Daily       Assessment & Plan:      #Septic shock 2/2 Neutropenic fever  #Complicated UTI with obstructing stone  -pressors per pulm/critical care   -ID/Hematology consulted  -IV zosyn, vancomycin   -Follow cultures     #Hematemesis   #Ulcerative colitis   Patient is on Entyvio as outpatient.GI evaluated and likely 2/2 n/v. Hgb stable so GI signed off.     #Costochondritis - lidocaine patch, prn tylenol     #Left obstructing stone with mild upstream hydroureteronephrosis s/p cystoscopy and stent placement 4/5     #Near syncope - likely 2/2 hypotension on presentation, CTM with IVF     #Hyponatremia - IVF, trend chem  #VIC/CKD, likely hypovolemic - IVF, trend chem   # CAD - holding ASA; continue statin   # HTN - hold home BP meds while hypotensive   # DM2 - LDSSI; Last A1c value was 7.7% done 12/17/2024.  # Hyperlipidemia - continue home statin     Mady Amador MD    Supplementary Documentation:     Quality:  DVT Mechanical Prophylaxis:   SCDs, Early ambuation  DVT Pharmacologic Prophylaxis   Medication    enoxaparin (Lovenox) 30 MG/0.3ML SUBQ injection 30 mg         DVT Pharmacologic prophylaxis: Aspirin 81 mg      Code Status: Full Code  Nava: No urinary catheter in place  Nava Duration (in days):   Central line:    MARINO:     Discharge is dependent on: course  At this point Ms. Condon is expected to be discharge to: home pending improvement of sepsis     The 21st Century Cures Act makes medical notes like these available to patients in the interest of transparency. Please be advised this is a medical document.  Medical documents are intended to carry relevant information, facts as evident, and the clinical opinion of the practitioner. The medical note is intended as peer to peer communication and may appear blunt or direct. It is written in medical language and may contain abbreviations or verbiage that are unfamiliar.              **Certification      PHYSICIAN Certification of Need for Inpatient Hospitalization - Initial Certification    Patient will require inpatient services that will reasonably be expected to span two midnight's based on the clinical documentation in H+P.   Based on patients current state of illness, I anticipate that, after discharge, patient will require TBD.

## 2025-04-06 NOTE — PROGRESS NOTES
[facility] Critical Care Progress Note    Kj Condon Patient Status:  Inpatient    10/15/1945 MRN [id number]   [facility] 4SW-A Attending Galileo Baeza*   Hosp Day # 1 PCP Avinash You MD     Subjective:  Kj Condon is a(n) 79 year old female who is admitted on 2025 for Kidney stone [N20.0]  Hyponatremia [E87.1]  Neutropenic fever [D70.9, R50.81]  Calculus of gallbladder without cholecystitis without obstruction [K80.20]  Hematemesis with nausea [K92.0]  Crohn's disease with complication, unspecified gastrointestinal tract location (HCC) [K50.919]  Acute renal failure superimposed on chronic kidney disease, unspecified acute renal failure type, unspecified CKD stage [N17.9, N18.9]      Overnight: No acute events overnight CT with stone, underwent stent placement, feels much better, off levo this morning    OBJECTIVE  Vitals:  /50   Pulse 79   Temp 99.4 °F (37.4 °C) (Temporal)   Resp 25   Ht 5' 3\" (1.6 m)   Wt 119 lb 0.8 oz (54 kg)   SpO2 99%   BMI 21.09 kg/m²           Physical Exam:    General Appearance: alert, well appearing, and in no distress  Lungs: clear to auscultation bilaterally  Heart: regular rate and rhythm, S1, S2 normal, no murmur, click, rub or gallop  Abdomen: soft, non-tender, without masses or organomegaly  Extremities: Atraumatic, no cyanosis or edema, capillary refill <3 sec.    Pulses: 2+ and symmetric all extremities  Skin: Skin color, texture, turgor normal for ethnicity, no rashes or lesions, warm and dry  Neurologic: Grossly normal no focal deficits    Lab Data Review:  Recent Labs     25  1406 25  1648 25  1745 25  2120 25  0451   WBC 1.0* 7.1 8.9 13.3* 12.9*   HGB 11.4* 9.4* 8.4* 9.6* 9.1*   .0 142.0* 140.0* 146.0* 155.0     Recent Labs     25  1406 25  0451   * 138  138   K 4.2 4.2  4.2   CL 93* 106  106   CO2 22.0 24.0  24.0   BUN 22 17  17   CREATSERUM 1.86* 1.77*  1.77*     Recent  Labs   Lab 04/05/25  1406 04/06/25  0451   PTP 15.7* 19.2*   INR 1.24* 1.60*   PTT 33.0  --        Cultures:   No results found for this visit on 04/05/25.    Radiology:  XR OR - N/C  Narrative: PROCEDURE:  XR OR - N/C     COMPARISON:  None.     INDICATIONS:  Cysto     TECHNIQUE:       FLUOROSCOPY IMAGES OBTAINED:  1 image   FLUOROSCOPY TIME:  0:21 minutes  TECHNOLOGIST TIME:  42 minutes  RADIATION DOSE (AIR KERMA PRODUCT):  160.7uGy/m2                      Impression: CONCLUSION:  1 fluoroscopic image(s) obtained and submitted during urologic procedure.  No radiologist was present for the exam.  Correlation with real-time fluoroscopy and operative report are recommended.        LOCATION:  [place]           Dictated by (CST): Davian Gama MD on 4/05/2025 at 11:45 PM       Finalized by (CST): Davian Gama MD on 4/05/2025 at 11:45 PM     CT ABDOMEN+PELVIS(CONTRAST ONLY)(CPT=74177)  Narrative: PROCEDURE:  CT ABDOMEN+PELVIS (CONTRAST ONLY) (CPT=74177)     COMPARISON:  [facility] CT, CT ABDOMEN+PELVIS KIDNEYSTONE 2D RNDR(NO IV,NO ORAL)(CPT=74176), 4/02/2021, 12:20 PM.  [facility] , MR, MRI ABDOMEN&MRCP W/3D (W+W0)(CPT=74183/62637), 4/09/2021, 3:09 PM.     INDICATIONS:  syncope     TECHNIQUE:  CT scanning was performed from the dome of the diaphragm to the pubic symphysis with non-ionic intravenous contrast material. Post contrast coronal MPR imaging was performed.  Dose reduction techniques were used. Dose information is   transmitted to the ACR (American College of Radiology) NRDR (National Radiology Data Registry) which includes the Dose Index Registry.     PATIENT STATED HISTORY:(As transcribed by Technologist)  Right upper and lower quadrant pain, nausea, syncopal episode, hypotension. History of Crohn's.      CONTRAST USED:  80cc of Isovue 370     FINDINGS:    LIVER:  No enlargement, atrophy, abnormal density, or significant focal lesion.    BILIARY:  There is cholelithiasis and gallbladder wall  thickening/pericholecystic fluid.  The gallbladder is nondistended.  No significant biliary ductal dilation.  PANCREAS:  No lesion, fluid collection, ductal dilatation, or atrophy.    SPLEEN:  No enlargement or focal lesion.    KIDNEYS:  There is a 5 mm obstructing calculus in the left distal ureter with mild upstream hydroureteronephrosis.  There is left perinephric fat stranding.  There are multiple nonobstructing renal calculi bilaterally.  There is a 3.9 cm right renal   cyst.  ADRENALS:  No mass or enlargement.    AORTA/VASCULAR:  Atherosclerotic disease without aneurysm.  RETROPERITONEUM:  No mass or adenopathy.    BOWEL/MESENTERY:  Postsurgical changes with multiple bowel anastomoses.  No dilated bowel or wall thickening.  ABDOMINAL WALL:  No significant findings  URINARY BLADDER:  Circumferential urinary bladder wall thickening.  No calculus.  PELVIC NODES:  No adenopathy.    PELVIC ORGANS:  No visible mass.  Pelvic organs appropriate for patient age.    BONES:  Scoliosis.  Degenerative changes of the spine.  LUNG BASES:  Bibasilar atelectasis.  OTHER:  Negative.                     Impression: CONCLUSION:    1. Obstructing 5 mm calculus within the left distal ureter with mild upstream hydroureteronephrosis.  There is left perinephric fat stranding and urothelial hyperenhancement, correlate clinically for underlying infection.  2. Circumferential urinary bladder wall thickening, correlate for cystitis.  3. There is cholelithiasis and gallbladder wall thickening/pericholecystic fluid.  The gallbladder is otherwise nondistended, correlate clinically for underlying acute cholecystitis.  4. Please see above for further details.        LOCATION:  [place]        Dictated by (CST): Davian Gama MD on 4/05/2025 at 6:18 PM       Finalized by (CST): Davian Gama MD on 4/05/2025 at 6:23 PM     XR CHEST AP PORTABLE  (CPT=71045)  Narrative: PROCEDURE:  XR CHEST AP PORTABLE  (CPT=71045)     TECHNIQUE:  AP chest  radiograph was obtained.     COMPARISON:  [facility], XR, XR CHEST PA + LAT CHEST (CPT=71046), 12/20/2022, 11:48 AM.  [facility] , XR, XR CHEST AP PORTABLE  (CPT=71045), 3/04/2021, 5:06 PM.     INDICATIONS:  syncope     PATIENT STATED HISTORY: (As transcribed by Technologist)  Per family, patient has had a fever along with nausea and vomiting.         FINDINGS:  Stable cardiac size.  Small left-sided pleural effusion with minimal left basilar atelectasis/infiltrates.  Hyperexpansion of the lungs.  Atheromatous calcifications of the aorta.                   Impression: CONCLUSION:    1. Hyperexpansion of the lungs.  2. Small left-sided pleural effusion with minimal left basilar atelectasis/infiltrates.          LOCATION:  [place]                 Dictated by (CST): Luis Armando Dash MD on 4/05/2025 at 3:31 PM       Finalized by (CST): Luis Armando Dash MD on 4/05/2025 at 3:31 PM         Medications reviewed     Assessment and Plan:   Patient Active Problem List   Diagnosis    Senile osteoporosis    Coronary atherosclerosis of native coronary artery    Ulcerative (chronic) enterocolitis (HCC)    Hyperlipidemia    Trigger ring finger of right hand    Stented coronary artery    Type 2 diabetes mellitus without complication, without long-term current use of insulin (HCC)    Pouchitis (HCC)    Pruritus    Iron deficiency anemia due to chronic blood loss    Gallstones    Renal stones    Protein-calorie malnutrition, unspecified severity (HCC)    Other osteoporosis without current pathological fracture    Syncope    Gastritis    Essential hypertension    Neutropenic fever    Septic shock (HCC)    Acute cystitis without hematuria    Hematemesis with nausea    Ulcerative colitis with complication (HCC)    Crohn's disease with complication, unspecified gastrointestinal tract location (HCC)    Hyponatremia    Acute renal failure superimposed on chronic kidney disease, unspecified acute renal failure type, unspecified CKD stage     Kidney stone    Calculus of gallbladder without cholecystitis without obstruction     Septic Shock 2/2 utI and probable infected stone s/p stent placement with urology 4/5   - monitor off pressers   - continue broad spectrum abx, tailor as able      Neutropenic sepsis, probable lab error   - I discussed with heme onc no need for them to see I have canceled consult      Possible hemeatemsis?              - ER noted melena but family saying no blood on vomit              - GI consulted for crohn's disease, appreciate their input              - PPI for 8 weeks they have signed off     UC on entyvio as outpatient              - consult GI appreciate recs     Near syncope              - probable due to sepsis     VIC, better              - likely prerenal              - received 30 ml/kg fluid bolus     Transfer to floor to await cultures and urology recs      The 21st Century Cures Act makes medical notes like these available to patients in the interest of transparency. Please be advised this is a medical document. Medical documents are intended to carry relevant information, facts as evident, and the clinical opinion of the practitioner. The medical note is intended as peer to peer communication and may appear blunt or direct. It is written in medical language and may contain abbreviations or verbiage that are unfamiliar.      Marian Mitchell MD

## 2025-04-06 NOTE — OPERATIVE REPORT
[facility]   part of [facility]    Operative Note         Kj Condon Location: OR   [facility] [id number] MRN [id number]   Admission Date 4/5/2025 Operation Date 4/5/2025   Attending Physician Galileo Baeza*       Patient Name: Kj Condon     Preoperative Diagnosis: Sepsis (HCC) [A41.9]     Postoperative Diagnosis: * No post-op diagnosis entered *     Procedure(s):  CYSTOSCOPY, LEFT RETROGRADE PYELOGRAM, LEFT URETERAL STENT INSERTION     Primary Surgeon: Galileo Baeza MD           Anesthesia: General     Specimen: * No specimens in log *     Estimated Blood Loss: No data recorded     Complications: none      Indications for procedure: SIRS  and Butler      Surgical Findings: Moderate Hydro down to the stone      Complexity:  (optional)    Operative Summary:      The patient was well identified in the operating room table and then prepped and draped in the usual sterile fashion in the lithotomy position.  All pressure points were padded.  She underwent a standard timeout procedure and received IV  antibiotics and Venodyne's within 1 hour of our surgery.  We initially performed rigid cystoscopy which noted a normal bladder bladder with  no evidence of tumor or stone in the bladder.  We identified the LEFT  ureteral orifice and noted there was already a moderate hydro nephrogram down to the ureteral stone. No need to do a  retrograde urogram We then placed a sensor guidewire under fluoroscopy and then used a dual-lumen exchange catheter to place a Glidewire up into the kidney. The bladder was very cloudy like a snow  globe from infection . Over the guidewire we placed a 6 x 24   double-J stent , good curls in the kidney and bladder noted.  Nava was placed 16 Fr to gravity The patient was then  subsequently transferred to the ICU in stable condition.      Implants: * No implants in log *     Drains: Nava and 6 x 24      Condition: SIRS         Galileo Baeza MD

## 2025-04-07 LAB
ANION GAP SERPL CALC-SCNC: 8 MMOL/L (ref 0–18)
BASOPHILS # BLD AUTO: 0.03 X10(3) UL (ref 0–0.2)
BASOPHILS NFR BLD AUTO: 0.4 %
BUN BLD-MCNC: 20 MG/DL (ref 9–23)
CALCIUM BLD-MCNC: 8.8 MG/DL (ref 8.7–10.6)
CHLORIDE SERPL-SCNC: 109 MMOL/L (ref 98–112)
CO2 SERPL-SCNC: 22 MMOL/L (ref 21–32)
CREAT BLD-MCNC: 1.38 MG/DL
EGFRCR SERPLBLD CKD-EPI 2021: 39 ML/MIN/1.73M2 (ref 60–?)
EOSINOPHIL # BLD AUTO: 0.31 X10(3) UL (ref 0–0.7)
EOSINOPHIL NFR BLD AUTO: 3.7 %
ERYTHROCYTE [DISTWIDTH] IN BLOOD BY AUTOMATED COUNT: 14.8 %
GLUCOSE BLD-MCNC: 170 MG/DL (ref 70–99)
GLUCOSE BLD-MCNC: 183 MG/DL (ref 70–99)
GLUCOSE BLD-MCNC: 226 MG/DL (ref 70–99)
GLUCOSE BLD-MCNC: 228 MG/DL (ref 70–99)
GLUCOSE BLD-MCNC: 348 MG/DL (ref 70–99)
HCT VFR BLD AUTO: 27.1 %
HGB BLD-MCNC: 8.9 G/DL
IMM GRANULOCYTES # BLD AUTO: 0.06 X10(3) UL (ref 0–1)
IMM GRANULOCYTES NFR BLD: 0.7 %
LYMPHOCYTES # BLD AUTO: 0.6 X10(3) UL (ref 1–4)
LYMPHOCYTES NFR BLD AUTO: 7.1 %
MAGNESIUM SERPL-MCNC: 1.5 MG/DL (ref 1.6–2.6)
MCH RBC QN AUTO: 26.8 PG (ref 26–34)
MCHC RBC AUTO-ENTMCNC: 32.8 G/DL (ref 31–37)
MCV RBC AUTO: 81.6 FL
MONOCYTES # BLD AUTO: 0.34 X10(3) UL (ref 0.1–1)
MONOCYTES NFR BLD AUTO: 4 %
NEUTROPHILS # BLD AUTO: 7.13 X10 (3) UL (ref 1.5–7.7)
NEUTROPHILS # BLD AUTO: 7.13 X10(3) UL (ref 1.5–7.7)
NEUTROPHILS NFR BLD AUTO: 84.1 %
OSMOLALITY SERPL CALC.SUM OF ELEC: 295 MOSM/KG (ref 275–295)
PLATELET # BLD AUTO: 148 10(3)UL (ref 150–450)
POTASSIUM SERPL-SCNC: 3.4 MMOL/L (ref 3.5–5.1)
RBC # BLD AUTO: 3.32 X10(6)UL
SODIUM SERPL-SCNC: 139 MMOL/L (ref 136–145)
WBC # BLD AUTO: 8.5 X10(3) UL (ref 4–11)

## 2025-04-07 PROCEDURE — 99233 SBSQ HOSP IP/OBS HIGH 50: CPT | Performed by: INTERNAL MEDICINE

## 2025-04-07 RX ORDER — MAGNESIUM OXIDE 400 MG/1
800 TABLET ORAL ONCE
Status: COMPLETED | OUTPATIENT
Start: 2025-04-07 | End: 2025-04-07

## 2025-04-07 RX ORDER — INSULIN DEGLUDEC 100 U/ML
5 INJECTION, SOLUTION SUBCUTANEOUS NIGHTLY
Status: DISCONTINUED | OUTPATIENT
Start: 2025-04-07 | End: 2025-04-09

## 2025-04-07 RX ORDER — POTASSIUM CHLORIDE 1500 MG/1
40 TABLET, EXTENDED RELEASE ORAL ONCE
Status: COMPLETED | OUTPATIENT
Start: 2025-04-07 | End: 2025-04-07

## 2025-04-07 NOTE — PLAN OF CARE
Received report from ICU RN Javi. Patient oriented to her room and call light. She reports being very sleepy d/t taking Ambien at bedtime. She continues on IV fluids and IV antibiotics. No complaints of pain. Bed alarm active and patient encouraged to call nursing staff for assistance when getting out of bed.

## 2025-04-07 NOTE — PROGRESS NOTES
Alert & oriented x4.Denies pain.Afebrile.Tolerated diet.Up in room and ambulated in hallway with walker and standby assist.Refused Lovenox -- notified.Plan of care discussed with patient.All questions and concerns addressed..

## 2025-04-07 NOTE — OCCUPATIONAL THERAPY NOTE
OCCUPATIONAL THERAPY EVALUATION - INPATIENT    Room Number: 321/321-A  Evaluation Date: 4/7/2025     Type of Evaluation: Initial  Presenting Problem: Neutropenic fever    Physician Order: IP Consult to Occupational Therapy  Reason for Therapy:  ADL/IADL Dysfunction and Discharge Planning    OCCUPATIONAL THERAPY ASSESSMENT   Patient is a 79 year old female admitted on 4/5/2025 with Presenting Problem: Neutropenic fever. Co-Morbidities : h/o ulcerative colitis  Patient is currently functioning near baseline with toileting, lower body dressing, grooming, bed mobility, transfers, static sitting balance, dynamic sitting balance, static standing balance, dynamic standing balance, maintaining seated position, functional standing tolerance, energy conservation strategies, and aerobic capacity.  Prior to admission, patient's baseline is IND with ADLs/IADLs.  Patient met all OT goals at supervision-SBA level. Anticipate pt to reach baseline at discharge with continued OOB mobility and function with nursing staff.  Patient reports no further questions/concerns at this time.     Patient will benefit from continued skilled OT Services with no additional skilled services but increased support at home    Recommendations for nursing staff:   Transfers: up x 1 assist, supervision-SBA, RW PRN  Toileting location: Toilet    EVALUATION SESSION:  Patient at start of session: Seated upright in chair    FUNCTIONAL TRANSFER ASSESSMENT  Sit to Stand: Chair  Chair: Supervision  Toilet Transfer: Supervision    BED MOBILITY  Sit to Supine (OT): Supervision    BALANCE ASSESSMENT  Static Sitting: Supervision  Static Standing: Supervision  Dynamic Sitting: Supervision  Dynamic Standing: Stand-by Assist    FUNCTIONAL ADL ASSESSMENT  Grooming Standing: Supervision (hand hygiene at sink)  LB Dressing Standing: Supervision (simulated donning/doffing brief at toilet)  Toileting Seated: Supervision    ACTIVITY TOLERANCE: WFL; pt noted with one slight  loss of balance during standing activity, however pt able to catch self, reported the floor made her unsteady.                          O2 SATURATIONS       COGNITION  Overall Cognitive Status:  WFL - within functional limits    COGNITION ASSESSMENTS     Upper Extremity:   ROM: within functional limits  Strength: is within functional limits    EDUCATION PROVIDED  Patient Education : Role of Occupational Therapy; Plan of Care; DME Recommendations; Functional Transfer Techniques; Fall Prevention; Posture/Positioning; Energy Conservation; Proper Body Mechanics  Patient's Response to Education: Verbalized Understanding; Returned Demonstration    Equipment used: none  Demonstrates functional use    Therapist comments: RN cleared pt for session, received sitting upright in chair. Pt tolerated dynamic standing activity ~ 5 min to increase tolerance for standing ADLs/household distances, see activity tolerance section above. Pt encouraged to use RW at home as needed for additional support and stability once home, pt verbalized understanding. Pt encouraged to continue with OOB mobility and function with nursing staff to prevent further deconditioning while in house, pt verbalized understanding and in agreement. Pt reports her spouse will be able to assist as needed once home.    Patient End of Session: In bed, Needs met, Call light within reach, RN aware of session/findings, All patient questions and concerns addressed, Hospital anti-slip socks    OCCUPATIONAL PROFILE    HOME SITUATION  Type of Home: House  Home Layout: Multi-level  Lives With: Spouse    Toilet and Equipment: Standard height toilet  Shower/Tub and Equipment: Tub-shower combo, Walk-in shower  Other Equipment:  (RW)       Hand Dominance: Right  Drives: Yes       Prior Level of Function: IND with ADLs/IADLs, typically does not use any assistive devices for mobility. Pt reports she is an , enjoys doing puzzles and games to keep her mind  sharp.    SUBJECTIVE  \"You can put my purse in the closet\"    PAIN ASSESSMENT  Ratin          OBJECTIVE  Precautions: Bed/chair alarm  Fall Risk: High fall risk    WEIGHT BEARING RESTRICTION       AM-PAC ‘6-Clicks’ Inpatient Daily Activity Short Form  -   Putting on and taking off regular lower body clothing?: None  -   Bathing (including washing, rinsing, drying)?: None  -   Toileting, which includes using toilet, bedpan or urinal? : None  -   Putting on and taking off regular upper body clothing?: None  -   Taking care of personal grooming such as brushing teeth?: None  -   Eating meals?: None    AM-PAC Score:  Score: 24  Approx Degree of Impairment: 0%  Standardized Score (AM-PAC Scale): 57.54    ADDITIONAL TESTS     NEUROLOGICAL FINDINGS      PLAN   Patient has been evaluated and presents with no skilled Occupational Therapy needs at this time.  Patient discharged from Occupational Therapy services.  Please re-order if a new functional limitation presents during this admission.    OT Device Recommendations:  (RW)    Patient Evaluation Complexity Level:   Occupational Profile/Medical History LOW - Brief history including review of medical or therapy records    Specific performance deficits impacting engagement in ADL/IADL LOW  1 - 3 performance deficits    Client Assessment/Performance Deficits LOW - No comorbidities nor modifications of tasks    Clinical Decision Making LOW - Analysis of occupational profile, problem-focused assessments, limited treatment options    Overall Complexity LOW     OT Session Time: 20 minutes  Self-Care Home Management: 12 minutes

## 2025-04-07 NOTE — CM/SW NOTE
04/07/25 1000   CM/SW Referral Data   Referral Source Physician   Reason for Referral Discharge planning   Informant EMR;Patient   Medical Hx   Does patient have an established PCP? Yes  (Dr. You, Lourdes LAINEZ MD)   Significant Past Medical/Mental Health Hx CAD, DM2, Ulcerative colitis, HLD   Patient Info   Advanced directives? No   Patient's Current Mental Status at Time of Assessment Alert   Patient's Home Environment House   Number of Levels in Home 2   Patient lives with Spouse/Significant other   Patient Status Prior to Admission   Independent with ADLs and Mobility Yes   Discharge Needs   Anticipated D/C needs No anticipated discharge needs     CM met w/ pt to assess for DC needs. Pt lives w/ her family in a multi-level home. Pt is independent with ADLs and requires no assistive devices. PT evaluated pt and no skilled therapy needs identified. Pt inquired about supportive services that would be covered by pt's insurance. Skilled services d/w pt. Pt inquired about caregiver support and home modification. Pt denies having LTC insurance. We discussed that retirement care giver and home modification is not covered by medicare.  Pt offered resources for both caregivers and home modification, but pt declines as it is not covered by insurance.     D/w pt that CM will continue to follow for any newly developing needs at DC. Pt encouraged to reach out with questions or concerns.     CM/SW will remain available for DC planning and/or support.     RODO Yarbrough, CMSRN    [id number]

## 2025-04-07 NOTE — PROGRESS NOTES
[facility]   part of [facility]     Hospitalist Progress Note     Kj Condon Patient Status:  Inpatient    10/15/1945 MRN [id number]   Location [facility] 4SW-A Attending Galileo Baeza*   Hosp Day # 2 PCP Avinash You MD     Chief Complaint: near syncope    Subjective:     Denies abdominal pain, nausea, vomiting. Reports stool incontinence.  Tmax 101.     Objective:    Review of Systems:   A comprehensive review of systems was completed; pertinent positive and negatives stated in subjective.    Vital signs:  Temp:  [97.7 °F (36.5 °C)-101 °F (38.3 °C)] 97.7 °F (36.5 °C)  Pulse:  [79-98] 98  Resp:  [16-32] 16  BP: ()/(39-65) 156/63  SpO2:  [93 %-99 %] 99 %    Physical Exam:    General: No acute distress  Respiratory: No wheezes, no rhonchi  Cardiovascular: S1, S2, regular rate and rhythm  Abdomen: Soft, Non-tender, non-distended, positive bowel sounds  Neuro: No new focal deficits.   Extremities: No edema    Diagnostic Data:    Labs:  Recent Labs   Lab 25  1406 25  1648 25  1745 25  2120 25  0451 25  0506   WBC 1.0* 7.1 8.9 13.3* 12.9* 8.5   HGB 11.4* 9.4* 8.4* 9.6* 9.1* 8.9*   MCV 81.6 79.8* 81.4 81.1 81.4 81.6   .0 142.0* 140.0* 146.0* 155.0 148.0*   INR 1.24*  --   --   --  1.60*  --        Recent Labs   Lab 25  1406 25  0451 25  0506   * 149*  149* 170*   BUN 22 17  17 20   CREATSERUM 1.86* 1.77*  1.77* 1.38*   CA 10.0 8.7  8.7 8.8   ALB 4.3 3.2  --    * 138  138 139   K 4.2 4.2  4.2 3.4*   CL 93* 106  106 109   CO2 22.0 24.0  24.0 22.0   ALKPHO 98 54*  --    AST 31 34*  --    ALT 18 15  --    BILT 2.4* 1.7*  --    TP 7.1 5.4*  --        Estimated Glomerular Filtration Rate: 39 mL/min/1.73m2 (A) (result from lab).    No results for input(s): \"TROP\", \"TROPHS\", \"CK\" in the last 168 hours.    Recent Labs   Lab 25  1406 25  0451   PTP 15.7* 19.2*   INR 1.24* 1.60*                   Microbiology    Hospital Encounter on 04/05/25   1. Urine Culture, Routine     Status: None    Collection Time: 04/05/25  3:56 PM    Specimen: Urine, clean catch   Result Value Ref Range    Urine Culture  N/A     <10,000 cfu/ml Multiple species present- probable contamination.   2. Blood Culture     Status: None (Preliminary result)    Collection Time: 04/05/25  3:09 PM    Specimen: Blood,peripheral   Result Value Ref Range    Blood Culture Result No Growth 1 Day N/A         Imaging: Reviewed in Epic.    Medications:    pantoprazole  40 mg Oral BID AC    aspirin  81 mg Oral Daily    lidocaine-menthol  1 patch Transdermal Daily    triamcinolone   Topical BID    atorvastatin  20 mg Oral Nightly    insulin aspart  1-10 Units Subcutaneous TID AC and HS    sennosides  17.2 mg Oral Nightly    piperacillin-tazobactam  4.5 g Intravenous Q8H    enoxaparin  30 mg Subcutaneous Daily       Assessment & Plan:      #Septic shock 2/2 Neutropenic fever  #Complicated UTI with obstructing stone  Presented with near syncope, nausea, vomiting. CT with obstructing 5 mm calculus at L distal ureter with mild hydro. Left perinephric fat stranding.   -Off pressors since 4/6  -IV zosyn (4/5 - ); dc vancomycin   -Blood cultures NGTD, urine culture negative  -Blood counts recovered    #Hematemesis - resolved  #Ulcerative colitis   Patient is on Entyvio as outpatient.GI evaluated and likely 2/2 n/v. Hgb stable so GI signed off.     #Costochondritis - lidocaine patch, prn tylenol     #Left obstructing stone with mild upstream hydroureteronephrosis s/p cystoscopy and stent placement 4/5     #Near syncope - likely 2/2 hypotension on presentation, CTM with IVF     #Hypomagnesemia - supplement per protocol     #Hyponatremia   #Normocytic anemia   #Thrombocytopenia 2/2 sepsis   #VIC/CKD, likely hypovolemic - improving  # CAD - holding ASA; continue statin   # HTN - hold home BP meds while hypotensive   # DM2 - LDSSI; Last A1c value was 7.7%  done 12/17/2024.  # Hyperlipidemia - continue home statin     Mady Amador MD    Supplementary Documentation:     Quality:  DVT Mechanical Prophylaxis:   SCDs, Early ambuation  DVT Pharmacologic Prophylaxis   Medication    enoxaparin (Lovenox) 30 MG/0.3ML SUBQ injection 30 mg         DVT Pharmacologic prophylaxis: Aspirin 81 mg      Code Status: Full Code  Nava: No urinary catheter in place  Nava Duration (in days):   Central line:    MARINO:     Discharge is dependent on: course  At this point Ms. Condon is expected to be discharge to: home pending improvement of sepsis     The 21st Century Cures Act makes medical notes like these available to patients in the interest of transparency. Please be advised this is a medical document. Medical documents are intended to carry relevant information, facts as evident, and the clinical opinion of the practitioner. The medical note is intended as peer to peer communication and may appear blunt or direct. It is written in medical language and may contain abbreviations or verbiage that are unfamiliar.              **Certification      PHYSICIAN Certification of Need for Inpatient Hospitalization - Initial Certification    Patient will require inpatient services that will reasonably be expected to span two midnight's based on the clinical documentation in H+P.   Based on patients current state of illness, I anticipate that, after discharge, patient will require TBD.

## 2025-04-07 NOTE — PROGRESS NOTES
Physician Clarification    Additional information related to the patient's marked neutropenia on lab result from 4/5/2025.     Neutropenia and neutropenic fever ruled out     This note is part of the patient's medical record.

## 2025-04-07 NOTE — PHYSICAL THERAPY NOTE
PHYSICAL THERAPY EVALUATION - INPATIENT     Room Number: 321/321-A  Evaluation Date: 2025  Type of Evaluation: Initial  Physician Order: PT Eval and Treat    Presenting Problem: near syncope  Co-Morbidities : h/o ulcerative colitis  Reason for Therapy: Mobility Dysfunction and Discharge Planning    PHYSICAL THERAPY ASSESSMENT   Patient is a 79 year old female admitted 2025 for near syncope.  Pt underwent urinary stent placement for UTI.   Patient is currently functioning near baseline with bed mobility, transfers, gait, and stair negotiation. Prior to admission, patient's baseline is ind.       PLAN  Patient currently does not meet criteria for skilled inpatient physical therapy services, however patient will remain on Inpatient Mobility Team and will continue with encouraged ambulation to maintain current level of mobility.   The rehab aide will perform treatment activities prescribed by this physical therapist. The rehab aide will communicate with overseeing PT regarding any change in functional mobility. RN aware.            GOALS  Patient was able to achieve the following goals ...    Patient was able to transfer At previous, functional level   Patient able to ambulate on level surfaces At previous, functional level     HOME SITUATION  Type of Home: House  Home Layout: Multi-level                                    Prior Level of [place]: Pt reports ind PTA.  Pt reports doing yoga regularly.    SUBJECTIVE  \"It feels good to walk!\"    OBJECTIVE  Precautions: Bed/chair alarm  Fall Risk: High fall risk    WEIGHT BEARING RESTRICTION     PAIN ASSESSMENT  Ratin          COGNITION  Orientation Level:  oriented x4  Following Commands:  follows all commands and directions without difficulty    RANGE OF MOTION AND STRENGTH ASSESSMENT  Upper extremity ROM and strength are within functional limits     Lower extremity ROM is within functional limits     Lower extremity strength is within functional limits      BALANCE  Static Sitting: Good  Dynamic Sitting: Good  Static Standing: Good  Dynamic Standing: Good    ADDITIONAL TESTS                                    ACTIVITY TOLERANCE                         O2 WALK       NEUROLOGICAL FINDINGS                        AM-PAC '6-Clicks' INPATIENT SHORT FORM - BASIC MOBILITY  How much difficulty does the patient currently have...  Patient Difficulty: Turning over in bed (including adjusting bedclothes, sheets and blankets)?: None   Patient Difficulty: Sitting down on and standing up from a chair with arms (e.g., wheelchair, bedside commode, etc.): None   Patient Difficulty: Moving from lying on back to sitting on the side of the bed?: None   How much help from another person does the patient currently need...   Help from Another: Moving to and from a bed to a chair (including a wheelchair)?: None   Help from Another: Need to walk in hospital room?: A Little   Help from Another: Climbing 3-5 steps with a railing?: A Little       AM-PAC Score:  Raw Score: 22   Approx Degree of Impairment: 20.91%   Standardized Score (AM-PAC Scale): 53.28   CMS Modifier (G-Code): CJ    FUNCTIONAL ABILITY STATUS  Gait Assessment   Functional Mobility/Gait Assessment  Gait Assistance: Supervision  Distance (ft): 500  Assistive Device: None  Pattern: Within Functional Limits (occasional LE crossover)  Stairs: Stairs  How Many Stairs: 2  Device: 1 Rail  Assist: Supervision (cues to hold onto railing)  Pattern: Ascend and Descend  Ascend and Descend : Step to    Skilled Therapy Provided         Transfer Mobility:  Sit to stand: ind   Stand to sit: ind  Gait = supervision for route finding    Therapist's comments:Pt repeatedly searching in test results for medication list.  Encouraged to d/w RN after session.  Pt impulsive at times, however demos mobility safely.      Exercise/Education Provided:  Body mechanics  Energy conservation  Functional activity tolerated  Gait training  Posture  Transfer  training    Patient End of Session: Up in chair, Needs met, Call light within reach, RN aware of session/findings, All patient questions and concerns addressed, Alarm set, Discussed recommendations with /    Patient Evaluation Complexity Level:  History Low - no personal factors and/or co-morbidities   Examination of body systems Low -  addressing 1-2 elements   Clinical Presentation Low- Stable   Clinical Decision Making Low Complexity       PT Session Time: 15 minutes    Therapeutic Activity: 8 minutes

## 2025-04-08 LAB
ALBUMIN SERPL-MCNC: 3.2 G/DL (ref 3.2–4.8)
ALBUMIN/GLOB SERPL: 1.3 {RATIO} (ref 1–2)
ALP LIVER SERPL-CCNC: 95 U/L
ALT SERPL-CCNC: 11 U/L
ANION GAP SERPL CALC-SCNC: 9 MMOL/L (ref 0–18)
ANION GAP SERPL CALC-SCNC: 9 MMOL/L (ref 0–18)
AST SERPL-CCNC: 34 U/L (ref ?–34)
BASOPHILS # BLD AUTO: 0.03 X10(3) UL (ref 0–0.2)
BASOPHILS NFR BLD AUTO: 0.6 %
BILIRUB SERPL-MCNC: 0.7 MG/DL (ref 0.2–1.1)
BUN BLD-MCNC: 15 MG/DL (ref 9–23)
BUN BLD-MCNC: 15 MG/DL (ref 9–23)
CALCIUM BLD-MCNC: 9 MG/DL (ref 8.7–10.6)
CALCIUM BLD-MCNC: 9 MG/DL (ref 8.7–10.6)
CHLORIDE SERPL-SCNC: 110 MMOL/L (ref 98–112)
CHLORIDE SERPL-SCNC: 110 MMOL/L (ref 98–112)
CO2 SERPL-SCNC: 22 MMOL/L (ref 21–32)
CO2 SERPL-SCNC: 22 MMOL/L (ref 21–32)
CREAT BLD-MCNC: 1.22 MG/DL
CREAT BLD-MCNC: 1.22 MG/DL
EGFRCR SERPLBLD CKD-EPI 2021: 45 ML/MIN/1.73M2 (ref 60–?)
EGFRCR SERPLBLD CKD-EPI 2021: 45 ML/MIN/1.73M2 (ref 60–?)
EOSINOPHIL # BLD AUTO: 0.33 X10(3) UL (ref 0–0.7)
EOSINOPHIL NFR BLD AUTO: 6.2 %
ERYTHROCYTE [DISTWIDTH] IN BLOOD BY AUTOMATED COUNT: 14.9 %
GLOBULIN PLAS-MCNC: 2.4 G/DL (ref 2–3.5)
GLUCOSE BLD-MCNC: 106 MG/DL (ref 70–99)
GLUCOSE BLD-MCNC: 106 MG/DL (ref 70–99)
GLUCOSE BLD-MCNC: 116 MG/DL (ref 70–99)
GLUCOSE BLD-MCNC: 133 MG/DL (ref 70–99)
GLUCOSE BLD-MCNC: 149 MG/DL (ref 70–99)
GLUCOSE BLD-MCNC: 259 MG/DL (ref 70–99)
GLUCOSE BLD-MCNC: 265 MG/DL (ref 70–99)
HCT VFR BLD AUTO: 27 %
HGB BLD-MCNC: 8.8 G/DL
IMM GRANULOCYTES # BLD AUTO: 0.03 X10(3) UL (ref 0–1)
IMM GRANULOCYTES NFR BLD: 0.6 %
LYMPHOCYTES # BLD AUTO: 0.77 X10(3) UL (ref 1–4)
LYMPHOCYTES NFR BLD AUTO: 14.4 %
MAGNESIUM SERPL-MCNC: 1.5 MG/DL (ref 1.6–2.6)
MCH RBC QN AUTO: 26.2 PG (ref 26–34)
MCHC RBC AUTO-ENTMCNC: 32.6 G/DL (ref 31–37)
MCV RBC AUTO: 80.4 FL
MONOCYTES # BLD AUTO: 0.45 X10(3) UL (ref 0.1–1)
MONOCYTES NFR BLD AUTO: 8.4 %
NEUTROPHILS # BLD AUTO: 3.75 X10 (3) UL (ref 1.5–7.7)
NEUTROPHILS # BLD AUTO: 3.75 X10(3) UL (ref 1.5–7.7)
NEUTROPHILS NFR BLD AUTO: 69.8 %
OSMOLALITY SERPL CALC.SUM OF ELEC: 293 MOSM/KG (ref 275–295)
OSMOLALITY SERPL CALC.SUM OF ELEC: 293 MOSM/KG (ref 275–295)
PLATELET # BLD AUTO: 149 10(3)UL (ref 150–450)
POTASSIUM SERPL-SCNC: 4 MMOL/L (ref 3.5–5.1)
PROT SERPL-MCNC: 5.6 G/DL (ref 5.7–8.2)
RBC # BLD AUTO: 3.36 X10(6)UL
SODIUM SERPL-SCNC: 141 MMOL/L (ref 136–145)
SODIUM SERPL-SCNC: 141 MMOL/L (ref 136–145)
WBC # BLD AUTO: 5.4 X10(3) UL (ref 4–11)

## 2025-04-08 PROCEDURE — 99232 SBSQ HOSP IP/OBS MODERATE 35: CPT | Performed by: INTERNAL MEDICINE

## 2025-04-08 RX ORDER — MAGNESIUM OXIDE 400 MG/1
800 TABLET ORAL ONCE
Status: COMPLETED | OUTPATIENT
Start: 2025-04-08 | End: 2025-04-08

## 2025-04-08 RX ORDER — ATENOLOL 25 MG/1
25 TABLET ORAL DAILY
Status: DISCONTINUED | OUTPATIENT
Start: 2025-04-08 | End: 2025-04-09

## 2025-04-08 RX ORDER — AMLODIPINE BESYLATE 5 MG/1
5 TABLET ORAL DAILY
Status: DISCONTINUED | OUTPATIENT
Start: 2025-04-08 | End: 2025-04-09

## 2025-04-08 RX ORDER — FUROSEMIDE 10 MG/ML
20 INJECTION INTRAMUSCULAR; INTRAVENOUS ONCE
Status: COMPLETED | OUTPATIENT
Start: 2025-04-08 | End: 2025-04-08

## 2025-04-08 NOTE — PROGRESS NOTES
Patient is alert and oriented x 4.   Vital signs stable. Afebrile. On room air with . Encouraged use of IS. NSR on telemetry.  On Carb Controlled/Vegetarian diet, tolerating well.   Denies nausea or vomiting. Abdomen soft, passing gas and belching.  Accuchecks QID. Insulin coverage per order.  Continent of bowel and bladder.  Patient c/o pain, analgesics given as ordered, with good relief.   Ambulating with standby assist.  On Iv Zosyn. Medications were given per MAR.  Plan of care discussed with patient; no further questions at this time.  All appropriate safety measures in place. Call light in reach.

## 2025-04-08 NOTE — PROGRESS NOTES
[facility]   part of [facility]     Hospitalist Progress Note     Kj Condon Patient Status:  Inpatient    10/15/1945 MRN [id number]   Location [facility] 4SW-A Attending Galileo Baeza*   Hosp Day # 3 PCP Avinash You MD     Chief Complaint: near syncope    Subjective:     Felt feverish yesterday. Reports cough and some SOB.     Objective:    Review of Systems:   A comprehensive review of systems was completed; pertinent positive and negatives stated in subjective.    Vital signs:  Temp:  [97.8 °F (36.6 °C)-99.6 °F (37.6 °C)] 97.8 °F (36.6 °C)  Pulse:  [75-99] 85  Resp:  [14-24] 18  BP: (124-163)/(37-63) 144/49  SpO2:  [95 %-99 %] 98 %    Physical Exam:    General: No acute distress  Respiratory:Bibasilar crackles  Cardiovascular: S1, S2, regular rate and rhythm  Abdomen: Soft, Non-tender, non-distended, positive bowel sounds  Neuro: No new focal deficits.   Extremities: No edema    Diagnostic Data:    Labs:  Recent Labs   Lab 25  1406 25  1648 25  1745 25  2120 25  0451 25  0506 25  0527   WBC 1.0*   < > 8.9 13.3* 12.9* 8.5 5.4   HGB 11.4*   < > 8.4* 9.6* 9.1* 8.9* 8.8*   MCV 81.6   < > 81.4 81.1 81.4 81.6 80.4   .0   < > 140.0* 146.0* 155.0 148.0* 149.0*   INR 1.24*  --   --   --  1.60*  --   --     < > = values in this interval not displayed.       Recent Labs   Lab 25  1406 25  0451 25  0506 25  0527   * 149*  149* 170* 106*  106*   BUN 22 17  17 20 15  15   CREATSERUM 1.86* 1.77*  1.77* 1.38* 1.22*  1.22*   CA 10.0 8.7  8.7 8.8 9.0  9.0   ALB 4.3 3.2  --  3.2   * 138  138 139 141  141   K 4.2 4.2  4.2 3.4* 4.0  4.0  4.0   CL 93* 106  106 109 110  110   CO2 22.0 24.0  24.0 22.0 22.0  22.0   ALKPHO 98 54*  --  95   AST 31 34*  --  34*   ALT 18 15  --  11   BILT 2.4* 1.7*  --  0.7   TP 7.1 5.4*  --  5.6*       Estimated Glomerular Filtration Rate: 45 mL/min/1.73m2 (A) (result from  lab).    No results for input(s): \"TROP\", \"TROPHS\", \"CK\" in the last 168 hours.    Recent Labs   Lab 04/05/25  1406 04/06/25  0451   PTP 15.7* 19.2*   INR 1.24* 1.60*                  Microbiology    Hospital Encounter on 04/05/25   1. Urine Culture, Routine     Status: None    Collection Time: 04/05/25  3:56 PM    Specimen: Urine, clean catch   Result Value Ref Range    Urine Culture  N/A     <10,000 cfu/ml Multiple species present- probable contamination.   2. Blood Culture     Status: None (Preliminary result)    Collection Time: 04/05/25  3:09 PM    Specimen: Blood,peripheral   Result Value Ref Range    Blood Culture Result No Growth 2 Days N/A         Imaging: Reviewed in Epic.    Medications:    atenolol  25 mg Oral Daily    furosemide  20 mg Intravenous Once    insulin degludec  5 Units Subcutaneous Nightly    pantoprazole  40 mg Oral BID AC    aspirin  81 mg Oral Daily    lidocaine-menthol  1 patch Transdermal Daily    triamcinolone   Topical BID    atorvastatin  20 mg Oral Nightly    insulin aspart  1-10 Units Subcutaneous TID AC and HS    sennosides  17.2 mg Oral Nightly    piperacillin-tazobactam  4.5 g Intravenous Q8H    enoxaparin  30 mg Subcutaneous Daily       Assessment & Plan:      #Septic shock   #Complicated UTI with obstructing stone  Presented with near syncope, nausea, vomiting. CT with obstructing 5 mm calculus at L distal ureter with mild hydro. Left perinephric fat stranding.   -Off pressors since 4/6  -IV zosyn (4/5 - ); dc vancomycin   -ID consult for antibiotic plan at discharge     #SOB, cough  -Lasix 20 mg x 1 dose    #Hematemesis - resolved  #Ulcerative colitis   Patient is on Entyvio as outpatient.GI evaluated and likely 2/2 n/v. Hgb stable so GI signed off.     #Costochondritis - lidocaine patch, prn tylenol     #Left obstructing stone with mild upstream hydroureteronephrosis s/p cystoscopy and stent placement 4/5     #Near syncope - likely 2/2 hypotension on presentation, CTM with  IVF     #Hypomagnesemia - supplement per protocol     #Hyponatremia   #Normocytic anemia   #Thrombocytopenia 2/2 sepsis   #VIC/CKD, likely hypovolemic - improving  # CAD - holding ASA; continue statin   # HTN - hold home BP meds while hypotensive   # DM2 - LDSSI; Last A1c value was 7.7% done 12/17/2024.  # Hyperlipidemia - continue home statin     Mady Amador MD    Supplementary Documentation:     Quality:  DVT Mechanical Prophylaxis:   SCDs, Early ambuation  DVT Pharmacologic Prophylaxis   Medication    enoxaparin (Lovenox) 30 MG/0.3ML SUBQ injection 30 mg         DVT Pharmacologic prophylaxis: Aspirin 81 mg      Code Status: Full Code  Nava: No urinary catheter in place  Nava Duration (in days):   Central line:    MARINO:     Discharge is dependent on: course  At this point Ms. Condon is expected to be discharge to: home pending improvement of sepsis     The 21st Century Cures Act makes medical notes like these available to patients in the interest of transparency. Please be advised this is a medical document. Medical documents are intended to carry relevant information, facts as evident, and the clinical opinion of the practitioner. The medical note is intended as peer to peer communication and may appear blunt or direct. It is written in medical language and may contain abbreviations or verbiage that are unfamiliar.              **Certification      PHYSICIAN Certification of Need for Inpatient Hospitalization - Initial Certification    Patient will require inpatient services that will reasonably be expected to span two midnight's based on the clinical documentation in H+P.   Based on patients current state of illness, I anticipate that, after discharge, patient will require TBD.

## 2025-04-08 NOTE — CONSULTS
INFECTIOUS DISEASE CONSULTATION    Kj Condon Patient Status:  Inpatient    10/15/1945 MRN [id number]   Location [facility] [place] 3NW-A Attending Galileo Baeza*   Hosp Day # 3 PCP Avinash You MD       Requested by Dr. Amador    Reason for Consultation:  Antibiotics sp cysto, stent for ureteral stone    History of Present Illness:  Kj Condon is a a(n) 79 year old female presented to ED on  with dizziness and had a fever in the preceding day. UA noted to be positive (> 50 WBC and WBC clumps),   She received 1st dose of PTZ on  at 15:57, after urine culture sent.   CT shows left ureteral obstructing stone, and underwent cysto and stent on .  Urology noted that the bladder was cloudy.     Urine culture was reported as <10,000 colonies and not worked up.   Called lab to make further inquiry and they noted only staph species and diptheroids.  There were no gram negative organisms.            History:  Past Medical History:    Blood in the stool    CAD (coronary artery disease)    Dr. Montero     Calculus of kidney    Colitis    Constipation    Coronary atherosclerosis of native coronary artery    Diabetes (HCC)    Displacement of lumbar intervertebral disc without myelopathy    Dizziness    DVT (deep venous thrombosis) (HCC)    Frequent use of laxatives    Gall stones    Hemorrhoids    High cholesterol    Hip fracture (HCC)    History of blood transfusion    Irregular bowel habits    Kidney stones    Lumbar disc disease    Osteoporosis    Senile osteoporosis    Shortness of breath    Stented coronary artery    Stool incontinence    Stress fracture, left fibula, initial encounter for fracture    Weight loss     Past Surgical History:   Procedure Laterality Date    Angioplasty (coronary)      Colectomy      Egd      Fracture surgery Right     ankle    Lithotripsy      Lmbr epidural steroid inj, physiatry (internal)      Sigmoidoscopy,diagnostic        Family History   Problem Relation Age of Onset    Heart Surgery Father         CABG    Diabetes Mother         Mellitus       reports that she has never smoked. She has never used smokeless tobacco. She reports that she does not drink alcohol and does not use drugs.      Allergies:  Allergies[1]    Medications:    Current Facility-Administered Medications:     atenolol (Tenormin) tab 25 mg, 25 mg, Oral, Daily    insulin degludec (Tresiba) 100 units/mL flextouch 5 Units, 5 Units, Subcutaneous, Nightly    pantoprazole (Protonix) DR tab 40 mg, 40 mg, Oral, BID AC    aspirin DR tab 81 mg, 81 mg, Oral, Daily    lidocaine-menthol 4-1 % patch 1 patch, 1 patch, Transdermal, Daily    hydrALAzine (Apresoline) 20 mg/mL injection 10 mg, 10 mg, Intravenous, Q4H PRN    triamcinolone (Kenalog) 0.1 % cream, , Topical, BID    atorvastatin (Lipitor) tab 20 mg, 20 mg, Oral, Nightly    glucose (Dex4) 15 GM/59ML oral liquid 15 g, 15 g, Oral, Q15 Min PRN **OR** glucose (Glutose) 40% oral gel 15 g, 15 g, Oral, Q15 Min PRN **OR** glucose-vitamin C (Dex-4) chewable tab 4 tablet, 4 tablet, Oral, Q15 Min PRN **OR** dextrose 50% injection 50 mL, 50 mL, Intravenous, Q15 Min PRN **OR** glucose (Dex4) 15 GM/59ML oral liquid 30 g, 30 g, Oral, Q15 Min PRN **OR** glucose (Glutose) 40% oral gel 30 g, 30 g, Oral, Q15 Min PRN **OR** glucose-vitamin C (Dex-4) chewable tab 8 tablet, 8 tablet, Oral, Q15 Min PRN    insulin aspart (NovoLOG) 100 Units/mL FlexPen 1-10 Units, 1-10 Units, Subcutaneous, TID AC and HS    acetaminophen (Tylenol Extra Strength) tab 1,000 mg, 1,000 mg, Oral, Q8H PRN    polyethylene glycol (PEG 3350) (Miralax) 17 g oral packet 17 g, 17 g, Oral, Daily PRN    sennosides (Senokot) tab 17.2 mg, 17.2 mg, Oral, Nightly PRN    bisacodyl (Dulcolax) 10 MG rectal suppository 10 mg, 10 mg, Rectal, Daily PRN    benzonatate (Tessalon) cap 200 mg, 200 mg, Oral, TID PRN    guaiFENesin (Robitussin) 100 MG/5 ML oral liquid 200 mg, 200 mg, Oral,  Q4H PRN    glycerin-hypromellose- (Artificial Tears) 0.2-0.2-1 % ophthalmic solution 1 drop, 1 drop, Both Eyes, QID PRN    sodium chloride (Saline Mist) 0.65 % nasal solution 1 spray, 1 spray, Each Nare, Q3H PRN    sennosides (Senokot) tab 17.2 mg, 17.2 mg, Oral, Nightly    ondansetron (Zofran) 4 MG/2ML injection 4 mg, 4 mg, Intravenous, Q6H PRN    metoclopramide (Reglan) 5 mg/mL injection 5 mg, 5 mg, Intravenous, Q8H PRN    melatonin tab 3 mg, 3 mg, Oral, Nightly PRN    oxybutynin (Ditropan) tab 5 mg, 5 mg, Oral, Q6H PRN    benzocaine-menthol (Cepacol) lozenge 1 lozenge, 1 lozenge, Buccal, Q15 Min PRN    piperacillin-tazobactam (Zosyn) 4.5 g in dextrose 5% 100 mL IVPB-ADDV, 4.5 g, Intravenous, Q8H    zolpidem (Ambien) tab 5 mg, 5 mg, Oral, Nightly PRN    enoxaparin (Lovenox) 30 MG/0.3ML SUBQ injection 30 mg, 30 mg, Subcutaneous, Daily  Medications Ordered Prior to Encounter[2]    Review of Systems:    A comprehensive 10 point review of systems was completed.  Pertinent positives and negatives noted in the the HPI.      Physical Exam:    General: No acute distress. Alert and oriented x 3.  Vital signs: Temp:  [97.7 °F (36.5 °C)-99.6 °F (37.6 °C)] 98.1 °F (36.7 °C)  Pulse:  [75-99] 98  Resp:  [14-24] 14  BP: ()/(37-63) 159/56  SpO2:  [95 %-99 %] 99 %  Body mass index is 20.89 kg/m².  HEENT: Moist mucous membranes. Extraocular muscles are intact.  Neck: No swelling, no masses  Respiratory: Non labored, symmetric exursion  Cardiovascular: No irregularities in rhythm  Abdomen: Soft, nontender, nondistended.    Musculoskeletal: Full range of motion of all extremities.  No swelling noted.  Joints: no effusions  Skin: No lesions. No erythema, no open wounds    Laboratory Data:  Laboratory data reviewed      Recent Labs   Lab 04/08/25  0527   RBC 3.36*   HGB 8.8*   HCT 27.0*   MCV 80.4   MCH 26.2   MCHC 32.6   RDW 14.9   NEPRELIM 3.75   WBC 5.4   .0*       Recent Labs   Lab 04/05/25  1406 04/06/25  0451  04/07/25  0506 04/08/25  0527   * 149*  149* 170* 106*  106*   BUN 22 17  17 20 15  15   CREATSERUM 1.86* 1.77*  1.77* 1.38* 1.22*  1.22*   CA 10.0 8.7  8.7 8.8 9.0  9.0   ALB 4.3 3.2  --  3.2   * 138  138 139 141  141   K 4.2 4.2  4.2 3.4* 4.0  4.0  4.0   CL 93* 106  106 109 110  110   CO2 22.0 24.0  24.0 22.0 22.0  22.0   ALKPHO 98 54*  --  95   AST 31 34*  --  34*   ALT 18 15  --  11   BILT 2.4* 1.7*  --  0.7   TP 7.1 5.4*  --  5.6*         Lab Results   Component Value Date    MG 1.5 04/08/2025    PGLU 116 04/08/2025         Microbiologic Data:   Hospital Encounter on 04/05/25   1. Urine Culture, Routine     Status: None    Collection Time: 04/05/25  3:56 PM    Specimen: Urine, clean catch   Result Value Ref Range    Urine Culture  N/A     <10,000 cfu/ml Multiple species present- probable contamination.   2. Blood Culture     Status: None (Preliminary result)    Collection Time: 04/05/25  3:09 PM    Specimen: Blood,peripheral   Result Value Ref Range    Blood Culture Result No Growth 2 Days N/A         Established Problem list:  Patient Active Problem List   Diagnosis    Senile osteoporosis    Coronary atherosclerosis of native coronary artery    Ulcerative (chronic) enterocolitis (HCC)    Hyperlipidemia    Trigger ring finger of right hand    Stented coronary artery    Type 2 diabetes mellitus without complication, without long-term current use of insulin (HCC)    Pouchitis (HCC)    Pruritus    Iron deficiency anemia due to chronic blood loss    Gallstones    Renal stones    Protein-calorie malnutrition, unspecified severity (HCC)    Other osteoporosis without current pathological fracture    Syncope    Gastritis    Essential hypertension    Neutropenic fever    Septic shock (HCC)    Acute cystitis without hematuria    Hematemesis with nausea    Ulcerative colitis with complication (HCC)    Crohn's disease with complication, unspecified gastrointestinal tract location (HCC)     Hyponatremia    Acute renal failure superimposed on chronic kidney disease, unspecified acute renal failure type, unspecified CKD stage    Kidney stone    Calculus of gallbladder without cholecystitis without obstruction       ASSESSMENT/PLAN:  1. Obstructing ureter stone sp cysto stent  Urine culture in ED showed < 10 K GP organisms (staph and diphteroids), no GNR  Fever improve on PTZ    Without micro to guide reasonable to streamline to Cefazolin and follow          Jessee Pulido MD, MD  [facility]  [phone]         [1] No Known Allergies  [2]   Current Facility-Administered Medications on File Prior to Encounter   Medication Dose Route Frequency Provider Last Rate Last Admin    [COMPLETED] vedolizumab (Entyvio) 300 mg in sodium chloride 0.9% 255 mL IVPB  300 mg Intravenous Once Reese Sanchez MD   Stopped at 02/26/25 1618     Current Outpatient Medications on File Prior to Encounter   Medication Sig Dispense Refill    ferrous sulfate 325 (65 FE) MG Oral Tab EC Take 1 tablet (325 mg total) by mouth 2 (two) times daily with meals. 60 tablet 0    aspirin (ASPIRIN EC LOW DOSE) 81 MG Oral Tab EC Take 1 tablet (81 mg total) by mouth daily. 90 tablet 3    Blood Glucose Monitoring Suppl (ONETOUCH VERIO REFLECT) w/Device Does not apply Kit Take 1 Bottle by mouth As Directed.      Lancets (ONETOUCH DELICA PLUS [id number]) Does not apply Misc 1 strip by In Vitro route daily.      zolpidem 10 MG Oral Tab Take 1 tablet (10 mg total) by mouth nightly as needed.      triamcinolone 0.1 % External Cream Apply topically 2 (two) times daily.      glipiZIDE ER 5 MG Oral Tablet 24 Hr       Glucose Blood (FREESTYLE LITE TEST) In Vitro Strip Tests 1 x daily 100 strip 2    Multiple Vitamins-Minerals (TAB-A-SHLOMO) Oral Tab Take 1 tablet by mouth daily.      atorvastatin 40 MG Oral Tab Take 0.5 tablets (20 mg total) by mouth nightly.  6    atenolol 50 MG Oral Tab Take 0.5 tablets (25 mg total) by mouth  daily.  1    Calcium Carb-Cholecalciferol (CALCIUM + D3) 600-200 MG-UNIT Oral Tab Take 1 tablet by mouth daily.      [] Iron, Ferrous Sulfate, 325 (65 Fe) MG Oral Tab Take 1 tablet by mouth every other day. 30 tablet 0    [] Glucose Blood (FREESTYLE LITE TEST) In Vitro Strip Use as directed. 100 strip 0    ketoconazole 2 % External Cream Apply 1 Application topically 2 (two) times daily.      pioglitazone 15 MG Oral Tab 1 tablet (15 mg total).

## 2025-04-09 VITALS
HEIGHT: 63 IN | DIASTOLIC BLOOD PRESSURE: 52 MMHG | RESPIRATION RATE: 18 BRPM | BODY MASS INDEX: 20.9 KG/M2 | SYSTOLIC BLOOD PRESSURE: 113 MMHG | OXYGEN SATURATION: 97 % | TEMPERATURE: 98 F | HEART RATE: 77 BPM | WEIGHT: 117.94 LBS

## 2025-04-09 LAB
ANION GAP SERPL CALC-SCNC: 9 MMOL/L (ref 0–18)
BASOPHILS # BLD AUTO: 0.03 X10(3) UL (ref 0–0.2)
BASOPHILS NFR BLD AUTO: 0.7 %
BUN BLD-MCNC: 15 MG/DL (ref 9–23)
CALCIUM BLD-MCNC: 9.1 MG/DL (ref 8.7–10.6)
CHLORIDE SERPL-SCNC: 105 MMOL/L (ref 98–112)
CO2 SERPL-SCNC: 24 MMOL/L (ref 21–32)
CREAT BLD-MCNC: 1.24 MG/DL (ref 0.55–1.02)
EGFRCR SERPLBLD CKD-EPI 2021: 45 ML/MIN/1.73M2 (ref 60–?)
EOSINOPHIL # BLD AUTO: 0.28 X10(3) UL (ref 0–0.7)
EOSINOPHIL NFR BLD AUTO: 6.9 %
ERYTHROCYTE [DISTWIDTH] IN BLOOD BY AUTOMATED COUNT: 15.3 %
GLUCOSE BLD-MCNC: 128 MG/DL (ref 70–99)
GLUCOSE BLD-MCNC: 131 MG/DL (ref 70–99)
GLUCOSE BLD-MCNC: 239 MG/DL (ref 70–99)
HCT VFR BLD AUTO: 29.1 % (ref 35–48)
HGB BLD-MCNC: 9.4 G/DL (ref 12–16)
IMM GRANULOCYTES # BLD AUTO: 0.01 X10(3) UL (ref 0–1)
IMM GRANULOCYTES NFR BLD: 0.2 %
LYMPHOCYTES # BLD AUTO: 0.91 X10(3) UL (ref 1–4)
LYMPHOCYTES NFR BLD AUTO: 22.5 %
MAGNESIUM SERPL-MCNC: 1.4 MG/DL (ref 1.6–2.6)
MCH RBC QN AUTO: 26.4 PG (ref 26–34)
MCHC RBC AUTO-ENTMCNC: 32.3 G/DL (ref 31–37)
MCV RBC AUTO: 81.7 FL (ref 80–100)
MONOCYTES # BLD AUTO: 0.53 X10(3) UL (ref 0.1–1)
MONOCYTES NFR BLD AUTO: 13.1 %
NEUTROPHILS # BLD AUTO: 2.28 X10 (3) UL (ref 1.5–7.7)
NEUTROPHILS # BLD AUTO: 2.28 X10(3) UL (ref 1.5–7.7)
NEUTROPHILS NFR BLD AUTO: 56.6 %
OSMOLALITY SERPL CALC.SUM OF ELEC: 288 MOSM/KG (ref 275–295)
PLATELET # BLD AUTO: 143 10(3)UL (ref 150–450)
POTASSIUM SERPL-SCNC: 3.7 MMOL/L (ref 3.5–5.1)
RBC # BLD AUTO: 3.56 X10(6)UL (ref 3.8–5.3)
SODIUM SERPL-SCNC: 138 MMOL/L (ref 136–145)
WBC # BLD AUTO: 4 X10(3) UL (ref 4–11)

## 2025-04-09 PROCEDURE — 99239 HOSP IP/OBS DSCHRG MGMT >30: CPT | Performed by: STUDENT IN AN ORGANIZED HEALTH CARE EDUCATION/TRAINING PROGRAM

## 2025-04-09 RX ORDER — CEPHALEXIN 500 MG/1
500 CAPSULE ORAL 3 TIMES DAILY
Qty: 30 CAPSULE | Refills: 0 | Status: SHIPPED | OUTPATIENT
Start: 2025-04-09 | End: 2025-04-19

## 2025-04-09 NOTE — PROGRESS NOTES
NURSING DISCHARGE NOTE    Discharged Home via Wheelchair.  Accompanied by  self  Belongings Taken by patient/family.    Patient given discharge instructions. Instructed to follow-up with urology in 2-3 weeks for kidney stone removal. Instructed to  prescription medications from pharmacy. Patient verbalized understanding. IV removed and intact. Patient taken to discharge lounge where she will wait for her family to pick her up.     1557: Patient returned to room d/t no staffing in discharge lounge. Patient will wait for her family in her room.     1712: Patient discharged to [facility].

## 2025-04-09 NOTE — PROGRESS NOTES
1900: Patient called stating she is shaking after hydralazine administration at 1835. Patient is c/o headache and states she is unable to control her shaking. Vital checked. B/P still elevated at 182/74. Tylenol given for headache. Dr. Marcelo made aware. Orders received for oral amlodipine.

## 2025-04-09 NOTE — PLAN OF CARE
A&Ox4. VSS. BPs no longer hypertensive overnight upon rechecks. Afebrile. Pt reports headache is gone. Telemetry: NSR RA/ Denies chest pain, shortness of breath GI: Abdomen soft, nondistended. Passing flatus : Voids. Pain controlled with PRN pain medications. Up with standby assist. Diet: 1800 ADA Saline locked All appropriate safety measures in place.

## 2025-04-09 NOTE — PROGRESS NOTES
A&Ox4. RA. .  Telemetry: NSR  GI: Abdomen soft, nondistended. Passing gas and belching.  Denies nausea, pain, SOB, and chest pain.   : Voids-incontinent.   Up with standby assist.  Diet: 2200 calorie restriction/vegetarian diet   Medicated per MAR.   All appropriate safety measures in place. All questions and concerns addressed. Plan of care ongoing.

## 2025-04-09 NOTE — PROGRESS NOTES
INFECTIOUS DISEASE  PROGRESS NOTE            [facility]    Kj Condon Patient Status:  Inpatient    10/15/1945 MRN [id number]   [facility] 3NW-A Attending Galileo Baeza*   Hosp Day # 4 PCP Avinash You MD     Antibiotics: #4  Cefazolin #1    Subjective:  : no fever  Co of frequent urination with lasix, and had a headache     Objective:  Temp:  [97.6 °F (36.4 °C)-99.2 °F (37.3 °C)] 98.1 °F (36.7 °C)  Pulse:  [70-99] 82  Resp:  [14-18] 16  BP: (120-182)/(48-74) 133/49  SpO2:  [95 %-100 %] 95 %    General: awake alert  Vital signs:Temp:  [97.6 °F (36.4 °C)-99.2 °F (37.3 °C)] 98.1 °F (36.7 °C)  Pulse:  [70-99] 82  Resp:  [14-18] 16  BP: (120-182)/(48-74) 133/49  SpO2:  [95 %-100 %] 95 %  HEENT: Moist mucous membranes. Extraocular muscles are intact.  Neck: supple no masses  Respiratory: Non labored, symmetric excursion, normal respirations  Cardiovascular: no irregularities in rhythm  Abdomen: Soft, nontender, nondistended.   Musculoskeletal: joints: no swelling   Integument: No lesions. No erythema. No open wounds.  Labs:     COVID-19 Lab Results    COVID-19  Lab Results   Component Value Date    COVID19 Not Detected 2022    COVID19 Not Detected 2022    COVID19 Not Detected 2021       Pro-Calcitonin  No results for input(s): \"PCT\" in the last 168 hours.    Cardiac  No results for input(s): \"TROP\", \"PBNP\" in the last 168 hours.    Creatinine Kinase  No results for input(s): \"CK\" in the last 168 hours.    Inflammatory Markers  Recent Labs   Lab 25  1406          Recent Labs   Lab 25  0528   RBC 3.56*   HGB 9.4*   HCT 29.1*   MCV 81.7   MCH 26.4   MCHC 32.3   RDW 15.3   NEPRELIM 2.28   WBC 4.0   .0*         Recent Labs   Lab 25  1406 25  0451 25  0506 25  0527 25  0528   * 149*  149* 170* 106*  106* 128*   BUN 22 17  17 20 15  15 15   CREATSERUM 1.86* 1.77*  1.77* 1.38* 1.22*  1.22* 1.24*   CA  10.0 8.7  8.7 8.8 9.0  9.0 9.1   ALB 4.3 3.2  --  3.2  --    * 138  138 139 141  141 138   K 4.2 4.2  4.2 3.4* 4.0  4.0  4.0 3.7   CL 93* 106  106 109 110  110 105   CO2 22.0 24.0  24.0 22.0 22.0  22.0 24.0   ALKPHO 98 54*  --  95  --    AST 31 34*  --  34*  --    ALT 18 15  --  11  --    BILT 2.4* 1.7*  --  0.7  --    TP 7.1 5.4*  --  5.6*  --        No results found for: \"VANCT\"  Microbiology    Hospital Encounter on 04/05/25   1. Urine Culture, Routine     Status: None    Collection Time: 04/05/25  3:56 PM    Specimen: Urine, clean catch   Result Value Ref Range    Urine Culture  N/A     <10,000 cfu/ml Multiple species present- probable contamination.   2. Blood Culture     Status: None (Preliminary result)    Collection Time: 04/05/25  3:09 PM    Specimen: Blood,peripheral   Result Value Ref Range    Blood Culture Result No Growth 3 Days N/A           Problem list reviewed:  Problem List[1]          ASSESSMENT/PLAN:  1. Obstructing ureter stone sp cysto stent  Urine culture in ED showed < 10 K GP organisms (staph and diphteroids), no GNR  -history of pan sensitive E coli in 2023    Since no guidance from micro. PTZ narrowed to Cefazolin  DC planning on PO cephalexin         Jessee Pulido MD, MD  [facility]  [phone]         [1]   Patient Active Problem List  Diagnosis    Senile osteoporosis    Coronary atherosclerosis of native coronary artery    Ulcerative (chronic) enterocolitis (HCC)    Hyperlipidemia    Trigger ring finger of right hand    Stented coronary artery    Type 2 diabetes mellitus without complication, without long-term current use of insulin (HCC)    Pouchitis (HCC)    Pruritus    Iron deficiency anemia due to chronic blood loss    Gallstones    Renal stones    Protein-calorie malnutrition, unspecified severity (HCC)    Other osteoporosis without current pathological fracture    Syncope    Gastritis    Essential hypertension    Neutropenic fever     Septic shock (HCC)    Acute cystitis without hematuria    Hematemesis with nausea    Ulcerative colitis with complication (HCC)    Crohn's disease with complication, unspecified gastrointestinal tract location (HCC)    Hyponatremia    Acute renal failure superimposed on chronic kidney disease, unspecified acute renal failure type, unspecified CKD stage    Kidney stone    Calculus of gallbladder without cholecystitis without obstruction

## 2025-04-09 NOTE — DISCHARGE SUMMARY
[facility]     Kj Condon Patient Status:  Inpatient    10/15/1945 MRN [id number]   Location [facility] 3NW-A Attending No att. providers found   Hosp Day # 4 PCP Avinash You MD     Date of Admission: 2025  Date of Discharge:  2025     Discharge Disposition: Home or Self Care    Discharge Diagnosis:  #Septic shock   #Complicated UTI with obstructing stone  Presented with near syncope, nausea, vomiting. CT with obstructing 5 mm calculus at L distal ureter with mild hydro. Left perinephric fat stranding.   -Off pressors since   -IV zosyn ( - ); dc vancomycin   -ID consult for antibiotic plan at discharge      #SOB, cough  -Lasix 20 mg x 1 dose     #Hematemesis - resolved  #Ulcerative colitis   -Patient is on Entyvio as outpatient.GI evaluated and likely 2/2 n/v. Hgb stable so GI signed off.      #Costochondritis - lidocaine patch, prn tylenol      #Left obstructing stone with mild upstream hydroureteronephrosis s/p cystoscopy and stent placement      #Near syncope - likely 2/2 hypotension on presentation, CTM with IVF     #Hypomagnesemia - supplement per protocol      #Hyponatremia   #Normocytic anemia   #Thrombocytopenia 2/2 sepsis   #VIC/CKD, likely hypovolemic - improving  # CAD - holding ASA; continue statin   # HTN - hold home BP meds while hypotensive   # DM2 - LDSSI; Last A1c value was 7.7% done 2024.  # Hyperlipidemia - continue home statin    History of Present Illness: Kj Condon is a 79 year old female with PMHx CAD, DM2, Ulcerative colitis, HLD, who presents for near syncope.      Patient with  at bedside.  Patient noted to have onset of suprapubic and left lower quadrant abdominal pain starting yesterday.  Pain kidney acute progressively worse and was having associated nausea and vomiting.  Patient also noted to be febrile at home to 100.2 with associated chills and rigors.  Denies associated chest pain, cough, congestion, shortness of  breath.  Patient Entyvio.  Denies any associated hematochezia or melena, denies diarrhea.  On arrival to ER patient noted to have copious hematemesis and coffee-ground emesis on her gown.    Brief Synopsis: CT abdomen pelvis showed obstructing 5 mm calculus at left distal ureter with mild hydronephrosis.  Patient was started on IV antibiotics.  She was given pressors for septic shock.  Urology was consulted.  Patient underwent cystoscopy and stent placement on 4/5.  Patient was weaned off of pressors.  GI was consulted for ulcerative colitis history and hematemesis.  Hematemesis resolved.  GI signed off.  ID was consulted for antibiotic plan at discharge.    Lace+ Score: 54  59-90 High Risk  29-58 Medium Risk  0-28   Low Risk       TCM Follow-Up Recommendation:  LACE 29-58: Moderate Risk of readmission after discharge from the hospital.      Procedures during hospitalization:   Cystoscopy and stent placement    Incidental or significant findings and recommendations (brief descriptions):  See brief synopsis above    Lab/Test results pending at Discharge:   None    Consultants:  Urology  GI  ID  Critical care    Discharge Medication List:     Discharge Medications        START taking these medications        Instructions Prescription details   cephALEXin 500 MG Caps  Commonly known as: Keflex      Take 1 capsule (500 mg total) by mouth 3 (three) times daily for 10 days.   Stop taking on: April 19, 2025  Quantity: 30 capsule  Refills: 0            CHANGE how you take these medications        Instructions Prescription details   ferrous sulfate 325 (65 FE) MG Tbec  What changed: Another medication with the same name was removed. Continue taking this medication, and follow the directions you see here.      Take 1 tablet (325 mg total) by mouth 2 (two) times daily with meals.   Quantity: 60 tablet  Refills: 0     FreeStyle Lite Test Strp  What changed: Another medication with the same name was removed. Continue taking this  medication, and follow the directions you see here.      Tests 1 x daily   Quantity: 100 strip  Refills: 2     pantoprazole 40 MG Tbec  Commonly known as: Protonix  What changed: when to take this      Take 1 tablet (40 mg total) by mouth 2 (two) times daily before meals. Before meal   Quantity: 60 tablet  Refills: 1            CONTINUE taking these medications        Instructions Prescription details   aspirin 81 MG Tbec  Commonly known as: Aspirin EC Low Dose      Take 1 tablet (81 mg total) by mouth daily.   Quantity: 90 tablet  Refills: 3     atenolol 50 MG Tabs  Commonly known as: Tenormin      Take 0.5 tablets (25 mg total) by mouth daily.   Refills: 1     atorvastatin 40 MG Tabs  Commonly known as: Lipitor      Take 0.5 tablets (20 mg total) by mouth nightly.   Refills: 6     Calcium + D3 600-200 MG-UNIT Tabs      Take 1 tablet by mouth daily.   Refills: 0     glipiZIDE ER 5 MG Tb24  Commonly known as: Glucotrol XL       Refills: 0     OneTouch Delica Plus Pycryh86U Misc      1 strip by In Vitro route daily.   Refills: 0     OneTouch Verio Reflect w/Device Kit      Take 1 Bottle by mouth As Directed.   Refills: 0     pioglitazone 15 MG Tabs  Commonly known as: Actos      1 tablet (15 mg total).   Refills: 0     Tab-A-Vargas Tabs      Take 1 tablet by mouth daily.   Refills: 0     triamcinolone 0.1 % Crea  Commonly known as: Kenalog      Apply topically 2 (two) times daily.   Refills: 0     zolpidem 10 MG Tabs  Commonly known as: Ambien      Take 1 tablet (10 mg total) by mouth nightly as needed.   Refills: 0            STOP taking these medications      ketoconazole 2 % Crea  Commonly known as: Nizoral                  Where to Get Your Medications        These medications were sent to [facility] [address] [place] [address] - [address] [facility] [phone], [phone]  [address] [facility] [address]      Phone: [phone]   cephALEXin 500 MG Caps  pantoprazole 40 MG Tbec          [facility] reviewed: Yes    Follow-up appointment:   Avinash You MD  [address] [facility]  [address]  [facility] [address] [phone]    Follow up in 1 week(s)      Galileo Baeza MD  [address] [facility] [address]  [facility] [id number] [phone]    Follow up in 2 week(s)      Appointments for Next 30 Days 4/10/2025 - 5/10/2025        Date Arrival Time Visit Type Length Department Provider     2025 11:45 AM  FOLLOW UP-HEM/ONC [2051] 15 min [facility] On[facility] [place] Sunshine Miranda MD    Patient Instructions:         Location Instructions:     **IF YOU NEED LABWORK OR AN INFUSION ALONG WITH YOUR APPOINTMENT, YOU MUST CALL TO SCHEDULE.**  Your appointment is on the [facility] campus in the Cancer Center. The address is [address] [place] [address]. Please register at the Cancer Center  on the second floor.  Masks are optional for all patients and visitors, unless otherwise indicated.                      Vital signs:       Physical Exam:    General: No acute distress   Lungs: clear to auscultation  Cardiovascular: S1, S2  Abdomen: Soft      -----------------------------------------------------------------------------------------------  PATIENT DISCHARGE INSTRUCTIONS: See electronic chart    Jean-Paul Marcelo DO    Total time spent on discharge plannin minutes     The 21st Century Cures Act makes medical notes like these available to patients in the interest of transparency. Please be advised this is a medical document. Medical documents are intended to carry relevant information, facts as evident, and the clinical opinion of the practitioner. The medical note is intended as peer to peer communication and may appear blunt or direct. It is written in medical language and may contain abbreviations or verbiage that are unfamiliar.

## 2025-04-10 ENCOUNTER — TELEPHONE (OUTPATIENT)
Dept: INTERNAL MEDICINE CLINIC | Facility: CLINIC | Age: 80
End: 2025-04-10

## 2025-04-10 ENCOUNTER — PATIENT OUTREACH (OUTPATIENT)
Dept: CASE MANAGEMENT | Age: 80
End: 2025-04-10

## 2025-04-10 NOTE — TELEPHONE ENCOUNTER
Spoke with patient. Patient was concerned that her hemoglobin was 6.6 and she was discharged from the hospital. Upon further review, patient's hemoglobin was 9.4 and her hemoglobin A1c was 6.6. Pt is a known diabetic. This RN provided education on that the hemoglobin A1c tests pt's average 3 month blood sugar. The hospital wouldn't discharge patient if her hemoglobin were really that low. Pt v/u. No further questions/concerns.

## 2025-04-11 NOTE — PROGRESS NOTES
Hospital Follow up for TCC (Discharge 4/9 edw)     PCP  Avinash You  [facility]  [address] [address] [phone]  Pt declined to make follow up appt     Confirmed with pt   Closing encounter

## 2025-04-16 ENCOUNTER — APPOINTMENT (OUTPATIENT)
Age: 80
End: 2025-04-16
Attending: INTERNAL MEDICINE
Payer: MEDICARE

## 2025-04-22 NOTE — PROGRESS NOTES
[facility] Note    Visit Diagnosis:  1. Chronic ulcerative enterocolitis with complication (HCC)    2. Iron deficiency anemia due to chronic blood loss        History of Present Illness: 78F with a PMH of CAD, DVT, DM2, Osteoporosis Ulcerative colitis and pouchitis was referred by Dr. Chaparro for anemia.    -12/2020: She started to notice new weakness and diffuse itching. She met with a dermatologist and was noted to have new onset anemia. She states that she was noticing intermittent a mild hematochezia.     -3/4/21 to 3/6/21: She was admitted for symptomatic anemia. Her Hb trended down from 07/2020 (Hb 11.8) to 7.4 (03/03/21).  Her MCV was previously normal and is Now < 80. Retic Hb 18. Ferritin 7.3,  Fe sat 3. She also noticed 10 lb weight loss. She underwent and EGD/Flex sig--EGD essentially normal aside from a 3 cm hiatal hernia (non-bleeding), few antral erosions, Flex sig showed superficial ulcerations in J pouch--there was no obvious bleeding. Path did not show cancer. There was evidence of acute inflammation in ileum, anastomosis, J pouch and transition zone. During her admission, she was transfused 1 unit.     -3/11/21: I met with patient for first time and we discussed above. She notes that the most bothersome thing to her his diffuse itching. She has also noticed 8-10 lb weight loss in the past year which has been unintentional. She has noted intermittent hematochezia. No abdominal vaginal bleeding or hematuria. She denies fevers, chills or night sweats. No melena. Denies any pain. No smoking/etoh abuse/drugs. Used to work for [employer]. Son is anesthesiologist. In regards to her UC, she is not currently on any biologics, she was considering Entyvio. She is currently on PRN Mesalamine suppositories but these cause constipation. She does not take PO iron regularly.     -IV InFED 03/2021    -IV InFED 05/2024    -IV InFED 04/2025, B12      Interval History: above  -Labs from March  : Ferritin 57, Fe sat 13, B12 286, Folate 15, HB 9.4, Plt 143  -Here for entivyo   -She is taking oral iron  -Skin occasionally turns darker with iron  -Feels fatigued       Review of Systems: 12 Point ROS was completed and pertinent positives are in the HPI    Current Outpatient Medications on File Prior to Visit   Medication Sig Dispense Refill    Vitamin C 500 MG Oral Tab Take 1 tablet (500 mg total) by mouth daily.      pantoprazole 40 MG Oral Tab EC Take 1 tablet (40 mg total) by mouth 2 (two) times daily before meals. Before meal 60 tablet 1    ferrous sulfate 325 (65 FE) MG Oral Tab EC Take 1 tablet (325 mg total) by mouth 2 (two) times daily with meals. (Patient taking differently: Take 1 tablet (325 mg total) by mouth daily with breakfast.) 60 tablet 0    aspirin (ASPIRIN EC LOW DOSE) 81 MG Oral Tab EC Take 1 tablet (81 mg total) by mouth daily. 90 tablet 3    Blood Glucose Monitoring Suppl (ONETOUCH VERIO REFLECT) w/Device Does not apply Kit Take 1 Bottle by mouth As Directed.      Lancets (ONETOUCH DELICA PLUS [id number]) Does not apply Misc 1 strip by In Vitro route in the morning.      zolpidem 10 MG Oral Tab Take 1 tablet (10 mg total) by mouth nightly as needed.      triamcinolone 0.1 % External Cream Apply topically in the morning and before bedtime.      glipiZIDE ER 5 MG Oral Tablet 24 Hr       pioglitazone 15 MG Oral Tab 1 tablet (15 mg total).      Glucose Blood (FREESTYLE LITE TEST) In Vitro Strip Tests 1 x daily 100 strip 2    Multiple Vitamins-Minerals (TAB-A-SHLOMO) Oral Tab Take 1 tablet by mouth in the morning.      atorvastatin 40 MG Oral Tab Take 0.5 tablets (20 mg total) by mouth nightly.  6    atenolol 50 MG Oral Tab Take 0.5 tablets (25 mg total) by mouth in the morning.  1    Calcium Carb-Cholecalciferol (CALCIUM + D3) 600-200 MG-UNIT Oral Tab Take 1 tablet by mouth in the morning.      [] cephALEXin 500 MG Oral Cap Take 1 capsule (500 mg total) by mouth 3 (three) times daily  for 10 days. 30 capsule 0     Current Facility-Administered Medications on File Prior to Visit   Medication Dose Route Frequency Provider Last Rate Last Admin    vedolizumab (Entyvio) 300 mg in sodium chloride 0.9% 255 mL IVPB  300 mg Intravenous Once Reese Sanchez MD        iron dextran (Infed) 1,000 mg in sodium chloride 0.9% 270 mL IVPB  1,000 mg Intravenous Once Sunshine Miranda MD        cyanocobalamin (Vitamin B12) 1000 MCG/ML injection 1,000 mcg  1,000 mcg Intramuscular Once Sunshine Miranda MD        [COMPLETED] magnesium oxide (Mag-Ox) tab 800 mg  800 mg Oral Once Mady Amador MD   800 mg at 04/08/25 0906    [COMPLETED] furosemide (Lasix) 10 mg/mL injection 20 mg  20 mg Intravenous Once Mady Amador MD   20 mg at 04/08/25 1215    [COMPLETED] magnesium oxide (Mag-Ox) tab 800 mg  800 mg Oral Once Mady Amador MD   800 mg at 04/07/25 0854    [COMPLETED] potassium chloride (Klor-Con M20) tab 40 mEq  40 mEq Oral Once Mady Amador MD   40 mEq at 04/07/25 0854    [COMPLETED] magnesium oxide (Mag-Ox) tab 400 mg  400 mg Oral Once Marian Mitchell MD   400 mg at 04/06/25 1032    [COMPLETED] sodium chloride 0.9 % IV bolus 1,000 mL  1,000 mL Intravenous Once Josie Dawson MD   Stopped at 04/05/25 1514    [COMPLETED] pantoprazole (Protonix) 40 mg in sodium chloride 0.9% PF 10 mL IV push  40 mg Intravenous Once Josie Dawson MD   40 mg at 04/05/25 1423    [COMPLETED] sodium chloride 0.9 % IV bolus 1,566 mL  30 mL/kg Intravenous Once Josie Dawson MD   Stopped at 04/05/25 1634    [COMPLETED] piperacillin-tazobactam (Zosyn) 4.5 g in dextrose 5% 100 mL IVPB-ADDV  4.5 g Intravenous Once Josie Dawson MD   Stopped at 04/05/25 1634    [COMPLETED] iopamidol 76% (ISOVUE-370) injection for power injector  80 mL Intravenous ONCE PRN Josie Dawson MD   80 mL at 04/05/25 1807    [COMPLETED] vancomycin (Vancocin) 1.25 g in sodium chloride 0.9% 250mL IVPB premix  25 mg/kg Intravenous Once Felisha,  Galileo Yao .7 mL/hr at 04/05/25 2134 1,250 mg at 04/05/25 2134    [COMPLETED] lactated ringers IV bolus 500 mL  500 mL Intravenous Once Eva Gamble APRN 500 mL/hr at 04/05/25 2353 500 mL at 04/05/25 2353    vedolizumab (Entyvio) 300 mg in sodium chloride 0.9% 254.8 mL IVPB  300 mg Intravenous Once Reese Sanchez MD         Past Medical History:    Blood in the stool    CAD (coronary artery disease)    Dr. Montero     Calculus of kidney    Colitis    Constipation    Coronary atherosclerosis of native coronary artery    Diabetes (HCC)    Displacement of lumbar intervertebral disc without myelopathy    Dizziness    DVT (deep venous thrombosis) (HCC)    Frequent use of laxatives    Gall stones    Hemorrhoids    High cholesterol    Hip fracture (HCC)    History of blood transfusion    Irregular bowel habits    Kidney stones    Lumbar disc disease    Osteoporosis    Senile osteoporosis    Shortness of breath    Stented coronary artery    Stool incontinence    Stress fracture, left fibula, initial encounter for fracture    Weight loss     Past Surgical History:   Procedure Laterality Date    Angioplasty (coronary)      Colectomy      Egd      Fracture surgery Right     ankle    Lithotripsy      Lmbr epidural steroid inj, physiatry (internal)      Sigmoidoscopy,diagnostic       Social History     Socioeconomic History    Marital status:    Tobacco Use    Smoking status: Never    Smokeless tobacco: Never   Vaping Use    Vaping status: Never Used   Substance and Sexual Activity    Alcohol use: No     Alcohol/week: 0.0 standard drinks of alcohol    Drug use: No    Sexual activity: Never      Family History   Problem Relation Age of Onset    Heart Surgery Father         CABG    Diabetes Mother         Mellitus        Physical Exam  Height: 157.5 cm (5' 2.01\") (04/23 1031)  Weight: 52.3 kg (115 lb 6.4 oz) (04/23 1031)  BSA (Calculated - sq m): 1.51 sq meters (04/23 1031)  Pulse: 76 (04/23 1031)  BP: 151/66  (04/23 1031)  Temp: 97.6 °F (36.4 °C) (04/23 1031)  Do Not Use - Resp Rate: --  SpO2: 100 % (04/23 1031)     General: NAD, AOX3  HEENT: clear op, mmm, no jvd, no scleral icterus  CV: RR  Extremities: No edema   Lungs: no increased work of breathing  Abd: non distended   Neuro: CN: II-XII grossly intact      Results:  Lab Results   Component Value Date    WBC 4.0 04/09/2025    HGB 9.4 (L) 04/09/2025    HCT 29.1 (L) 04/09/2025    MCV 81.7 04/09/2025    .0 (L) 04/09/2025     Lab Results   Component Value Date     04/09/2025    K 3.7 04/09/2025    CO2 24.0 04/09/2025     04/09/2025    BUN 15 04/09/2025    PHOS 3.2 04/06/2025    ALB 3.2 04/08/2025       Radiology: Reviewed     Pathology:   3/5/21: EGD/Colon  A.  Duodenal biopsy:  -Fragments of small intestinal mucosa with no significant pathologic change.   -Negative for morphologic changes of celiac sprue.      B.  Gastric biopsy:  -Reactive gastropathy/erosive gastritis.  -Negative for Helicobacter pylori-like organisms     C.  Distal esophagus biopsy:  Fragments of junctional mucosa with mild reflux-like changes including chronic gastric carditis.  -Negative for intestinal metaplasia.     D.  Ileal biopsy:  -Patchy mild nonspecific lamina propria acute inflammation.     E.  Anastomosis biopsy:  -Fragments of small intestinal type mucosa with mild acute and chronic inflammation.     F.  J-pouch biopsy:  -Fragments of ileal mucosa with acute and chronic inflammation including cryptitis and gland architectural distortion consistent with pouchitis.     G.  Transition zone biopsy:  -Fragments of intestinal mucosa with ulceration with acute inflammatory exudate and granulation tissue formation        Assessment and Plan: 79F with a PMH of CAD, DVT, DM2, Osteoporosis Ulcerative colitis and pouchitis was referred by Dr. Chaparro for anemia.    Iron Deficiency Anemia:   - Likely source of anemia is chronic GI Blood loss from active UC given active  inflammation. She has follow up with GI  - Recommend IV InFED. Risk and benefits discussed   - Ok to hold off on PO iron given issues with constipation   - We discussed that we offer PRBC transfusion for a Hb < 7    B12 deficiency-IM B12 q8 weeks     Dispo  IV InFED x 1  IM B12 q8 weeks   Repeat labs every 8 weeks when she comes in for enyvio     RTC q12 months    [facility]

## 2025-04-23 ENCOUNTER — OFFICE VISIT (OUTPATIENT)
Age: 80
End: 2025-04-23
Attending: INTERNAL MEDICINE
Payer: MEDICARE

## 2025-04-23 VITALS
OXYGEN SATURATION: 100 % | HEIGHT: 62.01 IN | TEMPERATURE: 98 F | BODY MASS INDEX: 20.97 KG/M2 | SYSTOLIC BLOOD PRESSURE: 151 MMHG | WEIGHT: 115.38 LBS | DIASTOLIC BLOOD PRESSURE: 66 MMHG | RESPIRATION RATE: 18 BRPM | HEART RATE: 76 BPM

## 2025-04-23 DIAGNOSIS — D50.0 IRON DEFICIENCY ANEMIA DUE TO CHRONIC BLOOD LOSS: ICD-10-CM

## 2025-04-23 DIAGNOSIS — K91.850 POUCHITIS (HCC): Primary | ICD-10-CM

## 2025-04-23 DIAGNOSIS — K51.019 CHRONIC ULCERATIVE ENTEROCOLITIS WITH COMPLICATION (HCC): Primary | ICD-10-CM

## 2025-04-23 RX ORDER — ASCORBIC ACID 500 MG
500 TABLET ORAL DAILY
COMMUNITY

## 2025-04-23 RX ORDER — CYANOCOBALAMIN 1000 UG/ML
1000 INJECTION, SOLUTION INTRAMUSCULAR; SUBCUTANEOUS ONCE
Status: CANCELLED | OUTPATIENT
Start: 2025-04-23

## 2025-04-23 RX ORDER — CYANOCOBALAMIN 1000 UG/ML
1000 INJECTION, SOLUTION INTRAMUSCULAR; SUBCUTANEOUS ONCE
Status: COMPLETED | OUTPATIENT
Start: 2025-04-23 | End: 2025-04-23

## 2025-04-23 RX ORDER — CYANOCOBALAMIN 1000 UG/ML
1000 INJECTION, SOLUTION INTRAMUSCULAR; SUBCUTANEOUS ONCE
OUTPATIENT
Start: 2025-06-18

## 2025-04-23 RX ADMIN — CYANOCOBALAMIN 1000 MCG: 1000 INJECTION, SOLUTION INTRAMUSCULAR; SUBCUTANEOUS at 11:23:00

## 2025-04-23 NOTE — PROGRESS NOTES
Patient here for follow-up. Recently in hospital for kidney stone. Continues on oral iron daily . Energy levels are low. Denies any current dyspnea or lightheadedness. Denies any visible signs of  bleeding.

## 2025-04-23 NOTE — PROGRESS NOTES
Education Record    Learner:  Patient    Disease / Diagnosis:Chronic ulcerative enterocolitis with complication +Iron deficiency anemia due to chronic blood loss     Barriers / Limitations:  None   Comments:    Method:  Brief focused   Comments:    General Topics:  Diet, Infection, Medication, Pain, Precautions, Procedure, Side effects and symptom management, Plan of care reviewed, and Fall risk and prevention   Comments:    Outcome:  Shows understanding   Comments:    Patient received Entivio infusion + Infed + B12 injection   
5

## 2025-04-24 LAB
ANTIBODY SCREEN: NEGATIVE
RH BLOOD TYPE: POSITIVE

## 2025-05-01 ENCOUNTER — OFFICE VISIT (OUTPATIENT)
Dept: INTERNAL MEDICINE CLINIC | Facility: CLINIC | Age: 80
End: 2025-05-01
Payer: MEDICARE

## 2025-05-01 ENCOUNTER — TELEPHONE (OUTPATIENT)
Dept: INTERNAL MEDICINE CLINIC | Facility: CLINIC | Age: 80
End: 2025-05-01

## 2025-05-01 VITALS
HEIGHT: 62.01 IN | HEART RATE: 90 BPM | WEIGHT: 114 LBS | DIASTOLIC BLOOD PRESSURE: 56 MMHG | OXYGEN SATURATION: 90 % | SYSTOLIC BLOOD PRESSURE: 102 MMHG | BODY MASS INDEX: 20.71 KG/M2 | TEMPERATURE: 97 F

## 2025-05-01 DIAGNOSIS — E78.2 DYSLIPIDEMIA WITH LOW HIGH DENSITY LIPOPROTEIN (HDL) CHOLESTEROL WITH HYPERTRIGLYCERIDEMIA DUE TO TYPE 2 DIABETES MELLITUS (HCC): ICD-10-CM

## 2025-05-01 DIAGNOSIS — Z01.818 PRE-OP EXAM: Primary | ICD-10-CM

## 2025-05-01 DIAGNOSIS — E11.69 DYSLIPIDEMIA WITH LOW HIGH DENSITY LIPOPROTEIN (HDL) CHOLESTEROL WITH HYPERTRIGLYCERIDEMIA DUE TO TYPE 2 DIABETES MELLITUS (HCC): ICD-10-CM

## 2025-05-01 PROCEDURE — 99215 OFFICE O/P EST HI 40 MIN: CPT | Performed by: INTERNAL MEDICINE

## 2025-05-01 RX ORDER — DENOSUMAB 60 MG/ML
60 INJECTION SUBCUTANEOUS
COMMUNITY
Start: 2023-01-16

## 2025-05-01 NOTE — PROGRESS NOTES
Kj Condon is a 79 year old female who presents for a pre-operative physical exam.   Kj Condon is scheduled for a left sided lithotripsy procedure at [facility] on 12-th May 2025 performed by Dr Bal. Indication: Left sided lithotripsy    HPI related to surgery:     The patient is 79-year-old female with history of type 2 diabetes mellitus, ulcerative colitis-status post colectomy and on Biologics, left-sided ureteric stone, CKD 3A had obstructive left ureteric calculus leading to sepsis few months ago.  The patient needed emergent decompression and placement of stent in the left ureter.  There is upcoming lithotripsy for the stone in a few weeks time.    Currently, the patient's diabetes is well-controlled.  Her blood pressure is normal.  Her kidney functions are stable over the last 2 years.  In fact, in the last few months,  creatinine taken on 4/9/2025 is the best.      She  has had previous anesthesia:  Yes.  Previous complications:  No    Short Social Hx on File[1]   Past Medical History[2]  Past Surgical History[3]  Allergies: Allergies[4]  Current Medications[5]     REVIEW OF SYSTEMS:     No shortness of breath.  Able to climb 2 flight of stairs.  METS more than 4.  PHYSICAL EXAM:   /56 (BP Location: Left arm, Patient Position: Sitting, Cuff Size: adult)   Pulse 90   Temp 97.1 °F (36.2 °C) (Temporal)   Ht 5' 2.01\" (1.575 m)   Wt 114 lb (51.7 kg)   SpO2 90%   BMI 20.84 kg/m²    General condition: Not in distress.    HEENT: Atraumatic normocephalic  No cervical or submandibular lymphadenopathy  Chest: Clear to auscultate  Heart: S1-S2 ,no murmur  Abdomen: Soft non tender, no palpable organomegaly  Midline surgical scars present.  Neurological examination: No facial asymmetry.  No lateralizing neurological signs.  Mental state examination: Good eye to eye contact.  Normal mood and behavior.  Engaged in meaningful conversations.  Extremities:  Normal upper extremities  Legs: No  edema      LABORATORY DATA:   EKG done 3 weeks ago revealed normal sinus rhythm  4/9/ 2025-CMP is normal except for creatinine of 1.24 and blood sugar of 128.  She had magnesium level of 1.4.  And hemoglobin was 9.4.    ASSESSMENT AND PLAN:   Kj Condon has no significant history of cardiac or pulmonary conditions.   She is a good surgical candidate. This consult was sent back the referring physician, Dr. CLAROS.    Assessment:  #1: Diabetes mellitus-well-controlled  #2: CKD 3A-well-controlled  #3: Chronic blood loss anemia with recurrent iron transfusions.  Recent hemoglobin is 9.4 which is well-controlled.  There is no active bleeding.  #4: Ulcerative colitis is well-controlled with Biologics-vedolizumab        Plan     #1: I will check magnesium levels.  #2: After correction of electrolytes including magnesium, the patient can undergo lithotripsy.        Avinash You MD           [1]   Social History  Socioeconomic History    Marital status:    Tobacco Use    Smoking status: Never    Smokeless tobacco: Never   Vaping Use    Vaping status: Never Used   Substance and Sexual Activity    Alcohol use: No     Alcohol/week: 0.0 standard drinks of alcohol    Drug use: No    Sexual activity: Never   Other Topics Concern    Caffeine Concern Yes     Comment: 1 cup tea once per day     Exercise Yes     Comment: x7 days per week    Social History Narrative    The patient does not use an assistive device..      The patient does live in a home with stairs.     Social Drivers of Health     Food Insecurity: No Food Insecurity (4/6/2025)    NCSS - Food Insecurity     Worried About Running Out of Food in the Last Year: No     Ran Out of Food in the Last Year: No   Transportation Needs: No Transportation Needs (4/6/2025)    NCSS - Transportation     Lack of Transportation: No   Housing Stability: Not At Risk (4/6/2025)    NCSS - Housing/Utilities     Has Housing: Yes     Worried About Losing Housing: No     Unable to Get  Utilities: No   [2]   Past Medical History:   Blood in the stool    CAD (coronary artery disease)    Dr. Montero     Calculus of kidney    Colitis    Constipation    Coronary atherosclerosis of native coronary artery    Diabetes (HCC)    Displacement of lumbar intervertebral disc without myelopathy    Dizziness    DVT (deep venous thrombosis) (HCC)    Frequent use of laxatives    Gall stones    Hemorrhoids    High cholesterol    Hip fracture (HCC)    History of blood transfusion    Irregular bowel habits    Kidney stones    Lumbar disc disease    Osteoporosis    Senile osteoporosis    Shortness of breath    Stented coronary artery    Stool incontinence    Stress fracture, left fibula, initial encounter for fracture    Weight loss   [3]   Past Surgical History:  Procedure Laterality Date    Angioplasty (coronary)      Colectomy      Egd      Fracture surgery Right     ankle    Lithotripsy      Lmbr epidural steroid inj, physiatry (internal)      Sigmoidoscopy,diagnostic     [4] No Known Allergies  [5]   Current Outpatient Medications   Medication Sig Dispense Refill    Vitamin C 500 MG Oral Tab Take 1 tablet (500 mg total) by mouth daily.      aspirin (ASPIRIN EC LOW DOSE) 81 MG Oral Tab EC Take 1 tablet (81 mg total) by mouth daily. (Patient taking differently: Take 1 tablet (81 mg total) by mouth in the morning. Every other day or 2-3x a week.) 90 tablet 3    Blood Glucose Monitoring Suppl (ONETOUCH VERIO REFLECT) w/Device Does not apply Kit Take 1 Bottle by mouth As Directed.      Lancets (ONETOUCH DELICA PLUS [id number]) Does not apply Misc 1 strip by In Vitro route in the morning.      zolpidem 10 MG Oral Tab Take 1 tablet (10 mg total) by mouth nightly as needed.      triamcinolone 0.1 % External Cream Apply topically in the morning and before bedtime. (Patient taking differently: Apply topically as needed.)      glipiZIDE ER 5 MG Oral Tablet 24 Hr       Glucose Blood (FREESTYLE LITE TEST) In Vitro Strip Tests 1 x  daily 100 strip 2    Multiple Vitamins-Minerals (TAB-A-SHLOMO) Oral Tab Take 1 tablet by mouth in the morning.      atorvastatin 40 MG Oral Tab Take 0.5 tablets (20 mg total) by mouth nightly.  6    atenolol 50 MG Oral Tab Take 0.5 tablets (25 mg total) by mouth in the morning.  1    Calcium Carb-Cholecalciferol (CALCIUM + D3) 600-200 MG-UNIT Oral Tab Take 1 tablet by mouth in the morning.      pantoprazole 40 MG Oral Tab EC Take 1 tablet (40 mg total) by mouth 2 (two) times daily before meals. Before meal (Patient not taking: Reported on 5/1/2025) 60 tablet 1    ferrous sulfate 325 (65 FE) MG Oral Tab EC Take 1 tablet (325 mg total) by mouth 2 (two) times daily with meals. (Patient not taking: Reported on 5/1/2025) 60 tablet 0    pioglitazone 15 MG Oral Tab 1 tablet (15 mg total). (Patient not taking: Reported on 5/1/2025)

## 2025-05-01 NOTE — TELEPHONE ENCOUNTER
Patient called back and changed her mind   Spoke with TJ   Scheduled for today at 1:20   Patient to arrive at 1

## 2025-05-01 NOTE — PATIENT INSTRUCTIONS
In patients with ureteral stones >5 mm and <=10 mm in diameter, treatment with the alpha blocker tamsulosin (0.4 mg once daily) for up to four weeks to facilitate spontaneous stone passage.     Other options are - terazosin, doxazosin, alfuzosin, or silodosin)

## 2025-05-01 NOTE — TELEPHONE ENCOUNTER
Pt is wanting to check for later times for appt today, but informed there are none for TJ or any other provider. Pt has to cancel her appt today due to eye doctor appt, this has been rescheduled to 5/6 per pt request.    Future Appointments   Date Time Provider Department Center   5/6/2025 11:00 AM Avinash You MD [facility] 8 [facility] [place]   6/18/2025  1:00 PM NP TX RN3 NP  [facility] [place] [facility]   7/23/2025 10:10 AM NP OOT NP  [facility] [place] [facility]

## 2025-05-03 ENCOUNTER — HOSPITAL ENCOUNTER (OUTPATIENT)
Dept: LAB | Facility: HOSPITAL | Age: 80
Discharge: HOME | End: 2025-05-03
Attending: INTERNAL MEDICINE
Payer: MEDICARE

## 2025-05-03 DIAGNOSIS — Z01.818 PRE-OP EXAM: ICD-10-CM

## 2025-05-08 ENCOUNTER — TELEPHONE (OUTPATIENT)
Dept: INTERNAL MEDICINE CLINIC | Facility: CLINIC | Age: 80
End: 2025-05-08

## 2025-05-08 NOTE — TELEPHONE ENCOUNTER
Patient questioning if she still needs A1C lab completed for upcoming procedure? Please advise.   Patient at 2+ station and having recurrent late decels/prolonged decels with every push/contraction. Decision made to use vacuum 2/2 Cat II tracing. Mity vac placed, 2 pulls, no pop off, head, shoulders, body delivered.

## 2025-05-17 ENCOUNTER — APPOINTMENT (OUTPATIENT)
Dept: CT IMAGING | Facility: HOSPITAL | Age: 80
End: 2025-05-17
Attending: EMERGENCY MEDICINE
Payer: MEDICARE

## 2025-05-17 ENCOUNTER — HOSPITAL ENCOUNTER (EMERGENCY)
Facility: HOSPITAL | Age: 80
Discharge: HOME | End: 2025-05-17
Attending: EMERGENCY MEDICINE
Payer: MEDICARE

## 2025-05-17 VITALS
BODY MASS INDEX: 21.16 KG/M2 | RESPIRATION RATE: 18 BRPM | HEART RATE: 68 BPM | DIASTOLIC BLOOD PRESSURE: 66 MMHG | OXYGEN SATURATION: 100 % | TEMPERATURE: 97 F | HEIGHT: 62 IN | WEIGHT: 115 LBS | SYSTOLIC BLOOD PRESSURE: 157 MMHG

## 2025-05-17 DIAGNOSIS — Z96.0 URETERAL STENT PRESENT: ICD-10-CM

## 2025-05-17 DIAGNOSIS — R03.0 ELEVATED BLOOD PRESSURE READING: ICD-10-CM

## 2025-05-17 DIAGNOSIS — N30.01 ACUTE CYSTITIS WITH HEMATURIA: Primary | ICD-10-CM

## 2025-05-17 LAB
ALBUMIN SERPL-MCNC: 4.4 G/DL (ref 3.2–4.8)
ALBUMIN/GLOB SERPL: 1.3 {RATIO} (ref 1–2)
ALP LIVER SERPL-CCNC: 74 U/L (ref 55–142)
ALT SERPL-CCNC: 18 U/L (ref 10–49)
ANION GAP SERPL CALC-SCNC: 5 MMOL/L (ref 0–18)
AST SERPL-CCNC: 27 U/L (ref ?–34)
BASOPHILS # BLD AUTO: 0.04 X10(3) UL (ref 0–0.2)
BASOPHILS NFR BLD AUTO: 0.7 %
BILIRUB SERPL-MCNC: 1.1 MG/DL (ref 0.2–1.1)
BILIRUB UR QL STRIP.AUTO: NEGATIVE
BUN BLD-MCNC: 17 MG/DL (ref 9–23)
CALCIUM BLD-MCNC: 9.6 MG/DL (ref 8.7–10.6)
CHLORIDE SERPL-SCNC: 104 MMOL/L (ref 98–112)
CO2 SERPL-SCNC: 27 MMOL/L (ref 21–32)
CREAT BLD-MCNC: 1.2 MG/DL (ref 0.55–1.02)
EGFRCR SERPLBLD CKD-EPI 2021: 46 ML/MIN/1.73M2 (ref 60–?)
EOSINOPHIL # BLD AUTO: 0.34 X10(3) UL (ref 0–0.7)
EOSINOPHIL NFR BLD AUTO: 6 %
ERYTHROCYTE [DISTWIDTH] IN BLOOD BY AUTOMATED COUNT: 16.1 %
GLOBULIN PLAS-MCNC: 3.3 G/DL (ref 2–3.5)
GLUCOSE BLD-MCNC: 166 MG/DL (ref 70–99)
GLUCOSE UR STRIP.AUTO-MCNC: NORMAL MG/DL
HCT VFR BLD AUTO: 36.6 % (ref 35–48)
HGB BLD-MCNC: 11.9 G/DL (ref 12–16)
IMM GRANULOCYTES # BLD AUTO: 0.01 X10(3) UL (ref 0–1)
IMM GRANULOCYTES NFR BLD: 0.2 %
KETONES UR STRIP.AUTO-MCNC: NEGATIVE MG/DL
LEUKOCYTE ESTERASE UR QL STRIP.AUTO: 500
LIPASE SERPL-CCNC: 119 U/L (ref 12–53)
LYMPHOCYTES # BLD AUTO: 1.74 X10(3) UL (ref 1–4)
LYMPHOCYTES NFR BLD AUTO: 30.6 %
MCH RBC QN AUTO: 27.4 PG (ref 26–34)
MCHC RBC AUTO-ENTMCNC: 32.5 G/DL (ref 31–37)
MCV RBC AUTO: 84.3 FL (ref 80–100)
MONOCYTES # BLD AUTO: 0.46 X10(3) UL (ref 0.1–1)
MONOCYTES NFR BLD AUTO: 8.1 %
NEUTROPHILS # BLD AUTO: 3.1 X10 (3) UL (ref 1.5–7.7)
NEUTROPHILS # BLD AUTO: 3.1 X10(3) UL (ref 1.5–7.7)
NEUTROPHILS NFR BLD AUTO: 54.4 %
NITRITE UR QL STRIP.AUTO: NEGATIVE
OSMOLALITY SERPL CALC.SUM OF ELEC: 287 MOSM/KG (ref 275–295)
PH UR STRIP.AUTO: 6.5 [PH] (ref 5–8)
PLATELET # BLD AUTO: 271 10(3)UL (ref 150–450)
POTASSIUM SERPL-SCNC: 4.4 MMOL/L (ref 3.5–5.1)
PROT SERPL-MCNC: 7.7 G/DL (ref 5.7–8.2)
PROT UR STRIP.AUTO-MCNC: NEGATIVE MG/DL
RBC # BLD AUTO: 4.34 X10(6)UL (ref 3.8–5.3)
RBC #/AREA URNS AUTO: >10 /HPF
SODIUM SERPL-SCNC: 136 MMOL/L (ref 136–145)
SP GR UR STRIP.AUTO: 1.01 (ref 1–1.03)
UROBILINOGEN UR STRIP.AUTO-MCNC: NORMAL MG/DL
WBC # BLD AUTO: 5.7 X10(3) UL (ref 4–11)
WBC #/AREA URNS AUTO: >50 /HPF
WBC CLUMPS UR QL AUTO: PRESENT /HPF

## 2025-05-17 PROCEDURE — 87086 URINE CULTURE/COLONY COUNT: CPT | Performed by: EMERGENCY MEDICINE

## 2025-05-17 PROCEDURE — 85025 COMPLETE CBC W/AUTO DIFF WBC: CPT | Performed by: EMERGENCY MEDICINE

## 2025-05-17 PROCEDURE — 81001 URINALYSIS AUTO W/SCOPE: CPT | Performed by: EMERGENCY MEDICINE

## 2025-05-17 PROCEDURE — 99285 EMERGENCY DEPT VISIT HI MDM: CPT

## 2025-05-17 PROCEDURE — 74176 CT ABD & PELVIS W/O CONTRAST: CPT | Performed by: EMERGENCY MEDICINE

## 2025-05-17 PROCEDURE — 83690 ASSAY OF LIPASE: CPT | Performed by: EMERGENCY MEDICINE

## 2025-05-17 PROCEDURE — 96365 THER/PROPH/DIAG IV INF INIT: CPT

## 2025-05-17 PROCEDURE — 99284 EMERGENCY DEPT VISIT MOD MDM: CPT

## 2025-05-17 PROCEDURE — 80053 COMPREHEN METABOLIC PANEL: CPT | Performed by: EMERGENCY MEDICINE

## 2025-05-17 RX ORDER — CEPHALEXIN 500 MG/1
500 CAPSULE ORAL 3 TIMES DAILY
Qty: 15 CAPSULE | Refills: 0 | Status: SHIPPED | OUTPATIENT
Start: 2025-05-17 | End: 2025-05-17

## 2025-05-17 NOTE — ED INITIAL ASSESSMENT (HPI)
Pt to ER w/ complaints of left sided flank pain, urinary urgency & incontinence. Had left ureteral stent placed on 4/5.

## 2025-05-17 NOTE — ED PROVIDER NOTES
Patient Seen in: [facility] Emergency Department      History     Chief Complaint   Patient presents with    Abdomen/Flank Pain    Kidney Problem    Urinary Symptoms            Stated Complaint: Kidney Stones    Subjective:   79-year-old female, history of kidney stones, left ureteral stent placed in April, scheduled to have lithotripsy on Monday, here for evaluation of urinary frequency urgency and dysuria and some incontinence as well.  Called the urology office recommend she come here if she is uncomfortable.  She called in last night.      History of Present Illness                Objective:     Past Medical History:    Blood in the stool    CAD (coronary artery disease)    Dr. Montero     Calculus of kidney    Colitis    Constipation    Coronary atherosclerosis of native coronary artery    Diabetes (HCC)    Displacement of lumbar intervertebral disc without myelopathy    Dizziness    DVT (deep venous thrombosis) ([facility])    Frequent use of laxatives    Gall stones    Hemorrhoids    High cholesterol    Hip fracture ([facility])    History of blood transfusion    Irregular bowel habits    Kidney stones    Lumbar disc disease    Osteoporosis    Senile osteoporosis    Shortness of breath    Stented coronary artery    Stool incontinence    Stress fracture, left fibula, initial encounter for fracture    Weight loss              Past Surgical History:   Procedure Laterality Date    Angioplasty (coronary)      Colectomy      Egd      Fracture surgery Right     ankle    Lithotripsy      Lmbr epidural steroid inj, physiatry (internal)      Sigmoidoscopy,diagnostic                  Social History     Socioeconomic History    Marital status:    Tobacco Use    Smoking status: Never    Smokeless tobacco: Never   Vaping Use    Vaping status: Never Used   Substance and Sexual Activity    Alcohol use: No     Alcohol/week: 0.0 standard drinks of alcohol    Drug use: No    Sexual activity: Never   Other Topics Concern     Caffeine Concern Yes     Comment: 1 cup tea once per day     Exercise Yes     Comment: x7 days per week    Social History Narrative    The patient does not use an assistive device..      The patient does live in a home with stairs.     Social Drivers of Health     Food Insecurity: No Food Insecurity (4/6/2025)    NCSS - Food Insecurity     Worried About Running Out of Food in the Last Year: No     Ran Out of Food in the Last Year: No   Transportation Needs: No Transportation Needs (4/6/2025)    NCSS - Transportation     Lack of Transportation: No   Housing Stability: Not At Risk (4/6/2025)    NCSS - Housing/Utilities     Has Housing: Yes     Worried About Losing Housing: No     Unable to Get Utilities: No                                Physical Exam     ED Triage Vitals [05/17/25 0749]   BP (!) 162/85   Pulse 73   Resp 18   Temp 97 °F (36.1 °C)   Temp src Temporal   SpO2 99 %   O2 Device None (Room air)       Current Vitals:   Vital Signs  BP: (!) 169/63  Pulse: 67  Resp: 18  Temp: 97 °F (36.1 °C)  Temp src: Temporal  MAP (mmHg): 93    Oxygen Therapy  SpO2: 100 %  O2 Device: None (Room air)          Physical Exam  Vitals and nursing note reviewed.   Constitutional:       Appearance: She is not toxic-appearing.   HENT:      Head: Normocephalic.   Cardiovascular:      Rate and Rhythm: Normal rate.   Pulmonary:      Effort: Pulmonary effort is normal.      Breath sounds: Normal breath sounds.   Abdominal:      Palpations: Abdomen is soft.      Tenderness: There is no abdominal tenderness.   Skin:     General: Skin is warm and dry.   Neurological:      General: No focal deficit present.      Mental Status: She is alert.       Patient is under the impression that since urology office sent her here that she would get lithotripsy today.  I explained that this very unlikely.  She contemplating going home and getting her procedure done 48 hours from now versus staying for further evaluation.  Did offer to CT and blood work  and check her urine send for infection or renal failure worsening symptoms and will definitely touch base with urology but just setting expectation that it is very unlikely that she would get any sort of urgent lithotripsy here today but we could admit her for evaluation if need be and she is comfortable doing the test and evaluating afterwards when we speak with urology          ED Course     Labs Reviewed   COMP METABOLIC PANEL (14) - Abnormal; Notable for the following components:       Result Value    Glucose 166 (*)     Creatinine 1.20 (*)     eGFR-Cr 46 (*)     All other components within normal limits   CBC WITH DIFFERENTIAL WITH PLATELET - Abnormal; Notable for the following components:    HGB 11.9 (*)     All other components within normal limits   LIPASE - Abnormal; Notable for the following components:    Lipase 119 (*)     All other components within normal limits   URINALYSIS WITH CULTURE REFLEX - Abnormal; Notable for the following components:    Clarity Urine Turbid (*)     Blood Urine 1+ (*)     Leukocyte Esterase Urine 500 (*)     WBC Urine >50 (*)     RBC Urine >10 (*)     WBC Clump Present (*)     All other components within normal limits   RAINBOW DRAW BLUE   URINE CULTURE, ROUTINE          Results                                MDM      CT ABDOMEN+PELVIS KIDNEYSTONE 2D RNDR(NO IV,NO ORAL)(CPT=74176)  Result Date: 5/17/2025  CONCLUSION:  There is a left internal ureteral stent with decompression of the left renal collecting system.  This is been placed since 4/5/2025 CT scan.  There are 2 calculi noted in the left ureter alongside the left ureteral stent measuring 5 mm proximally and 2 to 4 mm mid to distally.  Additional left nephrolithiasis arising from the inferior pole calices of the left kidney.  No change in right nephrolithiasis.  Dominant right renal cyst unchanged.  Mild wall thickening of the urinary bladder.  This may be accentuated by incomplete distension.    LOCATION:  [facility]    Dictated by (CST): Avis Valero MD on 5/17/2025 at 9:15 AM     Finalized by (CST): Avis Valero MD on 5/17/2025 at 9:21 AM         I independent interpreted the CT abdomen pelvis and note the ureteral stent     differential diagnosis includes, but not limited to, UTI,Nephrolithiasis, hydronephrosis    External chart review demonstrates her outpatient duly encounters recently       at bedside helpful to provide information on the history presenting illness    79-year-old female presents with some urinary frequency urgency but no incontinence as well.  She is neurovascular intact.  Stent as above.  Able to void spontaneously here.  Has no neurodeficits.  Spoke with on-call urology Dr. Torres, patient has a lithotripsy scheduled for 48 hours from now.  She keep that appointment.  Recommends Rocephin and Keflex for home.  Did discuss antibiotics, was on Keflex a month ago, he is okay with repeating the Keflex for now, they will plan on the lithotripsy on Monday as scheduled.  Patient is asked to be discharged home.  She is allowed to be here anymore.  States she is comfortable this plan and wants to go home.  Resting in no distress.  Further workup offered considered discussed and declined.  She has no interest in admission.  Shared decision making utilized and discharged home with strict return precautions    Patient was screened and evaluated during this visit.  As the treating physician attending to the patient, I determined within reasonable clinical confidence and prior to discharge, that an emergency medical condition was not or was no longer present.  There was no indication for further evaluation, treatment, or admission on an emergency basis.  Comprehensive verbal and written discharge and follow-up instructions were provided to help prevent relapse or worsening.  Patient was instructed to follow-up with their primary care provider for further evaluation and treatment, return immediately to ER for  worsening, concerning, new, or changing/persisting symptoms. I discussed the case with the patient and they had no questions, complaints, or concerns.  Patient was comfortable going home.     Per the discharge paperwork, patients are encouraged to and given instructions on how to sign up for MyChart, where they have access to their records, including any/all incidental findings.     This note was prepared using Dragon Medical voice recognition dictation software. As a result errors may occur. When identified these errors have been corrected. While every attempt is made to correct errors during dictation discrepancies may still exist    Note to patient: The 21st Century Cures Act makes medical notes like these available to patients in the interest of transparency. However, this is a medical document intended as peer to peer communication. It is written in medical language and may contain abbreviations or verbiage that are unfamiliar. It may appear blunt or direct. Medical documents are intended to carry relevant information, facts as evident, and the clinical opinion of the practitioner.               Medical Decision Making      Disposition and Plan     Clinical Impression:  1. Acute cystitis with hematuria    2. Ureteral stent present    3. Elevated blood pressure reading         Disposition:  Discharge  5/17/2025 10:53 am    Follow-up:  Nirmala Sharma MD  [address]  [address]  [facility] [address]  [phone]    Follow up            Medications Prescribed:  Current Discharge Medication List                Supplementary Documentation:

## 2025-05-19 ENCOUNTER — LAB REQUISITION (OUTPATIENT)
Dept: LAB | Facility: HOSPITAL | Age: 80
End: 2025-05-19
Payer: MEDICARE

## 2025-05-19 DIAGNOSIS — N20.0 CALCULUS OF KIDNEY: ICD-10-CM

## 2025-05-19 PROCEDURE — 88300 SURGICAL PATH GROSS: CPT | Performed by: UROLOGY

## 2025-05-19 PROCEDURE — 82365 CALCULUS SPECTROSCOPY: CPT | Performed by: UROLOGY

## 2025-05-29 LAB
CAOX DIHYDRATE: 10 %
CAOX MONOHYDRATE: 90 %
WEIGHT-STONE: 86 MG

## 2025-06-17 ENCOUNTER — TELEPHONE (OUTPATIENT)
Dept: INTERNAL MEDICINE CLINIC | Facility: CLINIC | Age: 80
End: 2025-06-17

## 2025-06-17 NOTE — TELEPHONE ENCOUNTER
Spoke w/patient regarding her surgery w/urologist. She requested a pre-op appt but there are no available openings before 06/23. This RN advised her to call one of our other clinics part of [facility]. She declined. Patient Stated she will not be calling any other office.   ([facility] supervisor is aware of situation)

## 2025-06-17 NOTE — TELEPHONE ENCOUNTER
Spoke w/patient who is requesting to have pre-op paper work faxed over from Dr. Jesus alex urologist.   Patient has surgery with urologist on 06/23.   This RN made pre-op appt for 06/18/25 waiting on pre-op paper work to be faxed over.

## 2025-06-17 NOTE — TELEPHONE ENCOUNTER
Patient having surgery on June 21st with Dr. Jesus Marcelo. Patient says she's unsure of what's required from PCP. I advised patient she should reach out to surgeons office to request for requirements to be sent to PCP office.     Patient stated she cannot and would like for the nurse to call the surgeons office to obtain information.

## 2025-06-17 NOTE — TELEPHONE ENCOUNTER
I called  Jesus Marcelo MD office at [phone] and spoke with Rylie. She stated the patient is scheduled for 2 procedures next week 6/23 and 6/25 and that she does not need a preoperative exam.  They will call her with instructions.     Patient informed.

## 2025-06-18 ENCOUNTER — APPOINTMENT (OUTPATIENT)
Age: 80
End: 2025-06-18
Attending: INTERNAL MEDICINE
Payer: MEDICARE

## 2025-06-18 NOTE — TELEPHONE ENCOUNTER
I called again and they connected me to Korin, the nurse with Dr. Marcelo's office.  She stated that they will squeeze her in for the H&P and that they have placed the order for the EKG and H&P. She also stated she will reach out to the patient.

## 2025-06-18 NOTE — TELEPHONE ENCOUNTER
Incoming call from [facility]   Patient needs EKG and H&P   Surgery is next week   Can an MD squeeze her in ASAP

## 2025-06-18 NOTE — TELEPHONE ENCOUNTER
Spoke to Shellie from [facility] who states she is not sure who \"Rylie is\" and why that information was relayed to us. Shellie states if our office is able to \"squeeze her in\". This RN relayed that our office has tried to direct the patient to a different clinic for a pre-op appointment, due to our office not having any available appointments. Shellie verbalized understanding and states she will reach out to the patient.

## 2025-06-19 ENCOUNTER — OFFICE VISIT (OUTPATIENT)
Age: 80
End: 2025-06-19
Attending: INTERNAL MEDICINE
Payer: MEDICARE

## 2025-06-19 ENCOUNTER — OFFICE VISIT (OUTPATIENT)
Dept: FAMILY MEDICINE CLINIC | Facility: CLINIC | Age: 80
End: 2025-06-19
Payer: MEDICARE

## 2025-06-19 VITALS
WEIGHT: 113.19 LBS | RESPIRATION RATE: 18 BRPM | TEMPERATURE: 97 F | BODY MASS INDEX: 20.57 KG/M2 | HEIGHT: 62.01 IN | SYSTOLIC BLOOD PRESSURE: 151 MMHG | DIASTOLIC BLOOD PRESSURE: 82 MMHG | HEART RATE: 84 BPM

## 2025-06-19 DIAGNOSIS — Z02.9 ADMINISTRATIVE ENCOUNTER: Primary | ICD-10-CM

## 2025-06-19 DIAGNOSIS — D50.0 IRON DEFICIENCY ANEMIA DUE TO CHRONIC BLOOD LOSS: ICD-10-CM

## 2025-06-19 DIAGNOSIS — K51.019 CHRONIC ULCERATIVE ENTEROCOLITIS WITH COMPLICATION (HCC): ICD-10-CM

## 2025-06-19 DIAGNOSIS — K91.850 POUCHITIS (HCC): Primary | ICD-10-CM

## 2025-06-19 LAB
BASOPHILS # BLD AUTO: 0.02 X10(3) UL (ref 0–0.2)
BASOPHILS NFR BLD AUTO: 0.4 %
DEPRECATED HBV CORE AB SER IA-ACNC: 244 NG/ML (ref 50–306)
EOSINOPHIL # BLD AUTO: 0.23 X10(3) UL (ref 0–0.7)
EOSINOPHIL NFR BLD AUTO: 4.9 %
ERYTHROCYTE [DISTWIDTH] IN BLOOD BY AUTOMATED COUNT: 15.4 %
HCT VFR BLD AUTO: 34.4 % (ref 35–48)
HGB BLD-MCNC: 11.3 G/DL (ref 12–16)
IMM GRANULOCYTES # BLD AUTO: 0.01 X10(3) UL (ref 0–1)
IMM GRANULOCYTES NFR BLD: 0.2 %
IRON SATN MFR SERPL: 25 % (ref 15–50)
IRON SERPL-MCNC: 73 UG/DL (ref 50–170)
LYMPHOCYTES # BLD AUTO: 1.32 X10(3) UL (ref 1–4)
LYMPHOCYTES NFR BLD AUTO: 28.3 %
MCH RBC QN AUTO: 27.8 PG (ref 26–34)
MCHC RBC AUTO-ENTMCNC: 32.8 G/DL (ref 31–37)
MCV RBC AUTO: 84.7 FL (ref 80–100)
MONOCYTES # BLD AUTO: 0.28 X10(3) UL (ref 0.1–1)
MONOCYTES NFR BLD AUTO: 6 %
NEUTROPHILS # BLD AUTO: 2.8 X10 (3) UL (ref 1.5–7.7)
NEUTROPHILS # BLD AUTO: 2.8 X10(3) UL (ref 1.5–7.7)
NEUTROPHILS NFR BLD AUTO: 60.2 %
PLATELET # BLD AUTO: 241 10(3)UL (ref 150–450)
RBC # BLD AUTO: 4.06 X10(6)UL (ref 3.8–5.3)
TOTAL IRON BINDING CAPACITY: 289 UG/DL (ref 250–425)
TRANSFERRIN SERPL-MCNC: 212 MG/DL (ref 250–380)
WBC # BLD AUTO: 4.7 X10(3) UL (ref 4–11)

## 2025-06-19 RX ORDER — CYANOCOBALAMIN 1000 UG/ML
1000 INJECTION, SOLUTION INTRAMUSCULAR; SUBCUTANEOUS ONCE
Status: COMPLETED | OUTPATIENT
Start: 2025-06-19 | End: 2025-06-19

## 2025-06-19 RX ORDER — CYANOCOBALAMIN 1000 UG/ML
1000 INJECTION, SOLUTION INTRAMUSCULAR; SUBCUTANEOUS ONCE
OUTPATIENT
Start: 2025-08-14

## 2025-06-19 RX ADMIN — CYANOCOBALAMIN 1000 MCG: 1000 INJECTION, SOLUTION INTRAMUSCULAR; SUBCUTANEOUS at 10:03:00

## 2025-06-19 NOTE — TELEPHONE ENCOUNTER
Patient called asking why we sent her to the [facility] for her H&P   Informed patient we did not send her to the [facility] and per note yesterday, the supervisor spoke with Dr Leana ribeiro who was going to inform her that they could do her H&P at their office. Patient began arguing back and forth and I told patient I am not going to argue with her and I can transfer her to her surgeons office who is taking care of her surgical clearance now.   Patient continued to try and argue about how we sent her to the [facility] and she doesn't know what she's doing there. Informed patient once again I would transfer her to Dr Leana ribeiro as we are not taking care of this for her due to the lack of open appointments     FYI

## 2025-06-19 NOTE — PROGRESS NOTES
Education Record    Learner:  Patient  Chronic ulcerative enterocolitis with complication     Disease / Diagnosis:    Barriers / Limitations:  None   Comments:    Method:  Brief focused   Comments:    General Topics:  Diet, Infection, Medication, Pain, Precautions, Procedure, Side effects and symptom management, Plan of care reviewed, and Fall risk and prevention   Comments:    Outcome:  Shows understanding   Comments:    PL+Enyvio+b 12 received today without any issues. Patient is scheduled in 8 weeks for next infusion + injection .

## 2025-06-19 NOTE — PROGRESS NOTES
Patient presents for a pre-procedure clearance. Reports she was advised by PCP office to come here for clearance. Reviewed [facility] limitations and advised to make appointment with PCP office or Dr. Marcelo for clearance. Patient has call out to Dr. Marcelo's RN.     Visit canceled. No charge.

## 2025-07-19 NOTE — TELEPHONE ENCOUNTER
Patient needs to be evaluated -cannot order an x-ray without doing a clinical examination.  Need to go to the immediate care clinic OR she can see me on Monday at 915   Pt contacted via phone. She states she is returning call in regards to lab results. She states she does not want to follow Dr. Lennon's recommendations as she is going to be establishing with a new PCP within [place] 8/22/25. Pt reiterated lab results per Dr. Lennon from note:   \"tsh is abnormal need to change levothyroxine from 100 to 125 mcg daily . Please recheck TSH in 2 weeks. \"     Pt has concerns that since this new provider is closer, she does not wish to do any follow up labs with Dr. Lennon. Writer advised pt should still complete lab work and go forward with new levothyroxine rx. Advised she is able to complete labs at clinic closer to home.     Pt verbalizes understanding at this time.   Reason for Disposition  • Caller has medicine question only, adult not sick, AND triager answers question    Protocols used: Medication Question Call-A-

## 2025-07-23 ENCOUNTER — APPOINTMENT (OUTPATIENT)
Facility: LOCATION | Age: 80
End: 2025-07-23
Attending: INTERNAL MEDICINE

## 2025-08-14 ENCOUNTER — APPOINTMENT (OUTPATIENT)
Facility: LOCATION | Age: 80
End: 2025-08-14
Attending: INTERNAL MEDICINE

## 2025-08-14 ENCOUNTER — OFFICE VISIT (OUTPATIENT)
Facility: LOCATION | Age: 80
End: 2025-08-14
Attending: INTERNAL MEDICINE

## 2025-08-14 VITALS
BODY MASS INDEX: 20.42 KG/M2 | WEIGHT: 112.38 LBS | HEIGHT: 62.01 IN | OXYGEN SATURATION: 97 % | TEMPERATURE: 98 F | DIASTOLIC BLOOD PRESSURE: 63 MMHG | RESPIRATION RATE: 20 BRPM | SYSTOLIC BLOOD PRESSURE: 156 MMHG | HEART RATE: 82 BPM

## 2025-08-14 DIAGNOSIS — D50.0 IRON DEFICIENCY ANEMIA DUE TO CHRONIC BLOOD LOSS: ICD-10-CM

## 2025-08-14 DIAGNOSIS — K91.850 POUCHITIS (HCC): Primary | ICD-10-CM

## 2025-08-14 DIAGNOSIS — K51.019 CHRONIC ULCERATIVE ENTEROCOLITIS WITH COMPLICATION (HCC): ICD-10-CM

## 2025-08-14 LAB
BASOPHILS # BLD AUTO: 0.06 X10(3) UL (ref 0–0.2)
BASOPHILS NFR BLD AUTO: 0.9 %
DEPRECATED HBV CORE AB SER IA-ACNC: 230 NG/ML (ref 50–306)
EOSINOPHIL # BLD AUTO: 0.55 X10(3) UL (ref 0–0.7)
EOSINOPHIL NFR BLD AUTO: 8.3 %
ERYTHROCYTE [DISTWIDTH] IN BLOOD BY AUTOMATED COUNT: 14.4 %
HCT VFR BLD AUTO: 39.8 % (ref 35–48)
HGB BLD-MCNC: 12.5 G/DL (ref 12–16)
IMM GRANULOCYTES # BLD AUTO: 0.01 X10(3) UL (ref 0–1)
IMM GRANULOCYTES NFR BLD: 0.2 %
IRON SATN MFR SERPL: 21 % (ref 15–50)
IRON SERPL-MCNC: 69 UG/DL (ref 50–170)
LYMPHOCYTES # BLD AUTO: 2.4 X10(3) UL (ref 1–4)
LYMPHOCYTES NFR BLD AUTO: 36.4 %
MCH RBC QN AUTO: 27.5 PG (ref 26–34)
MCHC RBC AUTO-ENTMCNC: 31.4 G/DL (ref 31–37)
MCV RBC AUTO: 87.7 FL (ref 80–100)
MONOCYTES # BLD AUTO: 0.5 X10(3) UL (ref 0.1–1)
MONOCYTES NFR BLD AUTO: 7.6 %
NEUTROPHILS # BLD AUTO: 3.07 X10 (3) UL (ref 1.5–7.7)
NEUTROPHILS # BLD AUTO: 3.07 X10(3) UL (ref 1.5–7.7)
NEUTROPHILS NFR BLD AUTO: 46.6 %
PLATELET # BLD AUTO: 247 10(3)UL (ref 150–450)
RBC # BLD AUTO: 4.54 X10(6)UL (ref 3.8–5.3)
TOTAL IRON BINDING CAPACITY: 325 UG/DL (ref 250–425)
TRANSFERRIN SERPL-MCNC: 253 MG/DL (ref 250–380)
WBC # BLD AUTO: 6.6 X10(3) UL (ref 4–11)

## 2025-08-14 RX ORDER — CYANOCOBALAMIN 1000 UG/ML
1000 INJECTION, SOLUTION INTRAMUSCULAR; SUBCUTANEOUS ONCE
Status: COMPLETED | OUTPATIENT
Start: 2025-08-14 | End: 2025-08-14

## 2025-08-14 RX ORDER — CYANOCOBALAMIN 1000 UG/ML
1000 INJECTION, SOLUTION INTRAMUSCULAR; SUBCUTANEOUS ONCE
OUTPATIENT
Start: 2025-10-09

## 2025-08-14 RX ADMIN — CYANOCOBALAMIN 1000 MCG: 1000 INJECTION, SOLUTION INTRAMUSCULAR; SUBCUTANEOUS at 15:26:00

## (undated) DIAGNOSIS — M54.16 LEFT LUMBAR RADICULITIS: Primary | ICD-10-CM

## (undated) DIAGNOSIS — M54.50 ACUTE LEFT-SIDED LOW BACK PAIN, UNSPECIFIED WHETHER SCIATICA PRESENT: ICD-10-CM

## (undated) DIAGNOSIS — G89.29 CHRONIC LEFT-SIDED LOW BACK PAIN WITH LEFT-SIDED SCIATICA: Primary | ICD-10-CM

## (undated) DIAGNOSIS — M54.42 CHRONIC LEFT-SIDED LOW BACK PAIN WITH LEFT-SIDED SCIATICA: Primary | ICD-10-CM

## (undated) DEVICE — 3M™ RED DOT™ MONITORING ELECTRODE WITH FOAM TAPE AND STICKY GEL, 50/BAG, 20/CASE, 72/PLT 2570: Brand: RED DOT™

## (undated) DEVICE — 1200CC GUARDIAN II: Brand: GUARDIAN

## (undated) DEVICE — 20 ML SYRINGE LUER-LOCK TIP: Brand: MONOJECT

## (undated) DEVICE — SYRINGE MED 20ML STD CLR PLAS LL TIP N CTRL

## (undated) DEVICE — PACK PBDS CYSTOSCOPY

## (undated) DEVICE — GLOVE SUR 7.5 SENSICARE PI PIP CRM PWD F

## (undated) DEVICE — Device: Brand: DEFENDO AIR/WATER/SUCTION AND BIOPSY VALVE

## (undated) DEVICE — GLOVE SUR 8 SENSICARE NEOPR PWD F

## (undated) DEVICE — ENDOSCOPY PACK UPPER: Brand: MEDLINE INDUSTRIES, INC.

## (undated) DEVICE — BITEBLOCK ENDOSCP 60FR MAXI STRP

## (undated) DEVICE — ENDOSCOPY PACK - LOWER: Brand: MEDLINE INDUSTRIES, INC.

## (undated) DEVICE — NITINOL WIRE WITH HYDROPHILIC TIP: Brand: SENSOR

## (undated) DEVICE — KIT VLV 5 PC AIR H2O SUCT BX ENDOGATOR CONN

## (undated) DEVICE — FORCEP BIOPSY RJ4 LG CAP W/ND

## (undated) DEVICE — SLEEVE COMPR MD KNEE LEN SGL USE KENDALL SCD

## (undated) DEVICE — KIT CUSTOM ENDOPROCEDURE STERIS

## (undated) DEVICE — SYRINGE MED 10ML LL TIP W/O SFTY DISP

## (undated) DEVICE — 3M™ RED DOT™ MONITORING ELECTRODE WITH FOAM TAPE AND STICKY GEL 2570-3, 3/BAG, 200/CASE, 54/PLT: Brand: RED DOT™

## (undated) DEVICE — SOLUTION IRRIG 3000ML 0.9% NACL FLX CONT

## (undated) DEVICE — ADHESIVE LIQ 2/3ML VI MASTISOL

## (undated) DEVICE — FILTERLINE NASAL ADULT O2/CO2

## (undated) DEVICE — 10FT COMBINED O2 DELIVERY/CO2 MONITORING. FILTER WITH MICROSTREAM TYPE LUER: Brand: DUAL ADULT NASAL CANNULA

## (undated) DEVICE — GIJAW SINGLE-USE BIOPSY FORCEPS WITH NEEDLE: Brand: GIJAW

## (undated) NOTE — LETTER
[address]  [address]  Authorization for Surgical Operation and Procedure    Date:___________                                                                                                         Time:__________  1.  I hereby authorizeSurgeon(s):  Galileo Baeza MD, my physician and his/her assistants (if applicable), which may include medical students, residents, and/or fellows, to perform the following surgical operation/ procedure and administer such anesthesia as may be determined necessary by my physician:  Operation/Procedure name (s) Procedure(s):  CYSTOSCOPY, LEFT RETROGRADE PYELOGRAM, LEFT URETERAL STENT INSERTION on [facility]   2.   I recognize that during the surgical operation/procedure, unforeseen conditions may necessitate additional or different procedures than those listed above.  I, therefore, further authorize and request that the above-named surgeon, assistants, or designees perform such procedures as are, in their judgment, necessary and desirable.    3.   My surgeon/physician has discussed prior to my surgery the potential benefits, risks and side effects of this procedure; the likelihood of achieving goals; and potential problems that might occur during recuperation.  They also discussed reasonable alternatives to the procedure, including risks, benefits, and side effects related to the alternatives and risks related to not receiving this procedure.  I have had all my questions answered and I acknowledge that no guarantee has been made as to the result that may be obtained.    4.   Should the need arise during my operation/procedure, which includes change of level of care prior to discharge, I also consent to the administration of blood and/or blood products.  Further, I understand that despite careful testing and screening of blood or blood products by collecting agencies, I may still be subject to ill effects as a result of receiving a  blood transfusion and/or blood products.  The following are some, but not all, of the potential risks that can occur: fever and allergic reactions, hemolytic reactions, transmission of diseases such as Hepatitis, AIDS and Cytomegalovirus (CMV) and fluid overload.  In the event that I wish to have an autologous transfusion of my own blood, or a directed donor transfusion, I will discuss this with my physician.  Check only if Refusing Blood or Blood Products  I understand refusal of blood or blood products as deemed necessary by my physician may have serious consequences to my condition to include possible death. I hereby assume responsibility for my refusal and release the hospital, its personnel, and my physicians from any responsibility for the consequences of my refusal.         o  Refuse    5.   I authorize the use of any specimen, organs, tissues, body parts or foreign objects that may be removed from my body during the operation/procedure for diagnosis, research or teaching purposes and their subsequent disposal by hospital authorities.  I also authorize the release of specimen test results and/or written reports to my treating physician on the hospital medical staff or other referring or consulting physicians involved in my care, at the discretion of the Pathologist or my treating physician.    6.   I consent to the photographing or videotaping of the operations or procedures to be performed, including appropriate portions of my body for medical, scientific, or educational purposes, provided my identity is not revealed by the pictures or by descriptive texts accompanying them.  If the procedure has been photographed/videotaped, the surgeon will obtain the original picture, image, videotape or CD.  The hospital will not be responsible for storage, release or maintenance of the picture, image, tape or CD.    7.   I consent to the presence of a  or observers in the operating room as deemed  necessary by my physician or their designees.    8.   I recognize that in the event my procedure results in extended X-Ray/fluoroscopy time, I may develop a skin reaction.    9. If I have a Do Not Attempt Resuscitation (DNAR) order in place, that status will be suspended while in the operating room, procedural suite, and during the recovery period unless otherwise explicitly stated by me (or a person authorized to consent on my behalf). The surgeon or my attending physician will determine when the applicable recovery period ends for purposes of reinstating the DNAR order.  10. Patients having a sterilization procedure: I understand that if the procedure is successful the results will be permanent and it will therefore be impossible for me to inseminate, conceive, or bear children.  I also understand that the procedure is intended to result in sterility, although the result has not been guaranteed.   11. I acknowledge that my physician has explained sedation/analgesia administration to me including the risk and benefits I consent to the administration of sedation/analgesia as may be necessary or desirable in the judgment of my physician.    I CERTIFY THAT I HAVE READ AND FULLY UNDERSTAND THE ABOVE CONSENT TO OPERATION and/or OTHER PROCEDURE.     _________________________________________  __________________________________  Signature of Patient     Signature of Responsible Person         ___________________________________         Printed Name of Responsible Person             _________________________________                 Relationship to Patient  _________________________________________  ______________________________  Signature of Witness          Date  Time      Patient Name: Kj Condon     : 10/15/1945                 Printed: 2025     Medical Record #: [id number]                                            Page 2 of 3      [address]  [address]    Consent for  Anesthesia    IKj agree to be cared for by an anesthesiologist, who is specially trained to monitor me and give me medicine to put me to sleep or keep me comfortable during my procedure    I understand that my anesthesiologist is not an employee or agent of [facility] [employer]. He or she works for [employer].    As the patient asking for anesthesia services, I agree to:  Allow the anesthesiologist (anesthesia doctor) to give me medicine and do additional procedures as necessary. Some examples are: Starting or using an “IV” to give me medicine, fluids or blood during my procedure, and having a breathing tube placed to help me breathe when I’m asleep (intubation). In the event that my heart stops working properly, I understand that my anesthesiologist will make every effort to sustain my life, unless otherwise directed by [facility] Do Not Resuscitate documents.  Tell my anesthesia doctor before my procedure:  If I am pregnant.  The last time that I ate or drank.  All of the medicines I take (including prescriptions, herbal supplements, and pills I can buy without a prescription (including street drugs/illegal medications). Failure to inform my anesthesiologist about these medicines may increase my risk of anesthetic complications.  If I am allergic to anything or have had a reaction to anesthesia before.  I understand how the anesthesia medicine will help me (benefits).  I understand that with any type of anesthesia medicine there are risks:  The most common risks are: nausea, vomiting, sore throat, muscle soreness, damage to my eyes, mouth, or teeth (from breathing tube placement).  Rare risks include: remembering what happened during my procedure, allergic reactions to medications, injury to my airway, heart, lungs, vision, nerves, or muscles and in extremely rare instances death.  My doctor has explained to me other choices available to me for my care  (alternatives).  Pregnant Patients (“epidural”):  I understand that the risks of having an epidural (medicine given into my back to help control pain during labor), include itching, low blood pressure, difficulty urinating, headache or slowing of the baby’s heart. Very rare risks include infection, bleeding, seizure, irregular heart rhythms and nerve injury.  Regional Anesthesia (“spinal”, “epidural”, & “nerve blocks”):  I understand that rare but potential complications include headache, bleeding, infection, seizure, irregular heart rhythms, and nerve injury.    I can change my mind about having anesthesia services at any time before I get the medicine.    _____________________________________________________________________________  Patient (or Representative) Signature/Relationship to Patient  Date   Time    _____________________________________________________________________________   Name (if used)    Language/Organization   Time    _____________________________________________________________________________  Anesthesiologist Signature     Date   Time  I have discussed the procedure and information above with the patient (or patient’s representative) and answered their questions. The patient or their representative has agreed to have anesthesia services.    _____________________________________________________________________________  Witness        Date   Time  I have verified that the signature is that of the patient or patient’s representative, and that it was signed before the procedure  Patient Name: Kj Condon     : 10/15/1945                 Printed: 2025     Medical Record #: [id number]                     Page 3 of 3

## (undated) NOTE — MR AVS SNAPSHOT
[place]  [address] [facility] [address]  [address] [facility] [address] [phone]               Thank you for choosing us for your health care visit with Valente Braxton MD.  We are glad to serve you and happy to provide you with this armstrong * Notice: This list has 2 medication(s) that are the same as other medications prescribed for you. Read the directions carefully, and ask your doctor or other care provider to review them with you. Today's Orders     H.  PYLORI BREATH TEST [id number]

## (undated) NOTE — LETTER
[address]  [address] [facility] [address], [address] [facility]  Authorization for Surgical Operation and Procedure     Date:___________                                                                                                         Time:__________ 4.   Should the need arise during my operation or immediate post-operative period, I also consent to the administration of blood and/or blood products.   Further, I understand that despite careful testing and screening of blood or blood products by [facility] 8.   I recognize that in the event my procedure results in extended X-Ray/fluoroscopy time, I may develop a skin reaction. 9.  If I have a Do Not Attempt Resuscitation (DNAR) order in place, that status will be suspended while in the operating room, proc 1. I, Sam Resides agree to be cared for by an anesthesiologist, who is specially trained to monitor me and give me medicine to put me to sleep or keep me comfortable during my procedure    I understand that my anesthesiologist is not an employee or agent 5. My doctor has explained to me other choices available to me for my care (alternatives).   6. Pregnant Patients (“epidural”):  I understand that the risks of having an epidural (medicine given into my back to help control pain during labor), include itchi

## (undated) NOTE — MR AVS SNAPSHOT
[place]  [address] [facility] [address]  [facility] [address]  [phone]               Thank you for choosing us for your health care visit with Sonal Zafar MD.  We are glad to serve you and happy to provide you with this armstrong If you are confident that your benefit plan will not require a referral or authorization, such as [place], please feel free to schedule your appointment immediately.  However, if you are unsure about the requirements for authorization, please wait medications prescribed for you. Read the directions carefully, and ask your doctor or other care provider to review them with you. [employer]     Sign up for [employer], your secure online medical record.   [employer] will allow you to access patient inst

## (undated) NOTE — LETTER
01/28/19        [address] [phone]      Dear Verito Santa,    Our records indicate that you have outstanding lab work and or testing that was ordered for you and has not yet been completed: Xray - Please call [phone]

## (undated) NOTE — MR AVS SNAPSHOT
[place]  [address] [facility] [address]  [address] [facility] [id number] [phone]               Thank you for choosing us for your health care visit with RYAN Boston.   We are glad to serve you and happy to provide you with this armstrong med list.                aspirin 81 MG Chew   Chew 1 tablet by mouth daily. atenolol 50 MG Tabs   Take 50 mg by mouth daily. What changed:  Another medication with the same name was removed.  Continue taking this medication, and follow the direc Support Staff. Remember, MyChart is NOT to be used for urgent needs. For medical emergencies, dial 911.            Visit [facility] online at  [email or web address]

## (undated) NOTE — LETTER
[facility], [address], [address] [facility] [address] [address]  [facility]: [phone]  FAX: [phone]      ANTICOAGULATION CLEARANCE REQUEST    Date: 2/11/2022    Attention:  ***          Phone:  ***  Fax:  ***    Our mutual patient, Brooke Tillman 10/15/1945 is scheduled for ***. Date of procedure: ***    Our records show this patient is on {[facility] ONCBCN ANTICOAGULANTS:7722}. Hold date:  ***    Resume date:  ***    Comments:     Signature: Kendall Peterson, DO     Date:  2/11/2022    Should you have any questions, please feel free to contact my office at ***.

## (undated) NOTE — LETTER
01/11/21        [address] [phone]      Dear Laura Carter,    Our records indicate that you have outstanding lab work and or testing that was ordered for you and has not yet been completed:  Mammogram - - Please contact Ce

## (undated) NOTE — LETTER
Date: 2023    Patient Name: Aiyana Rosas    :  10/15/1945          To Whom it may concern: This letter has been written at the patient's request. The above patient is required by her employer to have an annual TB lab test.     Please resubmit any lab charges for payment to the patient's insurance. The last TB Quantiferon blood test was ordered on 22.          Sincerely,      Selina Cornejo MD

## (undated) NOTE — LETTER
[address] [facility]   Date:   2/11/2022     Name:   Bere Schroeder    YOB: 1945   MRN:   [id number]       WHERE IS YOUR PAIN NOW? Dimas the areas on your body where you feel the described sensations. Use the appropriate symbol. Micki Davens the areas of radiation. Include all affected areas. Just to complete the picture, please draw in the face. ACHE:  ^ ^ ^   NUMBNESS:  0000   PINS & NEEDLES:  = = = =                              ^ ^ ^                       0000              = = = =                                    ^ ^ ^                       0000            = = = =      BURNING:  XXXX   STABBING: ////                  XXXX                ////                         XXXX          ////     Please dimas the line below indicating your degree of pain right now  with 0 being no pain 10 being the worst pain possible.                                          0             1             2              3             4              5              6              7             8             9             10         Patient Signature:

## (undated) NOTE — LETTER
[address] [place] [address] Way   Date:   12/29/2021     Name:   Santo Clinton    YOB: 1945   MRN:   [id number]       [facility]?   Dimas the areas on your body where you feel the described se

## (undated) NOTE — LETTER
07/31/18        [address] [phone]      Dear Alejandra Antony,    Our records indicate that you have outstanding lab work and or testing that was ordered for you and has not yet been completed:          Lipid Panel  To provide

## (undated) NOTE — LETTER
02/17/21      This letter has been written at the patient's request. The above patient was seen at the [facility] for treatment of a medical condition.     This patient is unable to walk for work at this time (including walking in to the hosp

## (undated) NOTE — LETTER
[address] [facility] [address], [address] [facility]  Authorization for Surgical Operation and Procedure   Date:___________                                                                                            Time:__________  1.  I hereby aut risks that can occur: fever and allergic reactions, hemolytic reactions, transmission of diseases such as Hepatitis, AIDS and Cytomegalovirus (CMV) and fluid overload.   In the event that I wish to have an autologous transfusion of my own blood, or a direct determine when the applicable recovery period ends for purposes of reinstating the DNAR order.   10. Patients having a sterilization procedure: I understand that if the procedure is successful the results will be permanent and it will therefore be impossibl

## (undated) NOTE — LETTER
11/03/21        [address] [phone]      Dear Chloe Raymond,    Our records indicate that you have outstanding lab work and or testing that was ordered for you and has not yet been completed:  Orders Placed This Encounter

## (undated) NOTE — LETTER
06/06/19        [address] [phone]      Dear Leandro Baldwin,    Our records indicate that you have outstanding lab work and or testing that was ordered for you and has not yet been completed: Non fasting lab work   To comple